# Patient Record
Sex: FEMALE | Race: BLACK OR AFRICAN AMERICAN | NOT HISPANIC OR LATINO | Employment: UNEMPLOYED | URBAN - METROPOLITAN AREA
[De-identification: names, ages, dates, MRNs, and addresses within clinical notes are randomized per-mention and may not be internally consistent; named-entity substitution may affect disease eponyms.]

---

## 2019-10-09 ENCOUNTER — HOSPITAL ENCOUNTER (EMERGENCY)
Facility: HOSPITAL | Age: 39
Discharge: HOME | End: 2019-10-09
Attending: EMERGENCY MEDICINE | Admitting: EMERGENCY MEDICINE
Payer: MEDICAID

## 2019-10-09 ENCOUNTER — APPOINTMENT (EMERGENCY)
Dept: RADIOLOGY | Facility: HOSPITAL | Age: 39
End: 2019-10-09
Payer: MEDICAID

## 2019-10-09 VITALS
RESPIRATION RATE: 16 BRPM | SYSTOLIC BLOOD PRESSURE: 136 MMHG | TEMPERATURE: 98.6 F | HEART RATE: 94 BPM | DIASTOLIC BLOOD PRESSURE: 76 MMHG | WEIGHT: 164.68 LBS | OXYGEN SATURATION: 96 %

## 2019-10-09 DIAGNOSIS — R07.89 CHEST WALL PAIN: Primary | ICD-10-CM

## 2019-10-09 LAB
ATRIAL RATE: 100 BPM
P AXIS: 77 DEGREES
PR INTERVAL: 126 MS
QRS AXIS: 58 DEGREES
QRSD INTERVAL: 72 MS
QT INTERVAL: 338 MS
QTC INTERVAL: 436 MS
T WAVE AXIS: 55 DEGREES
VENTRICULAR RATE: 100 BPM

## 2019-10-09 PROCEDURE — 93010 ELECTROCARDIOGRAM REPORT: CPT | Performed by: INTERNAL MEDICINE

## 2019-10-09 PROCEDURE — 71046 X-RAY EXAM CHEST 2 VIEWS: CPT

## 2019-10-09 PROCEDURE — 99284 EMERGENCY DEPT VISIT MOD MDM: CPT | Performed by: PHYSICIAN ASSISTANT

## 2019-10-09 PROCEDURE — 93005 ELECTROCARDIOGRAM TRACING: CPT

## 2019-10-09 PROCEDURE — 99285 EMERGENCY DEPT VISIT HI MDM: CPT

## 2019-10-09 RX ORDER — LIDOCAINE 50 MG/G
1 PATCH TOPICAL ONCE
Status: DISCONTINUED | OUTPATIENT
Start: 2019-10-09 | End: 2019-10-09 | Stop reason: HOSPADM

## 2019-10-09 RX ORDER — ACETAMINOPHEN 325 MG/1
650 TABLET ORAL ONCE
Status: COMPLETED | OUTPATIENT
Start: 2019-10-09 | End: 2019-10-09

## 2019-10-09 RX ORDER — ALBUTEROL SULFATE 90 UG/1
2 AEROSOL, METERED RESPIRATORY (INHALATION) EVERY 6 HOURS PRN
COMMUNITY

## 2019-10-09 RX ORDER — ACETAMINOPHEN 500 MG
500 TABLET ORAL EVERY 6 HOURS PRN
Qty: 30 TABLET | Refills: 0 | Status: SHIPPED | OUTPATIENT
Start: 2019-10-09 | End: 2020-03-16 | Stop reason: HOSPADM

## 2019-10-09 RX ORDER — MULTIVITAMIN
1 TABLET ORAL DAILY
COMMUNITY

## 2019-10-09 RX ADMIN — LIDOCAINE 1 PATCH: 50 PATCH TOPICAL at 10:05

## 2019-10-09 RX ADMIN — ACETAMINOPHEN 650 MG: 325 TABLET ORAL at 10:04

## 2019-10-09 NOTE — ED PROVIDER NOTES
History  Chief Complaint   Patient presents with    Chest Pain     Started 2 weeks ago  Reports pain along the left ribcage  Liang Adams is a 53-year-old female presenting with left-sided chest wall pain x2 weeks  Patient states pain worsens with movement and with palpation area  Denies substernal chest pain, cough, shortness of breath  Denies recent injury trauma to area  No nausea, vomiting, abdominal pain  No medications prior to arrival for these symptoms  No prior history of similar  No prior history of DVT or PE  Does not take birth control or estrogen supplementation  History provided by:  Patient   used: No    Chest Pain   Pain location:  L lateral chest  Pain quality: sharp    Pain radiates to:  Does not radiate  Pain radiates to the back: no    Onset quality:  Sudden  Duration:  2 weeks  Timing:  Intermittent  Progression:  Waxing and waning  Chronicity:  New  Context: movement and raising an arm    Relieved by:  None tried  Worsened by:  Certain positions and movement  Ineffective treatments:  None tried  Associated symptoms: no abdominal pain, no cough, no dizziness, no fever, no headache, no lower extremity edema, no nausea, no numbness, no palpitations, no shortness of breath, not vomiting and no weakness    Risk factors: no prior DVT/PE and no surgery        Prior to Admission Medications   Prescriptions Last Dose Informant Patient Reported? Taking? Multiple Vitamin (MULTIVITAMIN) tablet   Yes Yes   Sig: Take 1 tablet by mouth daily   albuterol (PROVENTIL HFA,VENTOLIN HFA) 90 mcg/act inhaler Past Week at Unknown time  Yes Yes   Sig: Inhale 2 puffs every 6 (six) hours as needed for wheezing      Facility-Administered Medications: None       Past Medical History:   Diagnosis Date    Asthma        History reviewed  No pertinent surgical history  History reviewed  No pertinent family history  I have reviewed and agree with the history as documented      Social History Tobacco Use    Smoking status: Never Smoker    Smokeless tobacco: Never Used   Substance Use Topics    Alcohol use: Not on file     Comment: Weekends    Drug use: Never        Review of Systems   Constitutional: Negative for chills and fever  HENT: Negative for congestion, ear discharge, ear pain, mouth sores, rhinorrhea, sinus pressure, sinus pain, sore throat and voice change  Eyes: Negative for pain, redness and visual disturbance  Respiratory: Negative for cough, chest tightness, shortness of breath and wheezing  Cardiovascular: Positive for chest pain  Negative for palpitations and leg swelling  Gastrointestinal: Negative for abdominal pain, constipation, diarrhea, nausea and vomiting  Genitourinary: Negative for dysuria, frequency and urgency  Musculoskeletal: Negative for myalgias, neck pain and neck stiffness  Skin: Negative for pallor, rash and wound  Neurological: Negative for dizziness, weakness, light-headedness, numbness and headaches  Physical Exam  Physical Exam   Constitutional: She is oriented to person, place, and time  She appears well-developed and well-nourished  Non-toxic appearance  No distress  HENT:   Head: Normocephalic and atraumatic  Right Ear: Tympanic membrane, external ear and ear canal normal    Left Ear: Tympanic membrane, external ear and ear canal normal    Nose: Nose normal    Mouth/Throat: Oropharynx is clear and moist  No oropharyngeal exudate  Eyes: Pupils are equal, round, and reactive to light  Conjunctivae and EOM are normal  Right eye exhibits no discharge  Left eye exhibits no discharge  Neck: Normal range of motion  Neck supple  Cardiovascular: Normal rate, regular rhythm and normal heart sounds  Exam reveals no gallop and no friction rub  No murmur heard  Pulmonary/Chest: Effort normal and breath sounds normal  No accessory muscle usage or stridor  No respiratory distress  She has no wheezes  She has no rhonchi   She has no rales  She exhibits tenderness  She exhibits no mass and no crepitus  Abdominal: Soft  Bowel sounds are normal  She exhibits no distension  There is no tenderness  There is no guarding  Lymphadenopathy:     She has no cervical adenopathy  Neurological: She is alert and oriented to person, place, and time  She exhibits normal muscle tone  Coordination normal    Skin: Skin is warm and dry  Capillary refill takes less than 2 seconds  No rash noted  She is not diaphoretic  No erythema  No pallor  Psychiatric: She has a normal mood and affect  Her behavior is normal  Judgment and thought content normal    Nursing note and vitals reviewed  Vital Signs  ED Triage Vitals [10/09/19 0912]   Temperature Pulse Respirations Blood Pressure SpO2   98 6 °F (37 °C) 94 16 136/76 96 %      Temp Source Heart Rate Source Patient Position - Orthostatic VS BP Location FiO2 (%)   Oral -- Lying Right arm --      Pain Score       9           Vitals:    10/09/19 0912   BP: 136/76   Pulse: 94   Patient Position - Orthostatic VS: Lying         Visual Acuity      ED Medications  Medications   acetaminophen (TYLENOL) tablet 650 mg (650 mg Oral Given 10/9/19 1004)       Diagnostic Studies  Results Reviewed     None                 XR chest 2 views   ED Interpretation by Ingris Jim PA-C (10/09 1005)   No acute cardipulmonary disease seen by me at this time  Final Result by Cher York MD (10/09 1151)      No acute cardiopulmonary disease  Possible 4 mm right upper lobe calcified granuloma        Workstation performed: [id number]                    Procedures  ECG 12 Lead Documentation Only  Date/Time: 10/9/2019 10:13 AM  Performed by: Ingris Jim PA-C  Authorized by: Ingris Jim PA-C     Indications / Diagnosis:  Chest wall pain  ECG reviewed by me, the ED Provider: yes    Patient location:  ED  Interpretation:     Interpretation: normal    Rate:     ECG rate:  100    ECG rate assessment comment:  Borderline normal/tachycardic  Rhythm:     Rhythm: sinus rhythm    Ectopy:     Ectopy: none    QRS:     QRS axis:  Normal  Conduction:     Conduction: normal    ST segments:     ST segments:  Normal  T waves:     T waves: normal             ED Course               PERC Rule for PE      Most Recent Value   PERC Rule for PE   Age >=50  0 Filed at: 10/09/2019 1005   HR >=100  0 Filed at: 10/09/2019 1005   O2 Sat on room air < 95%  0 Filed at: 10/09/2019 1005   History of PE or DVT  0 Filed at: 10/09/2019 1005   Recent trauma or surgery  0 Filed at: 10/09/2019 1005   Hemoptysis  0 Filed at: 10/09/2019 1005   Exogenous estrogen  0 Filed at: 10/09/2019 1005   Unilateral leg swelling  0 Filed at: 10/09/2019 1005   PERC Rule for PE Results  0 Filed at: 10/09/2019 1005                Wells' Criteria for PE      Most Recent Value   Wells' Criteria for PE   Clinical signs and symptoms of DVT  0 Filed at: 10/09/2019 1006   PE is primary diagnosis or equally likely  0 Filed at: 10/09/2019 1006   HR >100  0 Filed at: 10/09/2019 1006   Immobilization at least 3 days or Surgery in the previous 4 weeks  0 Filed at: 10/09/2019 1006   Previous, objectively diagnosed PE or DVT  0 Filed at: 10/09/2019 1006   Hemoptysis  0 Filed at: 10/09/2019 1006   Malignancy with treatment within 6 months or palliative  0 Filed at: 10/09/2019 1006   Wells' Criteria Total  0 Filed at: 10/09/2019 1006            MDM  Number of Diagnoses or Management Options  Chest wall pain:   Diagnosis management comments: Two weeks of left-sided chest wall pain which is reproducible with movement and with palpation  No coughing, shortness of breath, or radiation chest pain  No specific injury or trauma  Pain is reproducible on examination  Perc and Wells score of 0  EKG shows normal sinus rhythm  Likely msk chest wall pain  Will treat w/ supportive therapy  F/u with PCP is recommended   Pt verbalizes understanding of strict return indications including not limited to worsening pain, shortness breath, cough, or any new or worsening symptoms of concern  Amount and/or Complexity of Data Reviewed  Tests in the radiology section of CPT®: ordered and reviewed    Patient Progress  Patient progress: stable      Disposition  Final diagnoses:   Chest wall pain     Time reflects when diagnosis was documented in both MDM as applicable and the Disposition within this note     Time User Action Codes Description Comment    10/9/2019 11:02 AM Michelle Gerard Add [R07 [id number]] Chest wall pain       ED Disposition     ED Disposition Condition Date/Time Comment    Discharge Stable Wed Oct 9, 2019 11:01 AM Wanda Osorio discharge to home/self care  Follow-up Information     Follow up With Specialties Details Why Contact Info Additional [address] [facility] Schedule an appointment as soon as possible for a visit   [address] [place] [address] [address] [facility] [id number]  [address] [place], [place] [address] [address], [address]          Discharge Medication List as of 10/9/2019 11:03 AM      START taking these medications    Details   acetaminophen (TYLENOL) 500 mg tablet Take 1 tablet (500 mg total) by mouth every 6 (six) hours as needed for mild pain or moderate pain, Starting Wed 10/9/2019, Print      diclofenac sodium (VOLTAREN) 1 % Apply 2 g topically 4 (four) times a day, Starting Wed 10/9/2019, Print         CONTINUE these medications which have NOT CHANGED    Details   albuterol (PROVENTIL HFA,VENTOLIN HFA) 90 mcg/act inhaler Inhale 2 puffs every 6 (six) hours as needed for wheezing, Historical Med      Multiple Vitamin (MULTIVITAMIN) tablet Take 1 tablet by mouth daily, Historical Med           No discharge procedures on file      ED Provider  Electronically Signed by           Connie Flores PA-C  10/09/19 2331

## 2019-12-06 ENCOUNTER — APPOINTMENT (OUTPATIENT)
Dept: LAB | Facility: CLINIC | Age: 39
End: 2019-12-06

## 2019-12-06 ENCOUNTER — OFFICE VISIT (OUTPATIENT)
Dept: FAMILY MEDICINE CLINIC | Facility: CLINIC | Age: 39
End: 2019-12-06

## 2019-12-06 VITALS
OXYGEN SATURATION: 99 % | BODY MASS INDEX: 25.23 KG/M2 | SYSTOLIC BLOOD PRESSURE: 108 MMHG | RESPIRATION RATE: 16 BRPM | HEIGHT: 66 IN | HEART RATE: 79 BPM | WEIGHT: 157 LBS | DIASTOLIC BLOOD PRESSURE: 70 MMHG | TEMPERATURE: 98.7 F

## 2019-12-06 DIAGNOSIS — Z76.89 ENCOUNTER TO ESTABLISH CARE: ICD-10-CM

## 2019-12-06 DIAGNOSIS — R05.3 CHRONIC COUGH: ICD-10-CM

## 2019-12-06 DIAGNOSIS — Z11.4 ENCOUNTER FOR SCREENING FOR HIV: ICD-10-CM

## 2019-12-06 DIAGNOSIS — R05.9 COUGH: ICD-10-CM

## 2019-12-06 DIAGNOSIS — Z23 NEED FOR VACCINATION: ICD-10-CM

## 2019-12-06 DIAGNOSIS — J45.20 MILD INTERMITTENT ASTHMA WITHOUT COMPLICATION: Primary | ICD-10-CM

## 2019-12-06 LAB
ALBUMIN SERPL BCP-MCNC: 4.7 G/DL (ref 3.5–5)
ALP SERPL-CCNC: 90 U/L (ref 46–116)
ALT SERPL W P-5'-P-CCNC: 15 U/L (ref 12–78)
ANION GAP SERPL CALCULATED.3IONS-SCNC: 3 MMOL/L (ref 4–13)
AST SERPL W P-5'-P-CCNC: 8 U/L (ref 5–45)
BASOPHILS # BLD AUTO: 0.04 THOUSANDS/ΜL (ref 0–0.1)
BASOPHILS NFR BLD AUTO: 1 % (ref 0–1)
BILIRUB SERPL-MCNC: 0.4 MG/DL (ref 0.2–1)
BUN SERPL-MCNC: 12 MG/DL (ref 5–25)
CALCIUM SERPL-MCNC: 9.3 MG/DL (ref 8.3–10.1)
CHLORIDE SERPL-SCNC: 107 MMOL/L (ref 100–108)
CO2 SERPL-SCNC: 31 MMOL/L (ref 21–32)
CREAT SERPL-MCNC: 0.74 MG/DL (ref 0.6–1.3)
EOSINOPHIL # BLD AUTO: 0.2 THOUSAND/ΜL (ref 0–0.61)
EOSINOPHIL NFR BLD AUTO: 3 % (ref 0–6)
ERYTHROCYTE [DISTWIDTH] IN BLOOD BY AUTOMATED COUNT: 13.6 % (ref 11.6–15.1)
GFR SERPL CREATININE-BSD FRML MDRD: 121 ML/MIN/1.73SQ M
GLUCOSE P FAST SERPL-MCNC: 91 MG/DL (ref 65–99)
HCT VFR BLD AUTO: 42.4 % (ref 34.8–46.1)
HGB BLD-MCNC: 13.6 G/DL (ref 11.5–15.4)
IMM GRANULOCYTES # BLD AUTO: 0.01 THOUSAND/UL (ref 0–0.2)
IMM GRANULOCYTES NFR BLD AUTO: 0 % (ref 0–2)
LYMPHOCYTES # BLD AUTO: 2.84 THOUSANDS/ΜL (ref 0.6–4.47)
LYMPHOCYTES NFR BLD AUTO: 44 % (ref 14–44)
MCH RBC QN AUTO: 30.8 PG (ref 26.8–34.3)
MCHC RBC AUTO-ENTMCNC: 32.1 G/DL (ref 31.4–37.4)
MCV RBC AUTO: 96 FL (ref 82–98)
MONOCYTES # BLD AUTO: 0.27 THOUSAND/ΜL (ref 0.17–1.22)
MONOCYTES NFR BLD AUTO: 4 % (ref 4–12)
NEUTROPHILS # BLD AUTO: 3.13 THOUSANDS/ΜL (ref 1.85–7.62)
NEUTS SEG NFR BLD AUTO: 48 % (ref 43–75)
NRBC BLD AUTO-RTO: 0 /100 WBCS
PLATELET # BLD AUTO: 325 THOUSANDS/UL (ref 149–390)
PMV BLD AUTO: 11.3 FL (ref 8.9–12.7)
POTASSIUM SERPL-SCNC: 3.8 MMOL/L (ref 3.5–5.3)
PROT SERPL-MCNC: 8.3 G/DL (ref 6.4–8.2)
RBC # BLD AUTO: 4.41 MILLION/UL (ref 3.81–5.12)
SODIUM SERPL-SCNC: 141 MMOL/L (ref 136–145)
WBC # BLD AUTO: 6.49 THOUSAND/UL (ref 4.31–10.16)

## 2019-12-06 PROCEDURE — 80053 COMPREHEN METABOLIC PANEL: CPT

## 2019-12-06 PROCEDURE — 36415 COLL VENOUS BLD VENIPUNCTURE: CPT

## 2019-12-06 PROCEDURE — 99203 OFFICE O/P NEW LOW 30 MIN: CPT | Performed by: FAMILY MEDICINE

## 2019-12-06 PROCEDURE — 87389 HIV-1 AG W/HIV-1&-2 AB AG IA: CPT

## 2019-12-06 PROCEDURE — 85025 COMPLETE CBC W/AUTO DIFF WBC: CPT

## 2019-12-06 PROCEDURE — 86480 TB TEST CELL IMMUN MEASURE: CPT

## 2019-12-06 PROCEDURE — 82164 ANGIOTENSIN I ENZYME TEST: CPT

## 2019-12-06 RX ORDER — BENZONATATE 100 MG/1
100 CAPSULE ORAL 3 TIMES DAILY PRN
Qty: 30 CAPSULE | Refills: 1 | Status: SHIPPED | OUTPATIENT
Start: 2019-12-06 | End: 2021-01-26 | Stop reason: ALTCHOICE

## 2019-12-06 NOTE — PROGRESS NOTES
Assessment/Plan:    Encounter to establish care  - HIV screening  - Patient declined Flu and Tdap at this visit  - PHQ score of 0  - PAP smear x 2017 per patient normal  Unclear if with HPV co testing, no records  Would require annual Gyn exam and encouraged patient to schedule for annual Gyn     Mild intermittent asthma without complication  - Albuterol use < = 2 times/ week  - Does have nocturnal cough   - Clarified indication for albuterol use  Advised to monitor symptoms and albuterol usage and let me know on f/u to assess need for step up/ continuing the same  - Check PFT    Chronic cough  - PMH of asthma  At current self reported ventolin use appears to be mild intermittent   - Check PFT   - No post nasal drip/ reflux/ known allergies   - H/O ?pneumothorax with chest tube placement in 1/2019 at [facility]  No records available  To try and obtain records  - 4 mm R UL calcified granuloma noted on CXR 11/2019   -  R/o TB, sarcoidosis  Check Quantiferon gold, CBC, ACE  F/u with lab work in 2 weeks   - Tessalon perles TID prn        Diagnoses and all orders for this visit:    Mild intermittent asthma without complication  -     Complete PFT with post bronchodilator; Future    Chronic cough  -     Quantiferon TB Gold Plus; Future  -     Angiotensin converting enzyme; Future  -     CBC and differential; Future  -     Comprehensive metabolic panel; Future  -     benzonatate (TESSALON PERLES) 100 mg capsule; Take 1 capsule (100 mg total) by mouth 3 (three) times a day as needed for cough    Encounter for screening for HIV  -     HIV 1/2 AG-AB combo; Future    Encounter to establish care    Need for vaccination  -     TDAP VACCINE GREATER THAN OR EQUAL TO 8YO IM  -     influenza vaccine, 1185-1222, quadrivalent, recombinant, PF, 0 5 mL, for patients 18 yr+ (FLUBLOK)    Other orders  -     Cancel: Complete PFT with post bronchodilator; Future  -     Cancel: Quantiferon TB Gold Plus;  Future          Subjective: Patient ID: Anna Mims is a 41 y o  female  41 y o female moved from [address] [place] [address] to [facility] 3 months back and presents to establish care  Moved from [place] to [facility] in   Does not remember name of PCP in [place], [place]  PMH notable for asthma  Possibly mild intermittent, states she has only remained on ventolin prn  Current usage twice /week  Was in [facility] in 2019 for ? Pneumothorax possibly  States she had an asthma attack and went to the ER where she was given breathing treatments and discharged  However she had recurrent SOB and Sx in about 2 hours and was taken back and told she would have to be sedated and have chest tubes placed  Hospitalized for 2 weeks  No records available with patient today  Unsure of final diagnosis  States she never followed up with pulmonology  Post discharge she remained in f/u with her PCP but no further w/u was done per patient  She has had cough and chest pain L sided since then  Tried robitussin/ mucinex but cough persisted  Nocturnal  Cough + Productive of whitish sputum  No hemoptysis  No H/O TB in the past  Night sweats, chills on and off  Has voluntarily lost some weight as well  Denies any smoking  Some passive smoke exposure as her  smokes  But he smokes outside  Also C/O dyspnea on exertion  No substernal pain/ shoulder pain/ arm pain/ jaw pain/ neck pain/ palpitation/ leg swelling  No PND/ orthopnea  PMH -  Asthma     PSH - None    Ob Gyn -     Menarche - 17, regular, monthly cycles   [id number], LCB   Not on any birth control at this point  PAP smear x  Normal     Family Hx - Father -DM  Mom - HTN/ asthtma    NKDA    Social Hx -   No smoking/ alcohol  MJ use x 2 - 3 weeks ago  NO IVDU      The following portions of the patient's history were reviewed and updated as appropriate: She  has a past medical history of Asthma    Patient Active Problem List    Diagnosis Date Noted    Encounter to establish care 2019  Mild intermittent asthma without complication 07/19/7703    Chronic cough 12/06/2019     She  has no past surgical history on file  Her family history is not on file  She  reports that she has never smoked  She has never used smokeless tobacco  She reports that she does not use drugs  Her alcohol history is not on file  Current Outpatient Medications   Medication Sig Dispense Refill    acetaminophen (TYLENOL) 500 mg tablet Take 1 tablet (500 mg total) by mouth every 6 (six) hours as needed for mild pain or moderate pain 30 tablet 0    albuterol (PROVENTIL HFA,VENTOLIN HFA) 90 mcg/act inhaler Inhale 2 puffs every 6 (six) hours as needed for wheezing      diclofenac sodium (VOLTAREN) 1 % Apply 2 g topically 4 (four) times a day 100 g 0    Multiple Vitamin (MULTIVITAMIN) tablet Take 1 tablet by mouth daily      benzonatate (TESSALON PERLES) 100 mg capsule Take 1 capsule (100 mg total) by mouth 3 (three) times a day as needed for cough 30 capsule 1     No current facility-administered medications for this visit  Current Outpatient Medications on File Prior to Visit   Medication Sig    acetaminophen (TYLENOL) 500 mg tablet Take 1 tablet (500 mg total) by mouth every 6 (six) hours as needed for mild pain or moderate pain    albuterol (PROVENTIL HFA,VENTOLIN HFA) 90 mcg/act inhaler Inhale 2 puffs every 6 (six) hours as needed for wheezing    diclofenac sodium (VOLTAREN) 1 % Apply 2 g topically 4 (four) times a day    Multiple Vitamin (MULTIVITAMIN) tablet Take 1 tablet by mouth daily     No current facility-administered medications on file prior to visit  She has No Known Allergies       Review of Systems   Constitutional: Negative for chills and fever  HENT: Negative for congestion, postnasal drip, rhinorrhea, sinus pain, sore throat and trouble swallowing  Respiratory: Positive for cough and shortness of breath  Negative for wheezing      Cardiovascular: Negative for chest pain, palpitations and leg swelling  Gastrointestinal: Negative for abdominal pain, blood in stool, constipation, diarrhea, nausea and vomiting  Genitourinary: Negative for dysuria and hematuria  Musculoskeletal: Negative for back pain and gait problem  Skin: Negative for rash  Neurological: Negative for seizures, weakness and headaches  Hematological: Negative for adenopathy  Psychiatric/Behavioral: The patient is not nervous/anxious  Objective:      /70 (BP Location: Left arm, Patient Position: Sitting, Cuff Size: Standard)   Pulse 79   Temp 98 7 °F (37 1 °C)   Resp 16   Ht 5' 5 5" (1 664 m)   Wt 71 2 kg (157 lb)   LMP 11/06/2019 (Exact Date)   SpO2 99%   BMI 25 73 kg/m²          Physical Exam   Constitutional: She is oriented to person, place, and time  She appears well-developed and well-nourished  No distress  HENT:   Head: Normocephalic and atraumatic  Right Ear: External ear normal    Left Ear: External ear normal    Nose: Nose normal    Mouth/Throat: Oropharynx is clear and moist  No oropharyngeal exudate  Eyes: Pupils are equal, round, and reactive to light  Conjunctivae and EOM are normal  Right eye exhibits no discharge  Left eye exhibits no discharge  Neck: Normal range of motion  Neck supple  No tracheal deviation present  Cardiovascular: Normal rate, regular rhythm and normal heart sounds  Exam reveals no gallop and no friction rub  No murmur heard  Pulmonary/Chest: Effort normal  No stridor  No respiratory distress  She has wheezes (B/L expiratory, occasional )  She has no rales  Abdominal: Soft  She exhibits no distension  There is no tenderness  There is no rebound and no guarding  Musculoskeletal: Normal range of motion  She exhibits no edema or tenderness  Lymphadenopathy:     She has no cervical adenopathy  Neurological: She is alert and oriented to person, place, and time  She has normal strength  She exhibits normal muscle tone   Gait normal    Skin: Skin is warm  No rash noted  She is not diaphoretic  Psychiatric: She has a normal mood and affect  Vitals reviewed

## 2019-12-06 NOTE — ASSESSMENT & PLAN NOTE
- HIV screening  - Patient declined Flu and Tdap at this visit  - PHQ score of 0  - PAP smear x 2017 per patient normal  Unclear if with HPV co testing, no records   Would require annual Gyn exam and encouraged patient to schedule for annual Gyn

## 2019-12-06 NOTE — ASSESSMENT & PLAN NOTE
- Albuterol use < = 2 times/ week  - Does have nocturnal cough   - Clarified indication for albuterol use   Advised to monitor symptoms and albuterol usage and let me know on f/u to assess need for step up/ continuing the same  - Check PFT

## 2019-12-06 NOTE — ASSESSMENT & PLAN NOTE
- PMH of asthma  At current self reported ventolin use appears to be mild intermittent   - Check PFT   - No post nasal drip/ reflux/ known allergies   - H/O ?pneumothorax with chest tube placement in 1/2019 at [facility]  No records available  To try and obtain records  - 4 mm R UL calcified granuloma noted on CXR 11/2019   -  R/o TB, sarcoidosis  Check Quantiferon gold, CBC, ACE   F/u with lab work in 2 weeks   - Tessalon perles TID prn

## 2019-12-09 LAB
ACE SERPL-CCNC: 25 U/L (ref 14–82)
GAMMA INTERFERON BACKGROUND BLD IA-ACNC: 0.1 IU/ML
HIV 1+2 AB+HIV1 P24 AG SERPL QL IA: NORMAL
M TB IFN-G BLD-IMP: NEGATIVE
M TB IFN-G CD4+ BCKGRND COR BLD-ACNC: -0.01 IU/ML
M TB IFN-G CD4+ BCKGRND COR BLD-ACNC: 0.01 IU/ML
MITOGEN IGNF BCKGRD COR BLD-ACNC: >10 IU/ML

## 2020-02-27 ENCOUNTER — HOSPITAL ENCOUNTER (INPATIENT)
Facility: HOSPITAL | Age: 40
LOS: 18 days | Discharge: HOME | DRG: 885 | End: 2020-03-16
Attending: PSYCHIATRY & NEUROLOGY | Admitting: PSYCHIATRY & NEUROLOGY
Payer: COMMERCIAL

## 2020-02-27 ENCOUNTER — HOSPITAL ENCOUNTER (EMERGENCY)
Facility: HOSPITAL | Age: 40
End: 2020-02-27
Attending: EMERGENCY MEDICINE | Admitting: EMERGENCY MEDICINE
Payer: MEDICAID

## 2020-02-27 VITALS
DIASTOLIC BLOOD PRESSURE: 100 MMHG | OXYGEN SATURATION: 99 % | HEART RATE: 68 BPM | RESPIRATION RATE: 16 BRPM | SYSTOLIC BLOOD PRESSURE: 157 MMHG | TEMPERATURE: 98.1 F | WEIGHT: 139.33 LBS | BODY MASS INDEX: 22.83 KG/M2

## 2020-02-27 DIAGNOSIS — F31.2 BIPOLAR I DISORDER, MOST RECENT EPISODE MANIC, SEVERE WITH PSYCHOTIC FEATURES (HCC): Primary | Chronic | ICD-10-CM

## 2020-02-27 DIAGNOSIS — Z00.8 ENCOUNTER FOR PSYCHOLOGICAL EVALUATION: Primary | ICD-10-CM

## 2020-02-27 DIAGNOSIS — Z00.8 ENCOUNTER FOR PSYCHOLOGICAL EVALUATION: ICD-10-CM

## 2020-02-27 PROBLEM — Z76.89 ENCOUNTER TO ESTABLISH CARE: Status: RESOLVED | Noted: 2019-12-06 | Resolved: 2020-02-27

## 2020-02-27 LAB — ETHANOL EXG-MCNC: 0 MG/DL

## 2020-02-27 PROCEDURE — 99282 EMERGENCY DEPT VISIT SF MDM: CPT | Performed by: EMERGENCY MEDICINE

## 2020-02-27 PROCEDURE — 99285 EMERGENCY DEPT VISIT HI MDM: CPT

## 2020-02-27 PROCEDURE — 82075 ASSAY OF BREATH ETHANOL: CPT | Performed by: EMERGENCY MEDICINE

## 2020-02-27 RX ORDER — OLANZAPINE 5 MG/1
5 TABLET ORAL
Status: DISCONTINUED | OUTPATIENT
Start: 2020-02-27 | End: 2020-03-16 | Stop reason: HOSPADM

## 2020-02-27 RX ORDER — HALOPERIDOL 5 MG/ML
5 INJECTION INTRAMUSCULAR EVERY 6 HOURS PRN
Status: CANCELLED | OUTPATIENT
Start: 2020-02-27

## 2020-02-27 RX ORDER — TRAZODONE HYDROCHLORIDE 50 MG/1
50 TABLET ORAL
Status: DISCONTINUED | OUTPATIENT
Start: 2020-02-27 | End: 2020-03-16 | Stop reason: HOSPADM

## 2020-02-27 RX ORDER — RISPERIDONE 0.5 MG/1
0.5 TABLET, ORALLY DISINTEGRATING ORAL EVERY 4 HOURS PRN
Status: DISCONTINUED | OUTPATIENT
Start: 2020-02-27 | End: 2020-02-28

## 2020-02-27 RX ORDER — HYDROXYZINE HYDROCHLORIDE 25 MG/1
25 TABLET, FILM COATED ORAL EVERY 4 HOURS PRN
Status: DISCONTINUED | OUTPATIENT
Start: 2020-02-27 | End: 2020-03-16 | Stop reason: HOSPADM

## 2020-02-27 RX ORDER — HALOPERIDOL 5 MG
5 TABLET ORAL EVERY 6 HOURS PRN
Status: DISCONTINUED | OUTPATIENT
Start: 2020-02-27 | End: 2020-02-28

## 2020-02-27 RX ORDER — TRAZODONE HYDROCHLORIDE 50 MG/1
50 TABLET ORAL
Status: CANCELLED | OUTPATIENT
Start: 2020-02-27

## 2020-02-27 RX ORDER — HALOPERIDOL 5 MG
5 TABLET ORAL EVERY 6 HOURS PRN
Status: CANCELLED | OUTPATIENT
Start: 2020-02-27

## 2020-02-27 RX ORDER — OLANZAPINE 5 MG/1
5 TABLET ORAL
Status: CANCELLED | OUTPATIENT
Start: 2020-02-27

## 2020-02-27 RX ORDER — HYDROXYZINE HYDROCHLORIDE 25 MG/1
25 TABLET, FILM COATED ORAL EVERY 4 HOURS PRN
Status: CANCELLED | OUTPATIENT
Start: 2020-02-27

## 2020-02-27 RX ORDER — RISPERIDONE 0.5 MG/1
0.5 TABLET, ORALLY DISINTEGRATING ORAL EVERY 4 HOURS PRN
Status: CANCELLED | OUTPATIENT
Start: 2020-02-27

## 2020-02-27 RX ORDER — OLANZAPINE 10 MG/1
5 INJECTION, POWDER, LYOPHILIZED, FOR SOLUTION INTRAMUSCULAR
Status: CANCELLED | OUTPATIENT
Start: 2020-02-27

## 2020-02-27 RX ORDER — HALOPERIDOL 5 MG/ML
5 INJECTION INTRAMUSCULAR EVERY 6 HOURS PRN
Status: DISCONTINUED | OUTPATIENT
Start: 2020-02-27 | End: 2020-02-28

## 2020-02-27 RX ORDER — OLANZAPINE 10 MG/1
5 INJECTION, POWDER, LYOPHILIZED, FOR SOLUTION INTRAMUSCULAR
Status: DISCONTINUED | OUTPATIENT
Start: 2020-02-27 | End: 2020-02-28

## 2020-02-27 NOTE — ED NOTES
Meal tray ordered for patient despite patient refusing to look at menu       Alverto Botello, DOMINGO  02/27/20 6328

## 2020-02-27 NOTE — ED NOTES
Offered patient menu to order lunch, patient refused   Patient states "I make more money than you, I don't need your food, I plead the fifth "     Jonathan Caro, DOMINGO  02/27/20 2957

## 2020-02-27 NOTE — LETTER
Section I - General Information    Name of Patient: Michela Michael                 : 1980    Medicare #:____________________  Transport Date: 20 (PCS is valid for round trips on this date and for all repetitive trips in the 60-day range as noted below )  Origin: [address]                                                         [facility]ination:[facility] ________________________________________________  Is the pt's stay covered under Medicare Part A (PPS/DRG)     (_) YES  (X) NO  Closest appropriate facility? (X) YES  (_) NO  If no, why is transport to more distant facility required?________________________  If hosp-hosp transfer, describe services needed at 2nd facility not available at 1st facility? _________________________________  If hospice pt, is this transport related to pt's terminal illness? (_) YES (X) NO Describe____________________________________    Section II - Medical Necessity Questionnaire  Ambulance transportation is medically necessary only if other means of transport are contraindicated or would be potentially harmful to the patient  To meet this requirement, the patient must either be "bed confined" or suffer from a condition such that transport by means other than ambulance is contraindicated by the patient's condition   The following questions must be answered by the medical professional signing below for this form to be valid:    1)  Describe the MEDICAL CONDITION (physical and/or mental) of this patient AT [address] [place] [address] that requires the patient to be transported in an ambulance and why transport by other means is contraindicated by the patient's condition:__________________________________________________________________________________________________    2) Is the patient "bed confined" as defined below?     (_) YES  (X) NO  To be "be confined" the patient must satisfy all three of the following conditions: (1) unable to get up from bed without Assistance; AND (2) unable to ambulate; AND (3) unable to sit in a chair or wheelchair  3) Can this patient safely be transported by car or wheelchair Vernadine Shells (i e , seated during transport without a medical attendant or monitoring)?   (_) YES  (X) NO    4) In addition to completing questions 1-3 above, please check any of the following conditions that apply*:  *Note: supporting documentation for any boxes checked must be maintained in the patient's medical records  (_)Contractures   (_)Non-Healed Fractures  (_)Patient is confused (_)Patient is comatose (_)Moderate/severe pain on movement (X)Danger to self/others  (_)IV meds/fluids required (_)Patient is combative(_)Need or possible need for restraints (_)DVT requires elevation of lower extremity  (_)Medical attendant required (_)Requires oxygen-unable to self administer (_)Special handling/isolation/infection control precautions required (_)Unable to tolerate seated position for time needed to transport (_)Hemodynamic monitoring required en route (_)Unable to sit in a chair or wheelchair due to decubitus ulcers or other wounds (_)Cardiac monitoring required en route (_)Morbid obesity requires additional personnel/equipment to safely handle patient (_)Orthopedic device (backboard, halo, pins, traction, brace, wedge, etc,) requiring special handling during transport (_)Other(specify)_______________________________________________    Section III - Signature of Physician or Healthcare Professional  I certify that the above information is true and correct based on my evaluation of this patient, and represent that the patient requires transport by ambulance and that other forms of transport are contraindicated   I understand that this information will be used by the Centers for Medicare and Medicaid Services (CMS) to support the determination of medical necessity for ambulance services, and I represent that I have personal knowledge of the patient's condition at time of transport  (_) If this box is checked, I also certify that the patient is physically or mentally incapable of signing the ambulance service's claim and that the institution with which I am affiliated has furnished care, services, or assistance to the patient  My signature below is made on behalf of the patient pursuant to 42 CFR §424 36(b)(4)  In accordance with 42 CFR §424 37, the specific reason(s) that the patient is physically or mentally incapable of signing the claim form is as follows: _________________________________________________________________________________________________________      Signature of Physician* or Healthcare Professional______________________________________________________________  Signature Date 02/27/20 (For scheduled repetitive transports, this form is not valid for transports performed more than 60 days after this date)    Printed Name & Credentials of Physician or Healthcare Professional (MD, DO, RN, etc )________________________________  *Form must be signed by patient's attending physician for scheduled, repetitive transports   For non-repetitive, unscheduled ambulance transports, if unable to obtain the signature of the attending physician, any of the following may sign (choose appropriate option below)  (_) Physician Assistant (_)  Clinical Nurse Specialist (_)  Registered Nurse  (_)  Nurse Practitioner  (X) Discharge Planner

## 2020-02-27 NOTE — ED NOTES
Patient reports she has no idea why she is here and she was preparing for a party  She said she just became a millionaire and is obsessed that she did not do her nails today  She apologizes for coming to the department without her nails done and make up done  Patient denies suicidal and homicidal ideations however has a flight of ideas,  Per childrens father, patient has been cursing at home and cursing at her kids and calling her 9year old a lesbian  He reports she has lost a lot of weight and has been very confused telling her family she is at the v but shes home and also told her sister people are coming after them with guns and she is not her sister  She told the sancho father to take her head off because she dont know where she is   He has agreed to sign her in since patient is not aware of herself and is unable to make the decision for voluntary treatment

## 2020-02-27 NOTE — ED PROVIDER NOTES
Pt Name: Koffi Guatemalan  MRN: [id number]  [place] 1980  Age/Sex: 42 y o  female  Date of evaluation: 2/27/2020  PCP: Vinny Snowden MD    [address] [place] [address]    Chief Complaint   Patient presents with   [facility]     pt brought in by [employer] after recieving call from pt family; per [employer] pt has stopped taking her medications for weeks and pt has been rambiling with her speech and not making any sense; pt denies SI/HI/AH/VH  pt is calm and cooperative upon arrival          HPI    Enrico Silverman presents to the Emergency Department after her family called the police  She has known bipolar disorder  She had not been taking her meds (monthly injection) since December  She has had weight loss, insomnia, paranoia, and is not eating  Her family is very concerned  Her  called her sister and mother for help because she is not able to manage their children and he is fearful for her safety  She is unwilling to sign herself in and currently lacks insight into the situation but her  wishes to 36 her so that she can get the care that she needs  HPI      Past Medical and Surgical History    Past Medical History:   Diagnosis Date    Asthma     Bipolar disorder ([facility] [address] )        Past Surgical History:   Procedure Laterality Date    NO PAST SURGERIES         Family History   Family history unknown: Yes       Social History     Tobacco Use    Smoking status: Former Smoker     Packs/day: 1 50     Types: Cigarettes    Smokeless tobacco: Never Used   Substance Use Topics    Alcohol use: Yes     Frequency: 2-3 times a week     Drinks per session: 1 or 2     Binge frequency: Never     Comment: Weekends    Drug use: Never           Allergies    No Known Allergies    Home Medications    Prior to Admission medications    Medication Sig Start Date End Date Taking?  Authorizing Provider   acetaminophen (TYLENOL) 500 mg tablet Take 1 tablet (500 mg total) by mouth every 6 (six) hours as needed for mild pain or moderate pain 10/9/19   Maria M Phelps PA-C   albuterol (PROVENTIL HFA,VENTOLIN HFA) 90 mcg/act inhaler Inhale 2 puffs every 6 (six) hours as needed for wheezing    Historical Provider, MD   benzonatate (TESSALON PERLES) 100 mg capsule Take 1 capsule (100 mg total) by mouth 3 (three) times a day as needed for cough 12/6/19   Cheryl Collins MD   diclofenac sodium (VOLTAREN) 1 % Apply 2 g topically 4 (four) times a day 10/9/19   Maria M Phelps PA-C   Multiple Vitamin (MULTIVITAMIN) tablet Take 1 tablet by mouth daily    Historical Provider, MD           Review of Systems    Review of Systems   Unable to perform ROS: Psychiatric disorder   Endocrine: Negative for polydipsia, polyphagia and polyuria  Allergic/Immunologic: Negative for immunocompromised state  Psychiatric/Behavioral: Positive for agitation, behavioral problems, confusion, decreased concentration, dysphoric mood and sleep disturbance  Negative for suicidal ideas  The patient is nervous/anxious and is hyperactive  All other systems reviewed and negative  Physical Exam      ED Triage Vitals [02/27/20 0904]   Temperature Pulse Respirations Blood Pressure SpO2   98 1 °F (36 7 °C) 98 18 158/91 98 %      Temp Source Heart Rate Source Patient Position - Orthostatic VS BP Location FiO2 (%)   Oral Monitor Sitting Left arm --      Pain Score       No Pain               Physical Exam   Constitutional: She is oriented to person, place, and time  She appears well-developed and well-nourished  No distress  HENT:   Head: Normocephalic and atraumatic  Nose: Nose normal    Mouth/Throat: Oropharynx is clear and moist    Eyes: Pupils are equal, round, and reactive to light  Conjunctivae, EOM and lids are normal    Neck: Normal range of motion  Neck supple  Cardiovascular: Normal rate, regular rhythm and normal heart sounds  Exam reveals no gallop and no friction rub  No murmur heard    Pulmonary/Chest: Effort normal and breath sounds normal  No accessory muscle usage  No respiratory distress  She has no wheezes  She has no rales  Abdominal: Soft  She exhibits no distension  There is no tenderness  There is no rebound and no guarding  Neurological: She is alert and oriented to person, place, and time  No cranial nerve deficit or sensory deficit  Skin: Skin is warm and dry  No rash noted  She is not diaphoretic  No erythema  Psychiatric: Her behavior is normal  Her mood appears anxious  Her affect is labile and inappropriate  Her speech is rapid and/or pressured and tangential  Thought content is delusional  Cognition and memory are impaired  She expresses impulsivity  Nursing note and vitals reviewed  Assessment and Plan    Bill Reddy is a 42 y o  female who presents with acute mental illness related issues  Plan will be to perform diagnostic testing for medical clearance as needed and treat symptomatically  MDM    Diagnostic Results      Labs:    Results for orders placed or performed during the hospital encounter of 02/27/20   POCT alcohol breath test   Result Value Ref Range    EXTBreath Alcohol 0 00        All labs reviewed and utilized in the medical decision making process    Radiology:    No orders to display       All radiology studies independently viewed by me and interpreted by the radiologist     Procedure    Procedures      ED Course of Care and Re-Assessments    Crisis is aware    I spoke with  via phone while with patient's sister, mother and friend  Medications - No data to display    302 petitioned/ signed/ upheld/ enacted  Await placement at this point      FINAL IMPRESSION    Final diagnoses:   Encounter for psychological evaluation         DISPOSITION/PLAN    Time reflects when diagnosis was documented in both MDM as applicable and the Disposition within this note     Time User Action Codes Description Comment    2/27/2020  1:20 PM Michael ROWLAND Add [Z00 8] Encounter for psychological evaluation       ED Disposition     ED Disposition Condition Date/Time Comment    Transfer to [facility] Feb 27, 2020  4:08 PM Thomas Bruce should be transferred out to [place] and has been medically cleared  MD Documentation      Most Recent Value   Patient Condition  The patient has been stabilized such that within reasonable medical probability, no material deterioration of the patient condition or the condition of the unborn child(heather) is likely to result from the transfer   Reason for Transfer  Level of Care needed not available at this facility [inpatient psych]   Benefits of Transfer  Specialized equipment and/or services available at the receiving facility (Include comment)________________________   Risks of Transfer  Potential for delay in receiving treatment, Potential deterioration of medical condition, Possible worsening of condition or death during transfer   Accepting Physician    [facility] [address]   [address] [facility]  [place]     (Name & Tel number)  Shirley Awad (Ori Sy) [phone]   Transported by (Company and Unit #)  [facility]    Sending MD DR Darwin nAthony    Provider Certification  General risk, such as traffic hazards, adverse weather conditions, rough terrain or turbulence, possible failure of equipment (including vehicle or aircraft), or consequences of actions of persons outside the control of the transport personnel, The patient is stable for psychiatric transfer because they are medically stable, and is protected from harming him/herself or others during transport, Unanticipated needs of medical equipment and personnel during transport, Risk of worsening condition, The possibility of a transport vehicle being unavailable      RN Documentation      [address] Name, [address]   [address] [facility]  [place]     (Name & Tel number) BRENNON (CRISIS WORKER) [phone]   Medications Reviewed with Next Provider of Service  Yes   Transport Mode  Ambulance [[facility] ]   Transported by (Company and Unit #)  [facility]    Level of Care  Basic life support [[facility] ]   Copies of Medical Records Sent  Transfer form   Patient Belongings Disposition  Sent with patient      Follow-up Information    None           PATIENT REFERRED TO:    No follow-up provider specified  DISCHARGE MEDICATIONS:    Discharge Medication List as of 2/27/2020 10:04 PM      CONTINUE these medications which have NOT CHANGED    Details   albuterol (PROVENTIL HFA,VENTOLIN HFA) 90 mcg/act inhaler Inhale 2 puffs every 6 (six) hours as needed for wheezing, Historical Med      Multiple Vitamin (MULTIVITAMIN) tablet Take 1 tablet by mouth daily, Historical Med      acetaminophen (TYLENOL) 500 mg tablet Take 1 tablet (500 mg total) by mouth every 6 (six) hours as needed for mild pain or moderate pain, Starting Wed 10/9/2019, Print      benzonatate (TESSALON PERLES) 100 mg capsule Take 1 capsule (100 mg total) by mouth 3 (three) times a day as needed for cough, Starting Fri 12/6/2019, Normal      diclofenac sodium (VOLTAREN) 1 % Apply 2 g topically 4 (four) times a day, Starting Wed 10/9/2019, Print             No discharge procedures on file           Denise Germain, DO Denise Germain,   02/28/20 3246

## 2020-02-27 NOTE — EMTALA/ACUTE CARE TRANSFER
[facility]  [address] [facility] [address]  Dept: [phone]      EMTALA TRANSFER CONSENT    NAME Koffi Jerry                                         1980                              MRN [id number]    I have been informed of my rights regarding examination, treatment, and transfer   by Dr Monique Mayer: Specialized equipment and/or services available at the receiving facility (Include comment)________________________    Risks: Potential for delay in receiving treatment, Potential deterioration of medical condition, Possible worsening of condition or death during transfer      Consent for Transfer:  I acknowledge that my medical condition has been evaluated and explained to me by the emergency department physician or other qualified medical person and/or my attending physician, who has recommended that I be transferred to the service of  Accepting Physician: Gabriella Frankel  at [facility] Name, [address] : [address] [place], [place] [address]JENNIFER MEZA   The above potential benefits of such transfer, the potential risks associated with such transfer, and the probable risks of not being transferred have been explained to me, and I fully understand them  The doctor has explained that, in my case, the benefits of transfer outweigh the risks  I agree to be transferred  I authorize the performance of emergency medical procedures and treatments upon me in both transit and upon arrival at the receiving facility  Additionally, I authorize the release of any and all medical records to the receiving facility and request they be transported with me, if possible  I understand that the safest mode of transportation during a medical emergency is an ambulance and that the Hospital advocates the use of this mode of transport   Risks of traveling to the receiving facility by car, including absence of medical control, life sustaining equipment, such as oxygen, and medical personnel has been explained to me and I fully understand them  (EVAN CORRECT BOX BELOW)  [  ]  I consent to the stated transfer and to be transported by ambulance/helicopter  [  ]  I consent to the stated transfer, but refuse transportation by ambulance and accept full responsibility for my transportation by car  I understand the risks of non-ambulance transfers and I exonerate the Hospital and its staff from any deterioration in my condition that results from this refusal     X___________________________________________    DATE  20  TIME________  Signature of patient or legally responsible individual signing on patient behalf           RELATIONSHIP TO PATIENT_________________________          Provider Certification    NAME Anna Mims                                         1980                              MRN [id number]    A medical screening exam was performed on the above named patient  Based on the examination:    Condition Necessitating Transfer The encounter diagnosis was Encounter for psychological evaluation  Patient Condition: The patient has been stabilized such that within reasonable medical probability, no material deterioration of the patient condition or the condition of the unborn child(heather) is likely to result from the transfer    Reason for Transfer: Level of Care needed not available at this facility(inpatient psych)    Transfer Requirements: Facility [address] [place] [address]    · Space available and qualified personnel available for treatment as acknowledged by Pema He ([address] [place] [address]) [phone]  · Agreed to accept transfer and to provide appropriate medical treatment as acknowledged by       DR Papa Mccarty   · Appropriate medical records of the examination and treatment of the patient are provided at the time of transfer   [facility], [address] _______  · Transfer will be performed by qualified personnel from [facility]   and appropriate transfer equipment as required, including the use of necessary and appropriate life support measures  Provider Certification: I have examined the patient and explained the following risks and benefits of being transferred/refusing transfer to the patient/family:  General risk, such as traffic hazards, adverse weather conditions, rough terrain or turbulence, possible failure of equipment (including vehicle or aircraft), or consequences of actions of persons outside the control of the transport personnel, The patient is stable for psychiatric transfer because they are medically stable, and is protected from harming him/herself or others during transport, Unanticipated needs of medical equipment and personnel during transport, Risk of worsening condition, The possibility of a transport vehicle being unavailable      Based on these reasonable risks and benefits to the patient and/or the unborn child(heather), and based upon the information available at the time of the patients examination, I certify that the medical benefits reasonably to be expected from the provision of appropriate medical treatments at another medical facility outweigh the increasing risks, if any, to the individuals medical condition, and in the case of labor to the unborn child, from effecting the transfer      X____________________________________________ DATE 02/27/20        TIME_______      ORIGINAL - SEND TO MEDICAL RECORDS   COPY - SEND WITH PATIENT DURING TRANSFER

## 2020-02-27 NOTE — ED NOTES
RN informed pt  About the importance of urine sample and that we need it as soon as possible        Deo , RN  02/27/20 6614

## 2020-02-27 NOTE — ED NOTES
Patient is accepted at [facility]  Patient is accepted by Dr Katharine Levin per Lauri Maradiaga (intake)     Transportation is arranged with [facility]   Transportation is scheduled for 02/27/2020 @2200  Patient may go to the floor at 2200        Nurse report is to be called to [phone] prior to patient transfer          [address] [place] [address] Worker

## 2020-02-27 NOTE — ED NOTES
Assumed care of pt  At this time  Pt  Just finished her lunch tray  Pt  respirations are relaxed  See paper charting for behavorial health observation       Beckie Benitez RN  02/27/20 1157

## 2020-02-27 NOTE — LETTER
[facility]  [address] [facility] [address]  Dept: [phone]      EMTALA TRANSFER CONSENT    NAME Katt JEROME 1980                              MRN [id number]    I have been informed of my rights regarding examination, treatment, and transfer   by Dr Chakraborty Sers: Specialized equipment and/or services available at the receiving facility (Include comment)________________________    Risks: Potential for delay in receiving treatment, Potential deterioration of medical condition, Possible worsening of condition or death during transfer      Consent for Transfer:  I acknowledge that my medical condition has been evaluated and explained to me by the emergency department physician or other qualified medical person and/or my attending physician, who has recommended that I be transferred to the service of  Accepting Physician: Bradley Mallory  at [address] [place] [address] Rd Name, [address] : [address] [place], [place] [address]  [place]   The above potential benefits of such transfer, the potential risks associated with such transfer, and the probable risks of not being transferred have been explained to me, and I fully understand them  The doctor has explained that, in my case, the benefits of transfer outweigh the risks  I agree to be transferred  I authorize the performance of emergency medical procedures and treatments upon me in both transit and upon arrival at the receiving facility  Additionally, I authorize the release of any and all medical records to the receiving facility and request they be transported with me, if possible  I understand that the safest mode of transportation during a medical emergency is an ambulance and that the Hospital advocates the use of this mode of transport   Risks of traveling to the receiving facility by car, including absence of medical control, life sustaining equipment, such as oxygen, and medical personnel has been explained to me and I fully understand them  (EVAN CORRECT BOX BELOW)  [ X ]  I consent to the stated transfer and to be transported by ambulance/helicopter  [  ]  I consent to the stated transfer, but refuse transportation by ambulance and accept full responsibility for my transportation by car  I understand the risks of non-ambulance transfers and I exonerate the Hospital and its staff from any deterioration in my condition that results from this refusal     X___________________________________________    DATE  20  TIME________  Signature of patient or legally responsible individual signing on patient behalf           RELATIONSHIP TO PATIENT_________________________          Provider Certification    NAME Kizzy Orozco                                         1980                              MRN [id number]    A medical screening exam was performed on the above named patient  Based on the examination:    Condition Necessitating Transfer The encounter diagnosis was Encounter for psychological evaluation  Patient Condition: The patient has been stabilized such that within reasonable medical probability, no material deterioration of the patient condition or the condition of the unborn child(heather) is likely to result from the transfer    Reason for Transfer: Level of Care needed not available at this facility(inpatient psych)    Transfer Requirements: Facility [address]    · Space available and qualified personnel available for treatment as acknowledged by Camille Solis ([address] [place] [address]) [phone]  · Agreed to accept transfer and to provide appropriate medical treatment as acknowledged by       DR Otoniel Sims   · Appropriate medical records of the examination and treatment of the patient are provided at the time of transfer   [facility],[address] _______  · Transfer will be performed by qualified personnel from [facility]   and [facility] transfer equipment as required, including the use of necessary and appropriate life support measures  Provider Certification: I have examined the patient and explained the following risks and benefits of being transferred/refusing transfer to the patient/family:  General risk, such as traffic hazards, adverse weather conditions, rough terrain or turbulence, possible failure of equipment (including vehicle or aircraft), or consequences of actions of persons outside the control of the transport personnel, The patient is stable for psychiatric transfer because they are medically stable, and is protected from harming him/herself or others during transport, Unanticipated needs of medical equipment and personnel during transport, Risk of worsening condition, The possibility of a transport vehicle being unavailable      Based on these reasonable risks and benefits to the patient and/or the unborn child(heather), and based upon the information available at the time of the patients examination, I certify that the medical benefits reasonably to be expected from the provision of appropriate medical treatments at another medical facility outweigh the increasing risks, if any, to the individuals medical condition, and in the case of labor to the unborn child, from effecting the transfer      X____________________________________________ DATE 02/27/20        TIME_______      ORIGINAL - SEND TO MEDICAL RECORDS   COPY - SEND WITH PATIENT DURING TRANSFER

## 2020-02-28 PROBLEM — F12.11 CANNABIS ABUSE, IN REMISSION: Chronic | Status: ACTIVE | Noted: 2020-02-28

## 2020-02-28 PROBLEM — Z00.8 MEDICAL CLEARANCE FOR PSYCHIATRIC ADMISSION: Status: ACTIVE | Noted: 2020-02-28

## 2020-02-28 PROBLEM — F29 PSYCHOTIC DISORDER (HCC): Chronic | Status: ACTIVE | Noted: 2020-02-28

## 2020-02-28 PROBLEM — F31.2 BIPOLAR I DISORDER, MOST RECENT EPISODE MANIC, SEVERE WITH PSYCHOTIC FEATURES (HCC): Chronic | Status: ACTIVE | Noted: 2020-02-28

## 2020-02-28 PROBLEM — E87.6 HYPOKALEMIA: Status: ACTIVE | Noted: 2020-02-28

## 2020-02-28 LAB
ALBUMIN SERPL BCP-MCNC: 4.8 G/DL (ref 3–5.2)
ALP SERPL-CCNC: 79 U/L (ref 43–122)
ALT SERPL W P-5'-P-CCNC: 21 U/L (ref 9–52)
AMPHETAMINES SERPL QL SCN: NEGATIVE
ANION GAP SERPL CALCULATED.3IONS-SCNC: 9 MMOL/L (ref 5–14)
AST SERPL W P-5'-P-CCNC: 22 U/L (ref 14–36)
ATRIAL RATE: 70 BPM
BACTERIA UR QL AUTO: ABNORMAL /HPF
BARBITURATES UR QL: NEGATIVE
BASOPHILS # BLD AUTO: 0.1 THOUSANDS/ΜL (ref 0–0.1)
BASOPHILS NFR BLD AUTO: 1 % (ref 0–1)
BENZODIAZ UR QL: NEGATIVE
BILIRUB SERPL-MCNC: 0.7 MG/DL
BILIRUB UR QL STRIP: NEGATIVE
BUN SERPL-MCNC: 9 MG/DL (ref 5–25)
CALCIUM SERPL-MCNC: 9.5 MG/DL (ref 8.4–10.2)
CHLORIDE SERPL-SCNC: 103 MMOL/L (ref 97–108)
CHOLEST SERPL-MCNC: 169 MG/DL
CLARITY UR: CLEAR
CO2 SERPL-SCNC: 30 MMOL/L (ref 22–30)
COCAINE UR QL: NEGATIVE
COLOR UR: ABNORMAL
CREAT SERPL-MCNC: 0.61 MG/DL (ref 0.6–1.2)
EOSINOPHIL # BLD AUTO: 0.1 THOUSAND/ΜL (ref 0–0.4)
EOSINOPHIL NFR BLD AUTO: 2 % (ref 0–6)
ERYTHROCYTE [DISTWIDTH] IN BLOOD BY AUTOMATED COUNT: 14.4 %
EST. AVERAGE GLUCOSE BLD GHB EST-MCNC: 126 MG/DL
GFR SERPL CREATININE-BSD FRML MDRD: 134 ML/MIN/1.73SQ M
GLUCOSE P FAST SERPL-MCNC: 82 MG/DL (ref 70–99)
GLUCOSE SERPL-MCNC: 82 MG/DL (ref 70–99)
GLUCOSE UR STRIP-MCNC: NEGATIVE MG/DL
HBA1C MFR BLD: 6 %
HCG SERPL QL: NEGATIVE
HCT VFR BLD AUTO: 46.1 % (ref 36–46)
HDLC SERPL-MCNC: 53 MG/DL
HGB BLD-MCNC: 15.3 G/DL (ref 12–16)
HGB UR QL STRIP.AUTO: 25
KETONES UR STRIP-MCNC: ABNORMAL MG/DL
LDLC SERPL CALC-MCNC: 102 MG/DL
LEUKOCYTE ESTERASE UR QL STRIP: 25
LYMPHOCYTES # BLD AUTO: 3.2 THOUSANDS/ΜL (ref 0.5–4)
LYMPHOCYTES NFR BLD AUTO: 35 % (ref 25–45)
MCH RBC QN AUTO: 31.9 PG (ref 26–34)
MCHC RBC AUTO-ENTMCNC: 33.3 G/DL (ref 31–36)
MCV RBC AUTO: 96 FL (ref 80–100)
METHADONE UR QL: NEGATIVE
MONOCYTES # BLD AUTO: 0.5 THOUSAND/ΜL (ref 0.2–0.9)
MONOCYTES NFR BLD AUTO: 5 % (ref 1–10)
MUCOUS THREADS UR QL AUTO: ABNORMAL
NEUTROPHILS # BLD AUTO: 5.2 THOUSANDS/ΜL (ref 1.8–7.8)
NEUTS SEG NFR BLD AUTO: 57 % (ref 45–65)
NITRITE UR QL STRIP: NEGATIVE
NON-SQ EPI CELLS URNS QL MICRO: ABNORMAL /HPF
NONHDLC SERPL-MCNC: 116 MG/DL
OPIATES UR QL SCN: NEGATIVE
P AXIS: 74 DEGREES
PCP UR QL: NEGATIVE
PH UR STRIP.AUTO: 6 [PH]
PLATELET # BLD AUTO: 333 THOUSANDS/UL (ref 150–450)
PMV BLD AUTO: 9.4 FL (ref 8.9–12.7)
POTASSIUM SERPL-SCNC: 3.2 MMOL/L (ref 3.6–5)
PR INTERVAL: 104 MS
PROT SERPL-MCNC: 8.8 G/DL (ref 5.9–8.4)
PROT UR STRIP-MCNC: ABNORMAL MG/DL
QRS AXIS: 81 DEGREES
QRSD INTERVAL: 80 MS
QT INTERVAL: 418 MS
QTC INTERVAL: 451 MS
RBC # BLD AUTO: 4.8 MILLION/UL (ref 4–5.2)
RBC #/AREA URNS AUTO: ABNORMAL /HPF
RPR SER QL: NORMAL
SODIUM SERPL-SCNC: 142 MMOL/L (ref 137–147)
SP GR UR STRIP.AUTO: 1.02 (ref 1–1.04)
T WAVE AXIS: 74 DEGREES
THC UR QL: POSITIVE
TRIGL SERPL-MCNC: 72 MG/DL
TSH SERPL DL<=0.05 MIU/L-ACNC: 1.26 UIU/ML (ref 0.47–4.68)
UROBILINOGEN UA: 4 MG/DL
VENTRICULAR RATE: 70 BPM
WBC # BLD AUTO: 9.1 THOUSAND/UL (ref 4.5–11)
WBC #/AREA URNS AUTO: ABNORMAL /HPF

## 2020-02-28 PROCEDURE — 93005 ELECTROCARDIOGRAM TRACING: CPT

## 2020-02-28 PROCEDURE — 80061 LIPID PANEL: CPT | Performed by: PSYCHIATRY & NEUROLOGY

## 2020-02-28 PROCEDURE — 84703 CHORIONIC GONADOTROPIN ASSAY: CPT | Performed by: PSYCHIATRY & NEUROLOGY

## 2020-02-28 PROCEDURE — 84443 ASSAY THYROID STIM HORMONE: CPT | Performed by: PSYCHIATRY & NEUROLOGY

## 2020-02-28 PROCEDURE — 86592 SYPHILIS TEST NON-TREP QUAL: CPT | Performed by: PSYCHIATRY & NEUROLOGY

## 2020-02-28 PROCEDURE — 81001 URINALYSIS AUTO W/SCOPE: CPT | Performed by: PSYCHIATRY & NEUROLOGY

## 2020-02-28 PROCEDURE — 99222 1ST HOSP IP/OBS MODERATE 55: CPT | Performed by: PSYCHIATRY & NEUROLOGY

## 2020-02-28 PROCEDURE — 93010 ELECTROCARDIOGRAM REPORT: CPT | Performed by: INTERNAL MEDICINE

## 2020-02-28 PROCEDURE — 80307 DRUG TEST PRSMV CHEM ANLYZR: CPT | Performed by: PSYCHIATRY & NEUROLOGY

## 2020-02-28 PROCEDURE — 80053 COMPREHEN METABOLIC PANEL: CPT | Performed by: PSYCHIATRY & NEUROLOGY

## 2020-02-28 PROCEDURE — 85025 COMPLETE CBC W/AUTO DIFF WBC: CPT | Performed by: PSYCHIATRY & NEUROLOGY

## 2020-02-28 PROCEDURE — 83036 HEMOGLOBIN GLYCOSYLATED A1C: CPT | Performed by: PSYCHIATRY & NEUROLOGY

## 2020-02-28 RX ORDER — HALOPERIDOL 5 MG
5 TABLET ORAL EVERY 6 HOURS PRN
Status: DISCONTINUED | OUTPATIENT
Start: 2020-02-28 | End: 2020-03-16 | Stop reason: HOSPADM

## 2020-02-28 RX ORDER — HYDROXYZINE HYDROCHLORIDE 25 MG/1
25 TABLET, FILM COATED ORAL EVERY 6 HOURS PRN
Status: DISCONTINUED | OUTPATIENT
Start: 2020-02-28 | End: 2020-03-16 | Stop reason: HOSPADM

## 2020-02-28 RX ORDER — BENZTROPINE MESYLATE 1 MG/1
1 TABLET ORAL EVERY 6 HOURS PRN
Status: DISCONTINUED | OUTPATIENT
Start: 2020-02-28 | End: 2020-03-16 | Stop reason: HOSPADM

## 2020-02-28 RX ORDER — POTASSIUM CHLORIDE 750 MG/1
40 TABLET, EXTENDED RELEASE ORAL ONCE
Status: COMPLETED | OUTPATIENT
Start: 2020-02-28 | End: 2020-02-28

## 2020-02-28 RX ORDER — RISPERIDONE 1 MG/1
1 TABLET, ORALLY DISINTEGRATING ORAL EVERY 4 HOURS PRN
Status: DISCONTINUED | OUTPATIENT
Start: 2020-02-28 | End: 2020-03-16 | Stop reason: HOSPADM

## 2020-02-28 RX ORDER — HYDROXYZINE 50 MG/1
50 TABLET, FILM COATED ORAL EVERY 6 HOURS PRN
Status: DISCONTINUED | OUTPATIENT
Start: 2020-02-28 | End: 2020-03-16 | Stop reason: HOSPADM

## 2020-02-28 RX ORDER — MAGNESIUM HYDROXIDE/ALUMINUM HYDROXICE/SIMETHICONE 120; 1200; 1200 MG/30ML; MG/30ML; MG/30ML
15 SUSPENSION ORAL EVERY 4 HOURS PRN
Status: DISCONTINUED | OUTPATIENT
Start: 2020-02-28 | End: 2020-03-16 | Stop reason: HOSPADM

## 2020-02-28 RX ORDER — ALBUTEROL SULFATE 90 UG/1
2 AEROSOL, METERED RESPIRATORY (INHALATION) EVERY 6 HOURS PRN
Status: DISCONTINUED | OUTPATIENT
Start: 2020-02-28 | End: 2020-03-16 | Stop reason: HOSPADM

## 2020-02-28 RX ORDER — ACETAMINOPHEN 325 MG/1
650 TABLET ORAL EVERY 4 HOURS PRN
Status: DISCONTINUED | OUTPATIENT
Start: 2020-02-28 | End: 2020-03-08

## 2020-02-28 RX ORDER — LORAZEPAM 2 MG/ML
1 INJECTION INTRAMUSCULAR EVERY 6 HOURS PRN
Status: DISCONTINUED | OUTPATIENT
Start: 2020-02-28 | End: 2020-03-16 | Stop reason: HOSPADM

## 2020-02-28 RX ORDER — NICOTINE 21 MG/24HR
21 PATCH, TRANSDERMAL 24 HOURS TRANSDERMAL DAILY
Status: DISCONTINUED | OUTPATIENT
Start: 2020-02-28 | End: 2020-02-28

## 2020-02-28 RX ORDER — OLANZAPINE 10 MG/1
10 INJECTION, POWDER, LYOPHILIZED, FOR SOLUTION INTRAMUSCULAR
Status: DISCONTINUED | OUTPATIENT
Start: 2020-02-28 | End: 2020-03-16 | Stop reason: HOSPADM

## 2020-02-28 RX ORDER — BENZTROPINE MESYLATE 1 MG/ML
1 INJECTION INTRAMUSCULAR; INTRAVENOUS EVERY 6 HOURS PRN
Status: DISCONTINUED | OUTPATIENT
Start: 2020-02-28 | End: 2020-03-16 | Stop reason: HOSPADM

## 2020-02-28 RX ORDER — DIVALPROEX SODIUM 500 MG/1
500 TABLET, DELAYED RELEASE ORAL 2 TIMES DAILY
Status: DISCONTINUED | OUTPATIENT
Start: 2020-02-28 | End: 2020-03-04

## 2020-02-28 RX ORDER — ACETAMINOPHEN 325 MG/1
975 TABLET ORAL EVERY 6 HOURS PRN
Status: DISCONTINUED | OUTPATIENT
Start: 2020-02-28 | End: 2020-03-16 | Stop reason: HOSPADM

## 2020-02-28 RX ORDER — ACETAMINOPHEN 325 MG/1
650 TABLET ORAL EVERY 6 HOURS PRN
Status: DISCONTINUED | OUTPATIENT
Start: 2020-02-28 | End: 2020-03-16 | Stop reason: HOSPADM

## 2020-02-28 RX ORDER — HALOPERIDOL 5 MG/ML
5 INJECTION INTRAMUSCULAR EVERY 6 HOURS PRN
Status: DISCONTINUED | OUTPATIENT
Start: 2020-02-28 | End: 2020-03-16 | Stop reason: HOSPADM

## 2020-02-28 RX ORDER — RISPERIDONE 1 MG/1
2 TABLET, ORALLY DISINTEGRATING ORAL 2 TIMES DAILY
Status: DISCONTINUED | OUTPATIENT
Start: 2020-02-28 | End: 2020-03-06

## 2020-02-28 RX ADMIN — POTASSIUM CHLORIDE 40 MEQ: 750 TABLET, EXTENDED RELEASE ORAL at 10:18

## 2020-02-28 RX ADMIN — ACETAMINOPHEN 650 MG: 325 TABLET ORAL at 12:18

## 2020-02-28 RX ADMIN — RISPERIDONE 2 MG: 1 TABLET, ORALLY DISINTEGRATING ORAL at 17:16

## 2020-02-28 RX ADMIN — ACETAMINOPHEN 650 MG: 325 TABLET ORAL at 16:48

## 2020-02-28 RX ADMIN — DIVALPROEX SODIUM 500 MG: 500 TABLET, DELAYED RELEASE ORAL at 11:02

## 2020-02-28 RX ADMIN — DIVALPROEX SODIUM 500 MG: 500 TABLET, DELAYED RELEASE ORAL at 17:16

## 2020-02-28 RX ADMIN — RISPERIDONE 2 MG: 1 TABLET, ORALLY DISINTEGRATING ORAL at 11:02

## 2020-02-28 NOTE — PROGRESS NOTES
Pt admitted as 89 for increasingly bizarre behavior  Significant other observed cursing and unusual statements towards children such as calling youngest "lesbian " Delusional statements in ED such as "I just became a millionaire, I'm sorry I didn't come here with my nails done " Pt is disorganized and disoriented, cannot accurately describe situation or place; thinks reason for this admission is "because she can't see from doing house work " Pt can identify visuals accurately, but also states eyes "flutter and see shades of yellow and grey" Last admission was last year for unknown reason  On admission, pt observed laughing inappropriately, quickly growing irritable and then calm depending on questions, euphoric, inappropriate, statements to RN such as "It's okay, one loser to another " Denies pain or medical complaints although reports asthma  Has not been compliant with medications and cannot recall when they were taken  Smokes cigarettes and drinks casually  Pt denies SI, HI and A/V hallucinations but appears internally stimulated with frequent delusions  Pt is poor historian

## 2020-02-28 NOTE — PLAN OF CARE
Problem: INVOLUNTARY ADMIT  Goal: Will cooperate with staff recommendations and doctor's orders and will demonstrate appropriate behavior  Description  INTERVENTIONS:  - Treat underlying conditions and offer medication as ordered  - Educate regarding involuntary admission procedures and rules  - Utilize positive consistent limit setting strategies to support patient and staff safety  Outcome: Not Progressing     Problem: DISCHARGE PLANNING  Goal: Discharge to home or other facility with appropriate resources  Description  INTERVENTIONS:  - Identify barriers to discharge w/patient and caregiver  - Arrange for needed discharge resources and transportation as appropriate  - Identify discharge learning needs (meds, wound care, etc )  - Arrange for interpretive services to assist at discharge as needed  - Refer to Case Management Department for coordinating discharge planning if the patient needs post-hospital services based on physician/advanced practitioner order or complex needs related to functional status, cognitive ability, or social support system  Outcome: Not Progressing     Problem: Ineffective Coping  Goal: Participates in unit activities  Description  Interventions:  - Provide therapeutic environment   - Provide required programming   - Redirect inappropriate behaviors   Outcome: Not Progressing     Problem: PSYCHOSIS  Goal: Will report no hallucinations or delusions  Description  Interventions:  - Administer medication as  ordered  - Every waking shifts and PRN assess for the presence of hallucinations and or delusions  - Assist with reality testing to support increasing orientation  - Assess if patient's hallucinations or delusions are encouraging self-harm or harm to others and intervene as appropriate  Outcome: Not Progressing     Problem: Alteration in Thoughts and Perception  Goal: Agree to be compliant with medication regime, as prescribed and report medication side effects  Description  Interventions:  - Offer appropriate PRN medication and supervise ingestion; conduct AIMS, as needed   Outcome: Not Progressing  Goal: Recognize dysfunctional thoughts, communicate reality-based thoughts at the time of discharge  Description  Interventions:  - Provide medication and psycho-education to assist patient in compliance and developing insight into his/her illness   Outcome: Not Progressing

## 2020-02-28 NOTE — ED NOTES
Patient resting quietly on stretcher, respirations even and unlabored, no obvious distress       Pranav Mast RN  02/27/20 3881

## 2020-02-28 NOTE — TREATMENT TEAM
02/28/20 0700   Team Meeting   Meeting Type Daily Rounds   Team Members Present   Team Members Present Physician;Nurse;; Other (Discipline and Name)   Physician Team Member Dr Sandor Seals Team Member [id number]   Other (Discipline and Name) Chelsey Renteria NP, medical student,    Patient/Family Present   Patient Present No   Patient's Family Present No   Dr Montour will be taking as a patient

## 2020-02-28 NOTE — QUICK NOTE
Unable to complete medical clearance consult on patient as she was sleeping  Patient had been up most of the night due to psychosis

## 2020-02-28 NOTE — CASE MANAGEMENT
Treatment team met with Pt to discuss treatment plan  Pt signed but did not appear to understand document  Pt stated she wanted to leave and go talk with her peers  Pt stated she has no mental health issues and is only inpatient to get the weed out of her system  Pt stated she was having problems with her eyes and can't participate until they are fixed but then wanted to sign treatment plan  Pt was confused with flight of ideas, delusional, and gestures were exaggerated

## 2020-02-28 NOTE — CASE MANAGEMENT
Patient was admitted to [facility] on a 302 admission from [facility]"" ER  Patient was calm and cooperative with this CM for intake assessment  Patient reported to the ER due to increasing psychosis and bizarre behaviors  Patient is unable to state why she is currently inpatient, but states she has been to inpatient treatment 7 times in the last 24 years  Patient was alert and orientated, with delusional thought content during this assessment  Patient was polite and pleasant  Patient was able to answer some questions with appropriate responses, but not to others  Patient currently rates her depression as 0/10  Patient denies any suicidal or homicidal ideation  Patient currently rates her anxiety as 2/10  Patient denies any auditory or visual hallucinations  Patient denies any referral for D&A services  UDS not completed on admission  Patient states she did use marijuana, but not since August 2019  Patient denies any other abuse  Patient has outpatient services provided by Sona Alicia at [facility]   Patient does not have outpatient mental health services  Patient insurance is [address]  Patient currently unable/unwilling to identify her income  Patient states she has a checking account  Patient does not have a 's license  Patient relies on family for transportation  Patient unable to identify any interest/coping skills  When asked, patient started talking about having hien rings and it was "time to take them off"  Patient denies access to any firearms  Patient denies any legal concerns  Patient strengths are:  Currently insured, stable living arrangement  Patient stressors are:  Active psychosis

## 2020-02-28 NOTE — TREATMENT TEAM
PINA GROUP NOTE  Able to maintain focus on topical discussion/activity, but otherwise loud and intrusive  Social with select peers        02/28/20 1000   Activity/Group Checklist   Group Life Skills  (Coping Skills/Distress Management)   Attendance Attended   Attendance Duration (min) 46-60   Interactions Other (Comment)  (Loud, Hyper Verbal  )   Affect/Mood Wide   Goals Achieved Discussed coping strategies

## 2020-02-28 NOTE — PROGRESS NOTES
Patient disorganized  Labile  Irritable  Became agitated after RN asked patient to  her bathroom because she had sanitary pads laying on the bathroom floor  Talking and shouting to self periodically while walking in the halls  Was compliant with scheduled dose of Depakote and Risperdal  Currently resting in bed

## 2020-02-28 NOTE — PROGRESS NOTES
Pt visible on unit  Remains disorganized and labile  Denies SI/HI and hallucinations  Came to NS to request tylenol for menstrual cramps and took scheduled meds without difficultly  Will monitor

## 2020-02-28 NOTE — ED NOTES
Spoke with Merna Sherman at [facility] [phone] who reported an authorization is not needed but he did provide and PETE # [id number]

## 2020-02-28 NOTE — CONSULTS
Consult- Walt Sánchez 1980, 42 y o  female MRN: [id number]    Unit/Bed#: [facility]-02 Encounter: [id number]    Primary Care Provider: Naomi Duran MD   Date and time admitted to hospital: 2/27/2020 10:28 PM      Inpatient consult for Medical Clearance for [address] patient  Consult performed by: FRANCISCA Escobedo  Consult ordered by: Fantasma Richard DO          No new Assessment & Plan notes have been filed under this hospital service since the last note was generated  Service: Internal Medicine    Recommendations for Discharge:  Per psych     History of Present Illness:    Walt Sánchez is a 42 y o  female who is originally admitted to the psychiatry service on 2/27/2020 due to encounter for psychiatric evaluation  Patient was brought to [place] [address]  on 2/27/2020 by [facility] after receiving call from patient's family stating patient had been off her medications for weeks and she had incoherent, rambling speech  Per records, she had been on a monthly injectable medication (unknown name) with last dose in December  Patient denied SI/HI/AH/VH  Per records, patient has known bipolar disorder  She has had weight loss, insomnia, paranoia and had not been eating  We are consulted for medical clearance  Her  signed [phone]  Review of records reveal medical history is significant for bipolar disorder and asthma  Her home medications consisted of albuterol 90 mcg/act 2 puffs q6h as needed, multi vitamin  She is a former smoker (1 5 pdd), 1-2 drinks 2-3 times per week, no substance abuse noted  Review of blood work since admission reveals normal TSH, normal CBC, CMP WNL except for potassium of 3 2  Fasting lipid profile WNL  Pending labs include RPR and hemoglobin A1C  She was treated with potassium chloride 40 mEq this morning with repeat potassium ordered for today       Review of Systems:    Review of Systems    Past Medical and Surgical History:     Past Medical History:   Diagnosis Date    Asthma     Bipolar disorder ([facility] [address] )        Past Surgical History:   Procedure Laterality Date    NO PAST SURGERIES         Meds/Allergies:    all medications and allergies reviewed    Allergies: No Known Allergies    Social History:     Marital Status: Single    Substance Use History:   Social History     Substance and Sexual Activity   Alcohol Use Yes    Frequency: 2-3 times a week    Drinks per session: 1 or 2    Binge frequency: Never    Comment: Weekends     Social History     Tobacco Use   Smoking Status Former Smoker    Packs/day: 1 50    Types: Cigarettes   Smokeless Tobacco Never Used     Social History     Substance and Sexual Activity   Drug Use Never       Family History:    non-contributory    Physical Exam:     Vitals:   Blood Pressure: (!) 166/108 (02/28/20 1117)  Pulse: 98 (02/28/20 1117)  Temperature: 97 8 °F (36 6 °C) (02/28/20 0715)  Temp Source: Temporal (02/28/20 0715)  Respirations: 16 (02/28/20 1117)  Height: 5' 4" (162 6 cm) (02/27/20 2238)  Weight - Scale: 62 7 kg (138 lb 3 7 oz) (02/27/20 2238)  SpO2: 99 % (02/27/20 2238)    Physical Exam    Additional Data:     Lab Results: I have personally reviewed pertinent reports  Results from last 7 days   Lab Units 02/28/20  0539   WBC Thousand/uL 9 10   HEMOGLOBIN g/dL 15 3   HEMATOCRIT % 46 1*   PLATELETS Thousands/uL 333   NEUTROS PCT % 57   LYMPHS PCT % 35   MONOS PCT % 5   EOS PCT % 2     Results from last 7 days   Lab Units 02/28/20  0539   POTASSIUM mmol/L 3 2*   CHLORIDE mmol/L 103   CO2 mmol/L 30   BUN mg/dL 9   CREATININE mg/dL 0 61   CALCIUM mg/dL 9 5   ALK PHOS U/L 79   ALT U/L 21   AST U/L 22           Imaging: I have personally reviewed pertinent reports  No results found  EKG, Pathology, and Other Studies Reviewed on Admission:   EKG: No EKG to review this admission  M*ESBATech software was used to dictate this note    It may contain errors with dictating incorrect words or incorrect spelling  Please contact the provider directly with any questions

## 2020-02-28 NOTE — H&P
Psychiatric Evaluation - Behavioral Health     Identification Data:Kendra Bustillos 42 y o  female MRN: [id number]  Unit/Bed#: [facility] 345-02 Encounter: [id number]    Chief Complaint: psychotic symptoms and bizarre behavior    History of Present Illness     Wanda Osorio is a 42 y o  female with a history of Bipolar Disorder who was admitted to the inpatient psychiatric unit on a involuntary 302 commitment basis due to psychotic symptoms and bizarre behavior  Symptoms prior to admission included poor concentration, poor appetite, difficulty sleeping, mood swings, increased irritability, bizarre behavior, delusional thinking with persecutory delusions, noncompliance with treatment and noncompliance with medications  Onset of symptoms was gradual starting several weeks ago with progressively worsening course since that time  Stressors preceding admission included chronic mental illness and noncompliance with treatment  Quentin Cano was brought in to ED by police due to bizarre behavior and psychotic symptoms  She was non-compliant with her psychiatric medications that included long acting injectable medications  She was not sleeping at home, not eating and was unable to manage her children  Family was concerned about her safety and safety of her children  On admission she was noted to have persecutory and grandiose delusions talking about "just becoming a millionaire"  She was disorganized, disoriented and difficulty providing psychiatric history  On initial evaluation after admission to the inpatient psychiatric unit Quentin Cano was cooperative with assessment, but disorganized, labile and grandiose  She claimed that she was in the hospital "to get detoxed from marijuana" (but at the same time denied any marijuana use for the last 7 months)   She was unable to provide reliable psychiatric history or describe events prior to admission " took me to the hospital  I am waiting for them to examine my eyes " She was resistive to start psychiatric medications "I don't need any medications  I had bipolar disorder in the past, but not now", but eventually agreed to start treatment with a mood stabilizer and an antipsychotic agent      Psychiatric Review Of Systems:    Sleep changes: yes, decreased  Appetite changes: yes, decreased  Weight changes: yes, weight loss 20 lb  Energy/anergy: yes, increased  Interest/pleasure/anhedonia: no  Somatic symptoms: no  Anxiety/panic: yes  Luna: yes, currently manic symptoms  Guilty/hopeless: no  Self injurious behavior/risky behavior: no  Suicidal ideation: no  Homicidal ideation: no  Auditory hallucinations: denies when asked, but appears responding to internal stimuli  Visual hallucinations: no  Other hallucinations: no  Delusional thinking: yes, persecutory delusions, grandiose delusions  Eating disorder history: no  Obsessive/compulsive symptoms: no    Historical Information     Past Psychiatric History:     Past Inpatient Psychiatric Treatment:   Multiple past inpatient [facility] admissions at [facility]  Past Outpatient Psychiatric Treatment:    Was in outpatient psychiatric treatment in the past with a psychiatrist at facility [place] [id number] with outpatient psychiatric treatment prior to admission  Past Suicide Attempts: no  Past Violent Behavior: no  Past Psychiatric Medication Trials: Depakote, Tegretol, Lithium, Risperdal, Abilify, Haldol, Thorazine, Abilify Maintena and Haldol Decanoate     Substance Abuse History:    Social History     Tobacco History     Smoking Status  Current Every Day Smoker Smoking Frequency  1 5 packs/day Smoking Tobacco Type  Cigarettes    Smokeless Tobacco Use  Never Used          Alcohol History     Alcohol Use Status  Yes Comment  Weekends          Drug Use     Drug Use Status  Never          Sexual Activity     Sexually Active  Not Currently          Activities of Daily Living    Not Asked               Additional Substance Use Detail     Questions Responses    Substance Use Assessment Substance use within the past 12 months    Alcohol Use Frequency 3 or more times/week    Cannabis frequency 3 or more times/week    Comment: 3 or more times/week on 2/27/2020     Heroin Frequency Denies use in past 12 months    Alcohol Drink of Choice wine    Cannabis method Smoke    Comment: Smoke on 2/27/2020     1st Use of Alcohol ANGELIKA    Cannabis 1st Use 21 yrs    Last Use of Alcohol & Amount ANGELIKA    Longest Abstinence from Alcohol ANGELIKA    Cannabis Longest Abstinence ANGELIKA    Cocaine frequency Never used    Comment: Never used on 2/27/2020     Crack Cocaine Frequency Denies use in past 12 months    Methamphetamine Frequency Denies use in past 12 months    Narcotic Frequency Denies use in past 12 months    Benzodiazepine Frequency Denies use in past 12 months    Amphetamine frequency Denies use in past 12 months    Barbiturate Frequency Denies use in past 12 months    Inhalant frequency Never used    Comment: Never used on 2/27/2020     Hallucinogen frequency Never used    Comment: Never used on 2/27/2020     Ecstasy frequency Never used    Comment: Never used on 2/27/2020     Other drug frequency Never used    Comment: Never used on 2/27/2020     Opiate frequency Denies use in past 12 months    Last reviewed by Tasha Liu RN on 2/27/2020        I have assessed this patient for substance use within the past 12 months    Alcohol use: drinks on weekends, 3 glasses of wine at a time, for 21 years, last use was few weeks ago  Recreational drug use:   Cocaine:  denies use  Heroin:  denies use  Marijuana:  denies current use, history of past use, used daily, for 24 years, last use was 7 months ago  Other drugs: denies use   Longest clean time: 7 years  History of Inpatient/Outpatient rehabilitation program: Yes, 5 times, at facilities in [place]  Smoking history: denies use  Use of caffeine: 2 cups of coffee per day and 1 cup of tea per day    Family Psychiatric History:     Psychiatric Illness:  no family history of psychiatric illness  Substance Abuse:  no family history of substance abuse  Suicide Attempts:  no family history of suicide attempts    Social History:    Education: some college  Learning Disabilities: none  Marital History: common law marriage  Children: 3 daughter 9years old, 2 sons 5and 6years old  Living Arrangement: lives in home with common-law  and 3 children  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, currently unemployed  Functioning Relationships: states that her  is supportive, poor relationship with common-law   Legal History: no current legal problems, past incarceration due to drug possession   History: None    Traumatic History:     Abuse: none  Other Traumatic Events: none     Past Medical History:    History of Seizures: no  History of Head injury with loss of consciousness: no    Past Medical History:   Diagnosis Date    Asthma     Bipolar disorder ([facility] [address] )      Past Surgical History:   Procedure Laterality Date    NO PAST SURGERIES         Medical Review Of Systems:    A comprehensive review of systems was negative except for: Behavioral/Psych: positive for appetite disturbance, delusional thoughts, manic symptoms, mood swings, poor self care and sleep disturbance    Allergies:    No Known Allergies    Medications: All current active medications have been reviewed  Medications prior to admission:    Prior to Admission Medications   Prescriptions Last Dose Informant Patient Reported? Taking?    Multiple Vitamin (MULTIVITAMIN) tablet 2/27/2020 at Unknown time Self Yes Yes   Sig: Take 1 tablet by mouth daily   acetaminophen (TYLENOL) 500 mg tablet Not Taking at Unknown time Self No No   Sig: Take 1 tablet (500 mg total) by mouth every 6 (six) hours as needed for mild pain or moderate pain   Patient not taking: Reported on 2/27/2020   albuterol (PROVENTIL HFA,VENTOLIN HFA) 90 mcg/act inhaler Past Month at Unknown time Self Yes Yes   Sig: Inhale 2 puffs every 6 (six) hours as needed for wheezing   benzonatate (TESSALON PERLES) 100 mg capsule Not Taking at Unknown time  No No   Sig: Take 1 capsule (100 mg total) by mouth 3 (three) times a day as needed for cough   Patient not taking: Reported on 2/27/2020   diclofenac sodium (VOLTAREN) 1 % Not Taking at Unknown time Self No No   Sig: Apply 2 g topically 4 (four) times a day   Patient not taking: Reported on 2/27/2020      Facility-Administered Medications: None       OBJECTIVE:    Vital signs in last 24 hours:    Temp:  [97 5 °F (36 4 °C)-97 8 °F (36 6 °C)] 97 8 °F (36 6 °C)  HR:  [63-83] 65  Resp:  [16-18] 16  BP: (128-160)/() 142/84    No intake or output data in the 24 hours ending 02/28/20 1018     Mental Status Evaluation:    Appearance:  disheveled, dressed in hospital attire   Behavior:  cooperative, restless and fidgety   Speech:  pressured, tangential   Mood:  labile, manic   Affect:  labile   Language: naming objects and repeating phrases   Thought Process:  disorganized, illogical, tangential   Associations: tangential associations   Thought Content:  grandiose and persecutory delusions   Perceptual Disturbances: denies auditory or visual hallucinations when asked, but appears preoccupied   Risk Potential: Suicidal ideation - None  Homicidal ideation - None  Potential for aggression - No   Sensorium:  oriented to person, place and time/date   Memory:  recent and remote memory grossly intact   Consciousness:  alert and awake   Attention: poor concentration and poor attention span   Intellect: average   Fund of Knowledge: awareness of current events: yes  past history: yes  vocabulary: normal   Insight:  poor   Judgment: poor   Muscle Strength Muscle Tone: normal  normal   Gait/Station: normal gait/station, normal balance   Motor Activity: no abnormal movements       Laboratory Results: I have personally reviewed all pertinent laboratory/tests results    Admission Labs:   Admission on 02/27/2020   Component Date Value    Amph/Meth UR 02/28/2020 Negative     Barbiturate Ur 02/28/2020 Negative     Benzodiazepine Urine 02/28/2020 Negative     Cocaine Urine 02/28/2020 Negative     Methadone Urine 02/28/2020 Negative     Opiate Urine 02/28/2020 Negative     PCP Ur 02/28/2020 Negative     THC Urine 02/28/2020 Positive*    Color, UA 02/28/2020 Lisa*    Clarity, UA 02/28/2020 Clear     Specific Gravity, UA 02/28/2020 1 025     pH, UA 02/28/2020 6 0     Leukocytes, UA 02/28/2020 25 0*    Nitrite, UA 02/28/2020 Negative     Protein, UA 02/28/2020 30 (1+)*    Glucose, UA 02/28/2020 Negative     Ketones, UA 02/28/2020 50 (2+)*    Bilirubin, UA 02/28/2020 Negative     Blood, UA 02/28/2020 25 0*    UROBILINOGEN UA 02/28/2020 4 0*    WBC 02/28/2020 9 10     RBC 02/28/2020 4 80     Hemoglobin 02/28/2020 15 3     Hematocrit 02/28/2020 46 1*    MCV 02/28/2020 96     MCH 02/28/2020 31 9     MCHC 02/28/2020 33 3     RDW 02/28/2020 14 4     MPV 02/28/2020 9 4     Platelets 18/76/5076 333     Neutrophils Relative 02/28/2020 57     Lymphocytes Relative 02/28/2020 35     Monocytes Relative 02/28/2020 5     Eosinophils Relative 02/28/2020 2     Basophils Relative 02/28/2020 1     Neutrophils Absolute 02/28/2020 5 20     Lymphocytes Absolute 02/28/2020 3 20     Monocytes Absolute 02/28/2020 0 50     Eosinophils Absolute 02/28/2020 0 10     Basophils Absolute 02/28/2020 0 10     Sodium 02/28/2020 142     Potassium 02/28/2020 3 2*    Chloride 02/28/2020 103     CO2 02/28/2020 30     ANION GAP 02/28/2020 9     BUN 02/28/2020 9     Creatinine 02/28/2020 0 61     Glucose 02/28/2020 82     Glucose, Fasting 02/28/2020 82     Calcium 02/28/2020 9 5     AST 02/28/2020 22     ALT 02/28/2020 21     Alkaline Phosphatase 02/28/2020 79     Total Protein 02/28/2020 8 8*    Albumin 02/28/2020 4 8     Total Bilirubin 02/28/2020 0 70     eGFR 02/28/2020 134     Preg, Serum 02/28/2020 Negative     Cholesterol 02/28/2020 169     Triglycerides 02/28/2020 72     HDL, Direct 02/28/2020 53     LDL Calculated 02/28/2020 102     Non-HDL-Chol (CHOL-HDL) 02/28/2020 116     RPR 02/28/2020 Non-Reactive     TSH 3RD GENERATON 02/28/2020 1 260     Hemoglobin A1C 02/28/2020 6 0*    EAG 02/28/2020 126     RBC, UA 02/28/2020 1-2*    WBC, UA 02/28/2020 2-4*    Epithelial Cells 02/28/2020 Occasional     Bacteria, UA 02/28/2020 None Seen     MUCUS THREADS 02/28/2020 Moderate*   Drug Screen:   Lab Results   Component Value Date    AMPMETHUR Negative 02/28/2020    BARBTUR Negative 02/28/2020    BDZUR Negative 02/28/2020    THCUR Positive (A) 02/28/2020    COCAINEUR Negative 02/28/2020    METHADONEUR Negative 02/28/2020    OPIATEUR Negative 02/28/2020    PCPUR Negative 02/28/2020       Imaging Studies: No results found  Code Status: Level 1 - Full Code  Advance Directive and Living Will: <no information>    Suicide/Homicide Risk Assessment:    Risk of Harm to Self:   Nursing Suicide Risk Assessment Last 24 hours: Low Risk to Self: No evidence of suicidal thoughts or history; Moderate Risk to Self: Presence of delusions  , Increasing use of alcohol or other substances  ;    Demographic risk factors include: never   Historical Risk Factors include: history of mood disorder  Current Specific Risk Factors include: diagnosis of mood disorder, current unstable mood, current psychotic symptoms, poor reasoning, poor impulse control  Protective Factors: no current suicidal ideation, ability to communicate with staff on the unit, taking medications as ordered on the unit  Weapons/Firearms: none   The following steps have been taken to ensure weapons are properly secured: not applicable  Based on today's assessment, Guera Horton presents the following risk of harm to self: moderate    Risk of Harm to Others:  Nursing Homicide Risk Stuart Perez MD   aluminum-magnesium hydroxide-simethicone 15 mL Oral Q4H PRN Susana Spangler MD   benztropine 1 mg Intramuscular Q6H PRN Susana Spangler, MD   benztropine 1 mg Oral Q6H PRN Susana Spangler, MD   divalproex sodium 500 mg Oral BID Susana Spangler MD   haloperidol 5 mg Oral Q6H PRN Susana Spangler, MD   haloperidol lactate 5 mg Intramuscular Q6H PRN Susana Spangler, MD   hydrOXYzine HCL 25 mg Oral Q4H PRN Jenni Bland,    hydrOXYzine HCL 25 mg Oral Q6H PRN Susana Spangler, MD   hydrOXYzine HCL 50 mg Oral Q6H PRN Susana Spangler, MD   hydrOXYzine HCL 75 mg Oral Q6H PRN Susana Spangler, MD   influenza vaccine 0 5 mL Intramuscular Once Verba Juanita, MD   LORazepam 1 mg Intramuscular Q6H PRN Susana Spangler MD   magnesium hydroxide 30 mL Oral Daily PRN Susana Spangler, MD   OLANZapine 10 mg Intramuscular Q3H PRN Susana Spangler, MD   OLANZapine 5 mg Oral Q3H PRN Jenni Bland,    risperiDONE 1 mg Oral Q4H PRN Susana Spangler, MD   risperiDONE 2 mg Oral BID Susana Spangler, MD   traZODone 50 mg Oral HS PRN Jenni Bland,        Risks / Benefits of Treatment:    Risks, benefits, and possible side effects of medications explained to patient including risk of liver impairment related to treatment with Depakote and risk of parkinsonian symptoms, Tardive Dyskinesia and metabolic syndrome related to treatment with antipsychotic medications  The patient verbalizes understanding and agreement for treatment  Risks of medications in pregnancy explained if female patient  Patient verbalizes understanding and agrees to notify her doctor if she becomes pregnant  Counseling / Coordination of Care:    Patient's presentation on admission and proposed treatment plan discussed with treatment team   Diagnosis, medication changes and treatment plan reviewed with patient    Stressors preceding admission discussed with patient including chronic mental illness and difficulty with anger management  Events leading to admission reviewed with patient  Inpatient Psychiatric Certification:    Estimated length of stay: 20 midnights    Based upon physical, mental and social evaluations, I certify that inpatient psychiatric services are medically necessary for this patient for a duration of 20 midnights for the treatment of Bipolar I disorder, most recent episode manic, severe with psychotic features [facility])  Available alternative community resources do not meet the patient's mental health care needs  I further attest that an established written individualized plan of care has been implemented and is outlined in the patient's medical records    The patient has been released from the Emergency Department and medically cleared as per Emergency Department documentation for psychiatric admission for Bipolar I disorder, most recent episode manic[facility]      Hiwot Zamarripa MD 02/28/20

## 2020-02-28 NOTE — PLAN OF CARE
Problem: Alteration in Thoughts and Perception  Goal: Refrain from acting on delusional thinking/internal stimuli  Description  Interventions:  - Monitor patient closely, per order   - Utilize least restrictive measures   - Set reasonable limits, give positive feedback for acceptable   - Administer medications as ordered and monitor of potential side effects  Outcome: Not Progressing  Goal: Attend and participate in unit activities, including therapeutic, recreational, and educational groups  Description  Interventions:  -Encourage Visitation and family involvement in care  Outcome: Not Progressing  Goal: Recognize dysfunctional thoughts, communicate reality-based thoughts at the time of discharge  Description  Interventions:  - Provide medication and psycho-education to assist patient in compliance and developing insight into his/her illness   Outcome: Not Progressing     Problem: INVOLUNTARY ADMIT  Goal: Will cooperate with staff recommendations and doctor's orders and will demonstrate appropriate behavior  Description  INTERVENTIONS:  - Treat underlying conditions and offer medication as ordered  - Educate regarding involuntary admission procedures and rules  - Utilize positive consistent limit setting strategies to support patient and staff safety  Outcome: Not Progressing

## 2020-02-28 NOTE — ASSESSMENT & PLAN NOTE
Potassium level this a  m was 3 2  She was tx with potassium chloride 40 mEq x 1 dose  Repeat K+ level pending

## 2020-02-29 PROBLEM — R73.03 PREDIABETES: Status: ACTIVE | Noted: 2020-02-29

## 2020-02-29 PROBLEM — M79.605 PAIN IN BOTH LOWER EXTREMITIES: Status: ACTIVE | Noted: 2020-02-29

## 2020-02-29 PROBLEM — M79.604 PAIN IN BOTH LOWER EXTREMITIES: Status: ACTIVE | Noted: 2020-02-29

## 2020-02-29 LAB — POTASSIUM SERPL-SCNC: 4 MMOL/L (ref 3.6–5)

## 2020-02-29 PROCEDURE — 99232 SBSQ HOSP IP/OBS MODERATE 35: CPT | Performed by: FAMILY MEDICINE

## 2020-02-29 PROCEDURE — 99232 SBSQ HOSP IP/OBS MODERATE 35: CPT | Performed by: PSYCHIATRY & NEUROLOGY

## 2020-02-29 PROCEDURE — 84132 ASSAY OF SERUM POTASSIUM: CPT | Performed by: PSYCHIATRY & NEUROLOGY

## 2020-02-29 RX ADMIN — RISPERIDONE 2 MG: 1 TABLET, ORALLY DISINTEGRATING ORAL at 17:19

## 2020-02-29 RX ADMIN — DIVALPROEX SODIUM 500 MG: 500 TABLET, DELAYED RELEASE ORAL at 17:19

## 2020-02-29 RX ADMIN — ACETAMINOPHEN 650 MG: 325 TABLET ORAL at 09:36

## 2020-02-29 RX ADMIN — ACETAMINOPHEN 650 MG: 325 TABLET ORAL at 14:23

## 2020-02-29 RX ADMIN — RISPERIDONE 2 MG: 1 TABLET, ORALLY DISINTEGRATING ORAL at 08:28

## 2020-02-29 RX ADMIN — DIVALPROEX SODIUM 500 MG: 500 TABLET, DELAYED RELEASE ORAL at 08:27

## 2020-02-29 NOTE — ASSESSMENT & PLAN NOTE
Stable  Potassium noted to be 3 2 on admission  Replaced with Potassium Chloride 40 mEq with appropriate response, today Potassium of 3 7

## 2020-02-29 NOTE — TREATMENT PLAN
psychiatric evaluation/assessment process     Treatment Frequency: daily medication monitoring, group and milieu therapy daily, monitoring through interdisciplinary rounds, monitoring through weekly patient care conferences    Expected Discharge Date: 20 days - 3/19/2020    Discharge Plan: referral for outpatient medication management with a psychiatrist, referral for outpatient psychotherapy, return to previous living arrangement    Treatment Plan Created/Updated By: Venecia Hancock MD

## 2020-02-29 NOTE — PROGRESS NOTES
Pt c/o severe pain in her legs b/l where she has varicose veins  She declined tylenol as it did not help earlier when taken for abdominal pain  Pt encouraged to lay in bed with legs elevated, cooperative   Placed on medical list

## 2020-02-29 NOTE — PROGRESS NOTES
Patient pleasant and cooperative on approach  Patient asked for another heating for her cramps and was provided one  Patient denies all psych symptoms  Patient visible at times  Will continue to monitor

## 2020-02-29 NOTE — PROGRESS NOTES
Patient requested additional medication for menstrual cramping--was given Tylenol po and a heating pad

## 2020-02-29 NOTE — PLAN OF CARE
Problem: Alteration in Thoughts and Perception  Goal: Agree to be compliant with medication regime, as prescribed and report medication side effects  Description  Interventions:  - Offer appropriate PRN medication and supervise ingestion; conduct AIMS, as needed   Outcome: Progressing  Goal: Recognize dysfunctional thoughts, communicate reality-based thoughts at the time of discharge  Description  Interventions:  - Provide medication and psycho-education to assist patient in compliance and developing insight into his/her illness   Outcome: Progressing     Problem: INVOLUNTARY ADMIT  Goal: Will cooperate with staff recommendations and doctor's orders and will demonstrate appropriate behavior  Description  INTERVENTIONS:  - Treat underlying conditions and offer medication as ordered  - Educate regarding involuntary admission procedures and rules  - Utilize positive consistent limit setting strategies to support patient and staff safety  Outcome: Progressing

## 2020-02-29 NOTE — PROGRESS NOTES
Patient was cooperative with medications and elated, pressured, and tangential in conversation this morning  Reported improved sleep and appetite since she's been here  Said her varicose veins have resolved  Said she feels "good" and denied other symptoms and needs, including anxiety

## 2020-02-29 NOTE — ASSESSMENT & PLAN NOTE
Symptoms of left calf tenderness on initial evaluation with negative Winsome' sign  No new symptoms reported  PERC score is 0 and patient is low risk for DVT  Lower extremity ultrasound done on 3/2/2020 negative for acute or chronic DVT  - Tylenol for pain control - patient agreeable with plan

## 2020-02-29 NOTE — CONSULTS
Inpatient Consultation - Sergei Jj    Patient Information: Daisha Pulliam 42 y o  female MRN: [id number]  Unit/Bed#: [facility] 345-02 Encounter: [id number]  PCP: Luana Bassett MD  Date of Admission:  2/27/2020  Date of Consultation: 02/29/20  Requesting Physician: Adama Parks MD    Reason For Consultation:   Medical management  Assessment/Plan:    Pain in both lower extremities  Assessment & Plan  Mild tenderness on palpation of left calf and right anterior leg, no edema appreciated  Homans sign is negative bilaterally  PERC score is 0 and patient is low risk for DVT  - No requirement for lower extremity ultrasound  - Tylenol for pain control - patient agreeable with plan  Prediabetes  Assessment & Plan  HgbA1c on admission noted to be 6 0   - Diet and exercise counseling  Hypokalemia  Assessment & Plan  Potassium noted to be 3 2 on admission  Replaced with Potassium Chloride 40 mEq with appropriate response today with Potassium of 4 0  Mild intermittent asthma without complication  Assessment & Plan  Albuterol rescue inhaler, 2 puff q6h PRN  - Continue home medication      VTE Prophylaxis: Reason for no pharmacologic prophylaxis Patient ambulating   / reason for no mechanical VTE prophylaxis Patient ambulating     Recommendations for Discharge:  · PCP follow-up after discharge  Counseling / Coordination of Care Time: 30 minutes  Greater than 50% of total time spent on patient counseling and coordination of care  History of Present Illness:    Daisha Pulliam is a 42 y o  female who is originally admitted to the [address] [place] [address] service on 2/27/2020 due to Bipolar I Disorder, most recent episode manic, severe  We are consulted for Medical Management  Patient complains of bilateral lower extremity pain that does not radiate anywhere  She denies any past medical history of DVT or PE  She denies swelling in the lower extremities      Review of Systems:    Review of Systems   Constitutional: Negative for activity change, appetite change and fever  Respiratory: Negative for shortness of breath  Cardiovascular: Negative for chest pain  Gastrointestinal: Negative for abdominal distention  Musculoskeletal:        Bilateral lower extremity pain  Skin: Negative for rash  Neurological: Negative for headaches  Past Medical and Surgical History:   Past Medical History:   Diagnosis Date    Asthma     Bipolar disorder ([facility] [address] )      Past Surgical History:   Procedure Laterality Date    NO PAST SURGERIES       Meds/Allergies: Allergies: No Known Allergies  Prior to Admission Medications   Prescriptions Last Dose Informant Patient Reported? Taking?    Multiple Vitamin (MULTIVITAMIN) tablet 2/27/2020 at Unknown time Self Yes Yes   Sig: Take 1 tablet by mouth daily   acetaminophen (TYLENOL) 500 mg tablet Not Taking at Unknown time Self No No   Sig: Take 1 tablet (500 mg total) by mouth every 6 (six) hours as needed for mild pain or moderate pain   Patient not taking: Reported on 2/27/2020   albuterol (PROVENTIL HFA,VENTOLIN HFA) 90 mcg/act inhaler Past Month at Unknown time Self Yes Yes   Sig: Inhale 2 puffs every 6 (six) hours as needed for wheezing   benzonatate (TESSALON PERLES) 100 mg capsule Not Taking at Unknown time  No No   Sig: Take 1 capsule (100 mg total) by mouth 3 (three) times a day as needed for cough   Patient not taking: Reported on 2/27/2020   diclofenac sodium (VOLTAREN) 1 % Not Taking at Unknown time Self No No   Sig: Apply 2 g topically 4 (four) times a day   Patient not taking: Reported on 2/27/2020      Facility-Administered Medications: None     Social History:     Social History     Socioeconomic History    Marital status: Single     Spouse name: Not on file    Number of children: Not on file    Years of education: Not on file    Highest education level: Not on file   Occupational History    Not on file   Social Needs    Financial resource strain: Not on file    Food insecurity:     Worry: Not on file     Inability: Not on file    Transportation needs:     Medical: Not on file     Non-medical: Not on file   Tobacco Use    Smoking status: Former Smoker     Packs/day: 1 50     Types: Cigarettes    Smokeless tobacco: Never Used   Substance and Sexual Activity    Alcohol use: Yes     Frequency: 2-3 times a week     Drinks per session: 1 or 2     Binge frequency: Never     Comment: Weekends    Drug use: Never    Sexual activity: Not Currently   Lifestyle    Physical activity:     Days per week: Not on file     Minutes per session: Not on file    Stress: Not on file   Relationships    Social connections:     Talks on phone: Not on file     Gets together: Not on file     Attends Buddhism service: Not on file     Active member of club or organization: Not on file     Attends meetings of clubs or organizations: Not on file     Relationship status: Not on file    Intimate partner violence:     Fear of current or ex partner: Not on file     Emotionally abused: Not on file     Physically abused: Not on file     Forced sexual activity: Not on file   Other Topics Concern    Not on file   Social History Narrative    Not on file       Family History:  Family History   Family history unknown: Yes       Physical Exam:     Vitals:   Blood Pressure: 123/73 (02/29/20 0712)  Pulse: 103 (02/29/20 0712)  Temperature: 97 9 °F (36 6 °C) (02/29/20 0712)  Temp Source: Temporal (02/29/20 0712)  Respirations: 16 (02/29/20 0712)  Height: 5' 4" (162 6 cm) (02/27/20 2238)  Weight - Scale: 62 7 kg (138 lb 3 7 oz) (02/27/20 2238)  SpO2: 99 % (02/27/20 2238)    Physical Exam   Constitutional: She appears well-developed and well-nourished  No distress  HENT:   Head: Normocephalic  Eyes: Conjunctivae and EOM are normal  Right eye exhibits no discharge  Left eye exhibits no discharge  No scleral icterus  Neck: Normal range of motion  Neck supple  Cardiovascular: Normal rate, regular rhythm and normal heart sounds  No murmur heard  Pulmonary/Chest: Effort normal and breath sounds normal  No respiratory distress  She has no wheezes  Abdominal: Soft  Bowel sounds are normal  She exhibits no distension  There is no tenderness  Musculoskeletal: Normal range of motion  She exhibits tenderness (Mild tenderness on palpation of the left calf and palpation of right shin anteriorly  )  She exhibits no edema  Neurological: She is alert  Skin: Skin is warm  No rash noted  She is not diaphoretic  No erythema  Additional Data:     Lab Results: I have personally reviewed pertinent reports  Results from last 7 days   Lab Units 02/28/20  0539   WBC Thousand/uL 9 10   HEMOGLOBIN g/dL 15 3   HEMATOCRIT % 46 1*   PLATELETS Thousands/uL 333   NEUTROS PCT % 57   LYMPHS PCT % 35   MONOS PCT % 5   EOS PCT % 2     Results from last 7 days   Lab Units 02/29/20  0559 02/28/20  0539   POTASSIUM mmol/L 4 0 3 2*   CHLORIDE mmol/L  --  103   CO2 mmol/L  --  30   BUN mg/dL  --  9   CREATININE mg/dL  --  0 61   CALCIUM mg/dL  --  9 5   ALK PHOS U/L  --  79   ALT U/L  --  21   AST U/L  --  22           Imaging: I have personally reviewed pertinent reports  No results found  EKG, Pathology, and Other Studies Reviewed on Admission:   EKG  Result Date: 2/28/2020  Impression:  Sinus rhythm with short OR at rate of 70 bpm, otherwise, normal ECG with QTc of 451 msec  Confirmed by Dr Dmitri Cannon  Thank you for the consult  We will continue to follow the patient  Please contact "[facility]- Admissions & Consults [facility]" via App [employer] with any further questions  We are available 24/7      Abundio Malone MD  02/29/20  2:31 PM

## 2020-02-29 NOTE — PROGRESS NOTES
Progress Note - Behavioral Health   Katt Ho 42 y o  female MRN: [id number]  Unit/Bed#: [facility] 345-02 Encounter: [id number]    Assessment/Plan   Principal Problem:    Bipolar I disorder, most recent episode manic, severe with psychotic features ([facility] [address] )  Active Problems:    Mild intermittent asthma without complication    Cannabis abuse, in remission    Medical clearance for psychiatric admission    Hypokalemia      "I've got varicose veins, do I talk to you about that? Wait are you the doctor or the psychiatrist?"  Pt was odd on exam, often apologizing for asking questions, at times disorganized, but generally coherent  She described her mood as "light as a feather"  She denies any depression, +anxiety "bc i'm missing my engagement ring you see??"  She says she slept 8 hours, appetite is good, and she says she is taking meds  She denies any SI/HI/AVH  She says she is getting along with her peers        Throughout the day she will comment things to me such as "I see you're still on your feet!"    Behavior over the last 24 hours:  unchanged  Sleep: normal  Appetite: normal  Medication side effects: No  ROS: no complaints    Mental Status Evaluation:  Appearance:  age appropriate and casually dressed   Behavior:  normal   Speech:  normal pitch, normal volume and fast at times   Mood:  anxious and anxious "bc i'm not wearing my engagement ring!"   Affect:  increased in range   Thought Process:  circumstantial   Thought Content:  normal   Perceptual Disturbances: None   Risk Potential: Suicidal Ideations none, Homicidal Ideations none and Potential for Aggression No   Sensorium:  person and place   Cognition:  grossly intact   Consciousness:  alert and awake    Attention: attention span appeared shorter than expected for age   Insight:  limited   Judgment: limited   Gait/Station: normal gait/station   Motor Activity: no abnormal movements     Progress Toward Goals: pt continues to be odd, hyperverbal at times with elevated affect    Recommended Treatment: Continue with group therapy, milieu therapy and occupational therapy  Risks, benefits and possible side effects of Medications:   Risks, benefits, and possible side effects of medications explained to patient and patient verbalizes understanding        Medications:   all current active meds have been reviewed, continue current psychiatric medications and current meds:   Current Facility-Administered Medications   Medication Dose Route Frequency    acetaminophen (TYLENOL) tablet 650 mg  650 mg Oral Q6H PRN    acetaminophen (TYLENOL) tablet 650 mg  650 mg Oral Q4H PRN    acetaminophen (TYLENOL) tablet 975 mg  975 mg Oral Q6H PRN    albuterol (PROVENTIL HFA,VENTOLIN HFA) inhaler 2 puff  2 puff Inhalation Q6H PRN    aluminum-magnesium hydroxide-simethicone (MYLANTA) 200-200-20 mg/5 mL oral suspension 15 mL  15 mL Oral Q4H PRN    benztropine (COGENTIN) injection 1 mg  1 mg Intramuscular Q6H PRN    benztropine (COGENTIN) tablet 1 mg  1 mg Oral Q6H PRN    divalproex sodium (DEPAKOTE) EC tablet 500 mg  500 mg Oral BID    haloperidol (HALDOL) tablet 5 mg  5 mg Oral Q6H PRN    haloperidol lactate (HALDOL) injection 5 mg  5 mg Intramuscular Q6H PRN    hydrOXYzine HCL (ATARAX) tablet 25 mg  25 mg Oral Q4H PRN    hydrOXYzine HCL (ATARAX) tablet 25 mg  25 mg Oral Q6H PRN    hydrOXYzine HCL (ATARAX) tablet 50 mg  50 mg Oral Q6H PRN    hydrOXYzine HCL (ATARAX) tablet 75 mg  75 mg Oral Q6H PRN    influenza vaccine, quadrivalent (FLUARIX) IM injection 0 5 mL  0 5 mL Intramuscular Once    LORazepam (ATIVAN) injection 1 mg  1 mg Intramuscular Q6H PRN    magnesium hydroxide (MILK OF MAGNESIA) 400 mg/5 mL oral suspension 30 mL  30 mL Oral Daily PRN    OLANZapine (ZyPREXA) IM injection 10 mg  10 mg Intramuscular Q3H PRN    OLANZapine (ZyPREXA) tablet 5 mg  5 mg Oral Q3H PRN    risperiDONE (RisperDAL M-TABS) dispersible tablet 1 mg  1 mg Oral Q4H PRN    risperiDONE (RisperDAL M-TABS) dispersible tablet 2 mg  2 mg Oral BID    traZODone (DESYREL) tablet 50 mg  50 mg Oral HS PRN     Labs: reviewed    Counseling / Coordination of Care  Total floor / unit time spent today 30 minutes  Greater than 50% of total time was spent with the patient and / or family counseling and / or coordination of care   A description of the counseling / coordination of care: Supportive therapy

## 2020-03-01 PROCEDURE — 99232 SBSQ HOSP IP/OBS MODERATE 35: CPT | Performed by: PSYCHIATRY & NEUROLOGY

## 2020-03-01 RX ADMIN — RISPERIDONE 2 MG: 1 TABLET, ORALLY DISINTEGRATING ORAL at 08:11

## 2020-03-01 RX ADMIN — DIVALPROEX SODIUM 500 MG: 500 TABLET, DELAYED RELEASE ORAL at 17:16

## 2020-03-01 RX ADMIN — RISPERIDONE 2 MG: 1 TABLET, ORALLY DISINTEGRATING ORAL at 17:16

## 2020-03-01 RX ADMIN — ACETAMINOPHEN 975 MG: 325 TABLET ORAL at 15:14

## 2020-03-01 RX ADMIN — ALBUTEROL SULFATE 2 PUFF: 90 AEROSOL, METERED RESPIRATORY (INHALATION) at 08:10

## 2020-03-01 RX ADMIN — DIVALPROEX SODIUM 500 MG: 500 TABLET, DELAYED RELEASE ORAL at 08:12

## 2020-03-01 NOTE — PROGRESS NOTES
Pt about unit and social with peers  Pleasant and cheerful on approach, pressured  C/o headache from blurry vision, "I see three of you " Denies all psychiatric symptoms  Denies leg pain  Will continue to monitor

## 2020-03-01 NOTE — PLAN OF CARE
Problem: Alteration in Thoughts and Perception  Goal: Agree to be compliant with medication regime, as prescribed and report medication side effects  Description  Interventions:  - Offer appropriate PRN medication and supervise ingestion; conduct AIMS, as needed   Outcome: Progressing  Goal: Recognize dysfunctional thoughts, communicate reality-based thoughts at the time of discharge  Description  Interventions:  - Provide medication and psycho-education to assist patient in compliance and developing insight into his/her illness   Outcome: Progressing     Problem: INVOLUNTARY ADMIT  Goal: Will cooperate with staff recommendations and doctor's orders and will demonstrate appropriate behavior  Description  INTERVENTIONS:  - Treat underlying conditions and offer medication as ordered  - Educate regarding involuntary admission procedures and rules  - Utilize positive consistent limit setting strategies to support patient and staff safety  Outcome: Progressing     Problem: PSYCHOSIS  Goal: Will report no hallucinations or delusions  Description  Interventions:  - Administer medication as  ordered  - Every waking shifts and PRN assess for the presence of hallucinations and or delusions  - Assist with reality testing to support increasing orientation  - Assess if patient's hallucinations or delusions are encouraging self-harm or harm to others and intervene as appropriate  Outcome: Progressing

## 2020-03-01 NOTE — PROGRESS NOTES
Patient was bright in conversation this morning  Reported no pain from her varicose veins and sleeping extremely well  Denied other symptoms and needs

## 2020-03-01 NOTE — PROGRESS NOTES
Patient reported to RN that her vision has been blurry and she has had an ocular discharge  No abnormalities are visible and patient reports no pain   RN will add to the medical list

## 2020-03-01 NOTE — PROGRESS NOTES
Progress Note - Behavioral Health   Mittie Stage 42 y o  female MRN: [id number]  Unit/Bed#: [facility] 345-02 Encounter: [id number]    Assessment/Plan   Principal Problem:    Bipolar I disorder, most recent episode manic, severe with psychotic features ([address] )  Active Problems:    Mild intermittent asthma without complication    Cannabis abuse, in remission    Medical clearance for psychiatric admission    Hypokalemia    Prediabetes    Pain in both lower extremities      "I woke up at 6 am   I went to bed at 9 pm, I freshened up, I cleaned up my room and here we are!"  Pt says she is eating and taking her meds  Mood is "my menstrual period has stopped, I have  No cramps, no pain, no more irritable feeling!  i'm wearing a light pad so I don't embarrass myself you know?"  She denies any depression/anxiety  No SI/Hi/AVH  "And doctor, my varicose veins have gone down, i'm much better today!"    Behavior over the last 24 hours:  unchanged  Sleep: normal  Appetite: normal  Medication side effects: No  ROS: no complaints    Mental Status Evaluation:  Appearance:  age appropriate, casually dressed and disheveled   Behavior:  normal   Speech:  pressured   Mood:  normal   Affect:  increased in intensity   Thought Process:  circumstantial   Thought Content:  normal   Perceptual Disturbances: None   Risk Potential: Suicidal Ideations none, Homicidal Ideations none and Potential for Aggression No   Sensorium:  person and place   Cognition:  grossly intact   Consciousness:  alert and awake    Attention: attention span and concentration were shorter than expected   Insight:  limited   Judgment: limited   Gait/Station: normal gait/station   Motor Activity: no abnormal movements     Progress Toward Goals: pt continues to appear elevated and odd    Recommended Treatment: Continue with group therapy, milieu therapy and occupational therapy        Risks, benefits and possible side effects of Medications:   Risks, benefits, and possible side effects of medications explained to patient and patient verbalizes understanding  Medications:   all current active meds have been reviewed, continue current psychiatric medications and current meds:   Current Facility-Administered Medications   Medication Dose Route Frequency    acetaminophen (TYLENOL) tablet 650 mg  650 mg Oral Q6H PRN    acetaminophen (TYLENOL) tablet 650 mg  650 mg Oral Q4H PRN    acetaminophen (TYLENOL) tablet 975 mg  975 mg Oral Q6H PRN    albuterol (PROVENTIL HFA,VENTOLIN HFA) inhaler 2 puff  2 puff Inhalation Q6H PRN    aluminum-magnesium hydroxide-simethicone (MYLANTA) 200-200-20 mg/5 mL oral suspension 15 mL  15 mL Oral Q4H PRN    benztropine (COGENTIN) injection 1 mg  1 mg Intramuscular Q6H PRN    benztropine (COGENTIN) tablet 1 mg  1 mg Oral Q6H PRN    divalproex sodium (DEPAKOTE) EC tablet 500 mg  500 mg Oral BID    haloperidol (HALDOL) tablet 5 mg  5 mg Oral Q6H PRN    haloperidol lactate (HALDOL) injection 5 mg  5 mg Intramuscular Q6H PRN    hydrOXYzine HCL (ATARAX) tablet 25 mg  25 mg Oral Q4H PRN    hydrOXYzine HCL (ATARAX) tablet 25 mg  25 mg Oral Q6H PRN    hydrOXYzine HCL (ATARAX) tablet 50 mg  50 mg Oral Q6H PRN    hydrOXYzine HCL (ATARAX) tablet 75 mg  75 mg Oral Q6H PRN    influenza vaccine, quadrivalent (FLUARIX) IM injection 0 5 mL  0 5 mL Intramuscular Once    LORazepam (ATIVAN) injection 1 mg  1 mg Intramuscular Q6H PRN    magnesium hydroxide (MILK OF MAGNESIA) 400 mg/5 mL oral suspension 30 mL  30 mL Oral Daily PRN    OLANZapine (ZyPREXA) IM injection 10 mg  10 mg Intramuscular Q3H PRN    OLANZapine (ZyPREXA) tablet 5 mg  5 mg Oral Q3H PRN    risperiDONE (RisperDAL M-TABS) dispersible tablet 1 mg  1 mg Oral Q4H PRN    risperiDONE (RisperDAL M-TABS) dispersible tablet 2 mg  2 mg Oral BID    traZODone (DESYREL) tablet 50 mg  50 mg Oral HS PRN         Labs: reviewed    Counseling / Coordination of Care  Total floor / unit time spent today 25 minutes  Greater than 50% of total time was spent with the patient and / or family counseling and / or coordination of care   A description of the counseling / coordination of care: supportive therapy

## 2020-03-02 ENCOUNTER — TELEPHONE (OUTPATIENT)
Dept: FAMILY MEDICINE CLINIC | Facility: CLINIC | Age: 40
End: 2020-03-02

## 2020-03-02 ENCOUNTER — APPOINTMENT (INPATIENT)
Dept: NON INVASIVE DIAGNOSTICS | Facility: HOSPITAL | Age: 40
DRG: 885 | End: 2020-03-02
Payer: COMMERCIAL

## 2020-03-02 LAB
ALBUMIN SERPL BCP-MCNC: 3.9 G/DL (ref 3–5.2)
ALP SERPL-CCNC: 56 U/L (ref 43–122)
ALT SERPL W P-5'-P-CCNC: 21 U/L (ref 9–52)
ANION GAP SERPL CALCULATED.3IONS-SCNC: 6 MMOL/L (ref 5–14)
AST SERPL W P-5'-P-CCNC: 17 U/L (ref 14–36)
BASOPHILS # BLD AUTO: 0 THOUSANDS/ΜL (ref 0–0.1)
BASOPHILS NFR BLD AUTO: 1 % (ref 0–1)
BILIRUB SERPL-MCNC: 0.4 MG/DL
BUN SERPL-MCNC: 6 MG/DL (ref 5–25)
CALCIUM SERPL-MCNC: 8.8 MG/DL (ref 8.4–10.2)
CHLORIDE SERPL-SCNC: 104 MMOL/L (ref 97–108)
CO2 SERPL-SCNC: 30 MMOL/L (ref 22–30)
CREAT SERPL-MCNC: 0.55 MG/DL (ref 0.6–1.2)
D DIMER PPP FEU-MCNC: 0.59 UG/ML FEU
EOSINOPHIL # BLD AUTO: 0.2 THOUSAND/ΜL (ref 0–0.4)
EOSINOPHIL NFR BLD AUTO: 4 % (ref 0–6)
ERYTHROCYTE [DISTWIDTH] IN BLOOD BY AUTOMATED COUNT: 14.3 %
GFR SERPL CREATININE-BSD FRML MDRD: 139 ML/MIN/1.73SQ M
GLUCOSE P FAST SERPL-MCNC: 104 MG/DL (ref 70–99)
GLUCOSE SERPL-MCNC: 104 MG/DL (ref 70–99)
HCT VFR BLD AUTO: 42 % (ref 36–46)
HGB BLD-MCNC: 13.7 G/DL (ref 12–16)
LYMPHOCYTES # BLD AUTO: 2.7 THOUSANDS/ΜL (ref 0.5–4)
LYMPHOCYTES NFR BLD AUTO: 48 % (ref 25–45)
MCH RBC QN AUTO: 31 PG (ref 26–34)
MCHC RBC AUTO-ENTMCNC: 32.7 G/DL (ref 31–36)
MCV RBC AUTO: 95 FL (ref 80–100)
MONOCYTES # BLD AUTO: 0.3 THOUSAND/ΜL (ref 0.2–0.9)
MONOCYTES NFR BLD AUTO: 5 % (ref 1–10)
NEUTROPHILS # BLD AUTO: 2.4 THOUSANDS/ΜL (ref 1.8–7.8)
NEUTS SEG NFR BLD AUTO: 43 % (ref 45–65)
PLATELET # BLD AUTO: 308 THOUSANDS/UL (ref 150–450)
PMV BLD AUTO: 9.5 FL (ref 8.9–12.7)
POTASSIUM SERPL-SCNC: 4 MMOL/L (ref 3.6–5)
PROT SERPL-MCNC: 7.3 G/DL (ref 5.9–8.4)
RBC # BLD AUTO: 4.42 MILLION/UL (ref 4–5.2)
SODIUM SERPL-SCNC: 140 MMOL/L (ref 137–147)
VALPROATE SERPL-MCNC: 71.9 UG/ML (ref 50–120)
WBC # BLD AUTO: 5.7 THOUSAND/UL (ref 4.5–11)

## 2020-03-02 PROCEDURE — 85025 COMPLETE CBC W/AUTO DIFF WBC: CPT | Performed by: PSYCHIATRY & NEUROLOGY

## 2020-03-02 PROCEDURE — 93971 EXTREMITY STUDY: CPT | Performed by: SURGERY

## 2020-03-02 PROCEDURE — 80164 ASSAY DIPROPYLACETIC ACD TOT: CPT | Performed by: PSYCHIATRY & NEUROLOGY

## 2020-03-02 PROCEDURE — 93971 EXTREMITY STUDY: CPT

## 2020-03-02 PROCEDURE — 99232 SBSQ HOSP IP/OBS MODERATE 35: CPT | Performed by: PSYCHIATRY & NEUROLOGY

## 2020-03-02 PROCEDURE — 80053 COMPREHEN METABOLIC PANEL: CPT | Performed by: PSYCHIATRY & NEUROLOGY

## 2020-03-02 PROCEDURE — 85379 FIBRIN DEGRADATION QUANT: CPT | Performed by: FAMILY MEDICINE

## 2020-03-02 RX ADMIN — DIVALPROEX SODIUM 500 MG: 500 TABLET, DELAYED RELEASE ORAL at 09:04

## 2020-03-02 RX ADMIN — DIVALPROEX SODIUM 500 MG: 500 TABLET, DELAYED RELEASE ORAL at 17:24

## 2020-03-02 RX ADMIN — ACETAMINOPHEN 975 MG: 325 TABLET ORAL at 21:16

## 2020-03-02 RX ADMIN — HYDROXYZINE HYDROCHLORIDE 50 MG: 50 TABLET, FILM COATED ORAL at 11:58

## 2020-03-02 RX ADMIN — NICOTINE POLACRILEX 2 MG: 2 GUM, CHEWING BUCCAL at 20:03

## 2020-03-02 RX ADMIN — ALBUTEROL SULFATE 2 PUFF: 90 AEROSOL, METERED RESPIRATORY (INHALATION) at 06:14

## 2020-03-02 RX ADMIN — NICOTINE POLACRILEX 2 MG: 2 GUM, CHEWING BUCCAL at 11:59

## 2020-03-02 RX ADMIN — RISPERIDONE 2 MG: 1 TABLET, ORALLY DISINTEGRATING ORAL at 09:04

## 2020-03-02 RX ADMIN — ACETAMINOPHEN 650 MG: 325 TABLET ORAL at 09:27

## 2020-03-02 RX ADMIN — RISPERIDONE 2 MG: 1 TABLET, ORALLY DISINTEGRATING ORAL at 17:24

## 2020-03-02 RX ADMIN — NICOTINE POLACRILEX 2 MG: 2 GUM, CHEWING BUCCAL at 17:45

## 2020-03-02 NOTE — PROGRESS NOTES
Progress Note - RUDDY/Mary 10 42 y o  female MRN: [id number]   Unit/Bed#: [facility] 345-02 Encounter: [id number]    Behavior over the last 24 hours: marylu Orta is doing better, seems less labile, has more organized thinking process  She is also less grandiose and her speech is less pressured  Has been taking medications  Attends some groups      Sleep: slept better  Appetite: normal  Medication side effects: No   ROS: no complaints, denies any shortness of breath, chest pain or abdominal pain, all other systems are negative    Mental Status Evaluation:    Appearance:  casually dressed   Behavior:  cooperative   Speech:  less tangential, less pressured   Mood:  less labile, not as manic   Affect:  less labile   Thought Process:  less disorganized   Associations: tangential associations   Thought Content:  less grandiose, less paranoid   Perceptual Disturbances: denies auditory or visual hallucinations when asked, but appears preoccupied   Risk Potential: Suicidal ideation - None  Homicidal ideation - None  Potential for aggression - No   Sensorium:  oriented to person, place and time/date   Memory:  recent and remote memory grossly intact   Consciousness:  alert and awake   Attention: decreased concentration and decreased attention span   Insight:  impaired   Judgment: impaired   Gait/Station: normal gait/station, normal balance   Motor Activity: no abnormal movements     Vital signs in last 24 hours:    Temp:  [97 4 °F (36 3 °C)-98 3 °F (36 8 °C)] 98 3 °F (36 8 °C)  HR:  [69-87] 87  Resp:  [16] 16  BP: (134-135)/(74-77) 135/77    Laboratory results: I have personally reviewed all pertinent laboratory/tests results    Last Laboratory Results with Depakote and/or Tegretol levels:   Lab Results   Component Value Date    WBC 5 70 03/02/2020    RBC 4 42 03/02/2020    HGB 13 7 03/02/2020    HCT 42 0 03/02/2020     03/02/2020    RDW 14 3 03/02/2020    NEUTROABS 2 40 03/02/2020    SODIUM 140 03/02/2020    K 4 0 03/02/2020     03/02/2020    CO2 30 03/02/2020    BUN 6 03/02/2020    CREATININE 0 55 (L) 03/02/2020    GLUC 104 (H) 03/02/2020    GLUF 104 (H) 03/02/2020    CALCIUM 8 8 03/02/2020    AST 17 03/02/2020    ALT 21 03/02/2020    ALKPHOS 56 03/02/2020    TP 7 3 03/02/2020    ALB 3 9 03/02/2020    TBILI 0 40 03/02/2020    VALPROICTOT 71 9 03/02/2020     Suicide/Homicide Risk Assessment:    Risk of Harm to Self:   Nursing Suicide Risk Assessment Last 24 hours: Low Risk to Self: N/A; Moderate Risk to Self: Increasing use of alcohol or other substances  , Presence of delusions  ;    Current Specific Risk Factors include: diagnosis of mood disorder, current unstable mood, current psychotic symptoms  Protective Factors: no current suicidal ideation, ability to communicate with staff on the unit, taking medications as ordered on the unit  Based on today's assessment, Iron Portillo presents the following risk of harm to self: moderate    Risk of Harm to Others:  Nursing Homicide Risk Assessment: Violence Risk to Others: Denies within past 6 months  Based on today's assessment, Iron Portillo presents the following risk of harm to others: none    The following interventions are recommended: behavioral checks every 7 minutes, continued hospitalization on locked unit    Progress Toward Goals: progressing, not as disorganized, less labile, less paranoid, less grandiose, working on coping skills, insight remains poor    Assessment/Plan   Principal Problem:    Bipolar I disorder, most recent episode manic, severe with psychotic features ([facility] [address] )  Active Problems:    Cannabis abuse, in remission    Mild intermittent asthma without complication    Medical clearance for psychiatric admission    Hypokalemia    Prediabetes    Pain in both lower extremities    Recommended Treatment:     Planned medication and treatment changes:     All current active medications have been reviewed  Encourage group therapy, milieu therapy and occupational therapy  Behavioral Health checks every 7 minutes  303 hearing scheduled for tomorrow  Recheck Depakote level, CBC/diff and CMP in 2 days     Continue current medications:    Current Facility-Administered Medications:  acetaminophen 650 mg Oral Q6H PRN Airam Chang MD   acetaminophen 650 mg Oral Q4H PRN Airam Chang MD   acetaminophen 975 mg Oral Q6H PRN Airam Chang MD   albuterol 2 puff Inhalation Q6H PRN Airam Chang MD   aluminum-magnesium hydroxide-simethicone 15 mL Oral Q4H PRN Airam Chang MD   benztropine 1 mg Intramuscular Q6H PRN Airam Chang MD   benztropine 1 mg Oral Q6H PRN Airam Chang MD   divalproex sodium 500 mg Oral BID Airam Chang MD   haloperidol 5 mg Oral Q6H PRN Airam Chang MD   haloperidol lactate 5 mg Intramuscular Q6H PRN Airam Chang MD   hydrOXYzine HCL 25 mg Oral Q4H PRN Jim Subramanian,    hydrOXYzine HCL 25 mg Oral Q6H PRN Airam Chang MD   hydrOXYzine HCL 50 mg Oral Q6H PRN Airam Chang MD   hydrOXYzine HCL 75 mg Oral Q6H PRN Airam Chang MD   influenza vaccine 0 5 mL Intramuscular Once Shaila Dawn MD   LORazepam 1 mg Intramuscular Q6H PRN Airam Chang MD   magnesium hydroxide 30 mL Oral Daily PRN Airam Chang MD   OLANZapine 10 mg Intramuscular Q3H PRN Airam Chang MD   OLANZapine 5 mg Oral Q3H PRN Derral Moris, DO   risperiDONE 1 mg Oral Q4H PRN Airam Chang MD   risperiDONE 2 mg Oral BID Airam Chang MD   traZODone 50 mg Oral HS PRN Derral Moris, DO       Risks / Benefits of Treatment:    Risks, benefits, and possible side effects of medications explained to patient  Patient has limited understanding of risks and benefits of treatment at this time, but agrees to take medications as prescribed  Risks of medications in pregnancy explained if female patient  Patient verbalizes understanding and agrees to notify her doctor if she becomes pregnant      Counseling / Coordination of Care:    Patient's progress discussed with staff in treatment team meeting  Medications, treatment progress and treatment plan reviewed with patient  Medication education provided to patient      Danis Sanders MD 03/02/20

## 2020-03-02 NOTE — NURSING NOTE
Patient is alert and orientated this shift  Able to make needs known  Patient presents as pressured and labile  Multiple somatic complaints made this shift  Complaints of cramps and tightness of chest   Patient states she is having difficulty breathing, but is presenting as manic, constantly ambulating in the garrett  Interacting well with peers  Patient denies any suicidal or homicidal ideation  Patient denies any auditory or visual hallucinations  Will continue to monitor

## 2020-03-02 NOTE — PROGRESS NOTES
Pt presents as visible in public areas as well as bedroom  Currently denies SI, HI and A/V hallucinations  Compliant with meds  Observed as bright, mildly manic  and cooperative  Content is euphoric, loosely associated but positive  Pt appears mildly confused which is unchanged since point of admission

## 2020-03-02 NOTE — CASE MANAGEMENT
SW spoke with Enriqueta Ram, pt  to let him know about hearing  Lolis Parekh has concerns with Pt taking medications  Pt was taking injectable in September of last year  As long as she starts feeling better she will stop medications  Lolis Parekh will like to come in and meet with team before Pt is discharged  Lolis Parekh would like in home services before Pt is discharged     ICM or Act referral would be best

## 2020-03-02 NOTE — TREATMENT TEAM
03/02/20 0700   Team Meeting   Meeting Type Daily Rounds   Team Members Present   Team Members Present Physician;Nurse;;; Other (Discipline and Name)   Physician Team Member [address] [facility] Team Member Chapin Neil   Social Work Team Member     Other (Discipline and Name) MARLYN Lozoya, students   Patient/Family Present   Patient Present No   Patient's Family Present No     Place on medicine list for elevated D  Dimer  Med Management

## 2020-03-02 NOTE — TREATMENT TEAM
PINA GROUP NOTE  Rd  Arrived at group a few minutes late and reported that this may have been the reason that she did not feel relaxed  Full, Active Participation        03/02/20 1100   Activity/Group Checklist   Group Personal control  (Sachin Chi)   Attendance Attended   Attendance Duration (min) 16-30   Interactions Interacted appropriately   Affect/Mood Appropriate

## 2020-03-03 ENCOUNTER — TELEPHONE (OUTPATIENT)
Dept: FAMILY MEDICINE CLINIC | Facility: CLINIC | Age: 40
End: 2020-03-03

## 2020-03-03 PROCEDURE — 99232 SBSQ HOSP IP/OBS MODERATE 35: CPT | Performed by: PSYCHIATRY & NEUROLOGY

## 2020-03-03 RX ADMIN — DIVALPROEX SODIUM 500 MG: 500 TABLET, DELAYED RELEASE ORAL at 17:02

## 2020-03-03 RX ADMIN — NICOTINE POLACRILEX 2 MG: 2 GUM, CHEWING BUCCAL at 08:15

## 2020-03-03 RX ADMIN — NICOTINE POLACRILEX 2 MG: 2 GUM, CHEWING BUCCAL at 11:00

## 2020-03-03 RX ADMIN — NICOTINE POLACRILEX 2 MG: 2 GUM, CHEWING BUCCAL at 22:01

## 2020-03-03 RX ADMIN — ACETAMINOPHEN 975 MG: 325 TABLET ORAL at 19:23

## 2020-03-03 RX ADMIN — ACETAMINOPHEN 975 MG: 325 TABLET ORAL at 11:19

## 2020-03-03 RX ADMIN — DIVALPROEX SODIUM 500 MG: 500 TABLET, DELAYED RELEASE ORAL at 08:13

## 2020-03-03 RX ADMIN — RISPERIDONE 2 MG: 1 TABLET, ORALLY DISINTEGRATING ORAL at 17:02

## 2020-03-03 RX ADMIN — NICOTINE POLACRILEX 2 MG: 2 GUM, CHEWING BUCCAL at 15:48

## 2020-03-03 RX ADMIN — RISPERIDONE 2 MG: 1 TABLET, ORALLY DISINTEGRATING ORAL at 08:13

## 2020-03-03 NOTE — TREATMENT TEAM
PINA GROUP NOTE  Less hyperverbal with appropriate interaction  Participated in goal setting  Supportive to peers  03/03/20 0930   Activity/Group Checklist   Group Community meeting   Attendance Attended   Attendance Duration (min) 16-30   Interactions Interacted appropriately   Affect/Mood Appropriate   Goals Achieved Able to listen to others; Able to engage in interactions; Displayed empathy   Objectives/Key Points:  Living intentionally  Goal Setting  Thought for the Day  Self Check In

## 2020-03-03 NOTE — PLAN OF CARE
Problem: INVOLUNTARY ADMIT  Goal: Will cooperate with staff recommendations and doctor's orders and will demonstrate appropriate behavior  Description  INTERVENTIONS:  - Treat underlying conditions and offer medication as ordered  - Educate regarding involuntary admission procedures and rules  - Utilize positive consistent limit setting strategies to support patient and staff safety  Outcome: Progressing     Problem: DISCHARGE PLANNING  Goal: Discharge to home or other facility with appropriate resources  Description  INTERVENTIONS:  - Identify barriers to discharge w/patient and caregiver  - Arrange for needed discharge resources and transportation as appropriate  - Identify discharge learning needs (meds, wound care, etc )  - Arrange for interpretive services to assist at discharge as needed  - Refer to Case Management Department for coordinating discharge planning if the patient needs post-hospital services based on physician/advanced practitioner order or complex needs related to functional status, cognitive ability, or social support system  Outcome: Progressing     Problem: Ineffective Coping  Goal: Participates in unit activities  Description  Interventions:  - Provide therapeutic environment   - Provide required programming   - Redirect inappropriate behaviors   Outcome: Progressing     Problem: PSYCHOSIS  Goal: Will report no hallucinations or delusions  Description  Interventions:  - Administer medication as  ordered  - Every waking shifts and PRN assess for the presence of hallucinations and or delusions  - Assist with reality testing to support increasing orientation  - Assess if patient's hallucinations or delusions are encouraging self-harm or harm to others and intervene as appropriate  Outcome: Progressing     Problem: Alteration in Thoughts and Perception  Goal: Agree to be compliant with medication regime, as prescribed and report medication side effects  Description  Interventions:  - Offer appropriate PRN medication and supervise ingestion; conduct AIMS, as needed   Outcome: Progressing  Goal: Recognize dysfunctional thoughts, communicate reality-based thoughts at the time of discharge  Description  Interventions:  - Provide medication and psycho-education to assist patient in compliance and developing insight into his/her illness   Outcome: Progressing
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
## 2020-03-03 NOTE — CASE MANAGEMENT
303 hearing held with [facility], [place]  Pt was in agreement with recommendations and staying for treatment

## 2020-03-03 NOTE — TELEPHONE ENCOUNTER
----- Message from Dilip Harris MD sent at 3/2/2020  1:31 PM EST -----  Dear team,   Please get an appointment soon for this patient seen in the ED  Needs hormonal work-up  Prefer's morning appointments       Thank you  Harley

## 2020-03-03 NOTE — PROGRESS NOTES
Progress Note - RUDDYSantosTerrellia [age] y o  female MRN: [id number]   Unit/Bed#: [facility] 345-02 Encounter: [id number]    Behavior over the last 24 hours: limited improvement  Simeon Michaud is still bizarre and grandiose at times, still has manic symptoms "I feel amazing!"  Has been taking medications and is cooperative otherwise with milieu and assessments  Stipulated today for up to 20 days at 303 hearing  Attends groups  Sleep: slept off and on  Appetite: normal  Medication side effects: No   ROS: no complaints, denies any headache, shortness of breath or abdominal pain, all other systems are negative    Mental Status Evaluation:    Appearance:  casually dressed   Behavior:  cooperative   Speech:  tangential, still somewhat pressured   Mood:  labile, manic   Affect:  labile   Thought Process:  tangential   Associations: tangential associations   Thought Content:  paranoid ideation, grandiose ideas   Perceptual Disturbances: denies auditory or visual hallucinations when asked, but appears preoccupied   Risk Potential: Suicidal ideation - None  Homicidal ideation - None  Potential for aggression - No   Sensorium:  oriented to person, place and time/date   Memory:  recent and remote memory grossly intact   Consciousness:  alert and awake   Attention: poor concentration and poor attention span   Insight:  impaired   Judgment: impaired   Gait/Station: normal gait/station, normal balance   Motor Activity: no abnormal movements     Vital signs in last 24 hours:    Temp:  [97 8 °F (36 6 °C)-98 2 °F (36 8 °C)] 98 2 °F (36 8 °C)  HR:  [86-99] 99  Resp:  [16] 16  BP: (125-126)/(75) 125/75    Laboratory results: I have personally reviewed all pertinent laboratory/tests results      Suicide/Homicide Risk Assessment:    Risk of Harm to Self:   Nursing Suicide Risk Assessment Last 24 hours: Low Risk to Self: N/A; Moderate Risk to Self: Increasing use of alcohol or other substances  , Presence of delusions  ; Current Specific Risk Factors include: diagnosis of mood disorder, current unstable mood, current psychotic symptoms  Protective Factors: no current suicidal ideation, ability to communicate with staff on the unit, taking medications as ordered on the unit  Based on today's assessment, Ramon Osullivan presents the following risk of harm to self: moderate    Risk of Harm to Others:  Nursing Homicide Risk Assessment: Violence Risk to Others: Denies within past 6 months  Based on today's assessment, Ramon Osullivan presents the following risk of harm to others: none    The following interventions are recommended: behavioral checks every 7 minutes, continued hospitalization on locked unit    Progress Toward Goals: limited progress, still somewhat labile, still grandiose, poor reality testing    Assessment/Plan   Principal Problem:    Bipolar I disorder, most recent episode manic, severe with psychotic features ([facility] [address] )  Active Problems:    Cannabis abuse, in remission    Mild intermittent asthma without complication    Medical clearance for psychiatric admission    Hypokalemia    Prediabetes    Pain in both lower extremities    Recommended Treatment:     Planned medication and treatment changes:     All current active medications have been reviewed  Encourage group therapy, milieu therapy and occupational therapy  Behavioral Health checks every 7 minutes  On 303 commitment up to 20 days - hearing was held today  Recheck Depakote level, CBC/diff and CMP in the morning     Continue current medications:    Current Facility-Administered Medications:  acetaminophen 650 mg Oral Q6H PRN Meng Chinchilla MD   acetaminophen 650 mg Oral Q4H PRN Meng Chinchilla MD   acetaminophen 975 mg Oral Q6H PRN Meng Chinchilla MD   albuterol 2 puff Inhalation Q6H PRN Meng Chinchilla MD   aluminum-magnesium hydroxide-simethicone 15 mL Oral Q4H PRN Meng Chinchilla MD   benztropine 1 mg Intramuscular Q6H PRN Meng Chinchilla MD   benztropine 1 mg Oral Q6H PRN Kayleigh Majano MD   divalproex sodium 500 mg Oral BID Kayleigh Majano MD   haloperidol 5 mg Oral Q6H PRN Kayleigh Majano MD   haloperidol lactate 5 mg Intramuscular Q6H PRN Kayleigh Majano MD   hydrOXYzine HCL 25 mg Oral Q4H PRN Alexander Honor, DO   hydrOXYzine HCL 25 mg Oral Q6H PRN Kayleigh Majano MD   hydrOXYzine HCL 50 mg Oral Q6H PRN Kayleigh Majano MD   hydrOXYzine HCL 75 mg Oral Q6H PRN Kayleigh Majano MD   influenza vaccine 0 5 mL Intramuscular Once Dia Lund MD   LORazepam 1 mg Intramuscular Q6H PRN Kayleigh Majano MD   magnesium hydroxide 30 mL Oral Daily PRN Kayleigh Majano MD   nicotine polacrilex 2 mg Oral Q4H PRN Domonique JANETT Lozoya, CRNP   OLANZapine 10 mg Intramuscular Q3H PRN Kayleigh Majano MD   OLANZapine 5 mg Oral Q3H PRN Alexander Honor, DO   risperiDONE 1 mg Oral Q4H PRN Kayleigh Majano MD   risperiDONE 2 mg Oral BID Kayleigh Majano MD   traZODone 50 mg Oral HS PRN Alexander Honor, DO       Risks / Benefits of Treatment:    Risks, benefits, and possible side effects of medications explained to patient  Patient has limited understanding of risks and benefits of treatment at this time, but agrees to take medications as prescribed  Risks of medications in pregnancy explained if female patient  Patient verbalizes understanding and agrees to notify her doctor if she becomes pregnant  Counseling / Coordination of Care: Total floor / unit time spent today 35 minutes  Greater than 50% of total time was spent with the patient and / or family counseling and / or coordination of care  A description of counseling / coordination of care:    Patient's progress discussed with staff in treatment team meeting  Medications, treatment progress and treatment plan reviewed with patient  Medication education provided to patient  Importance of medication and treatment compliance reviewed with patient  Supportive therapy provided to patient    Reoriented to reality and reassured  I attended and testified at the [address] [place] [address] today, with patient present at the hearing  The patient was committed for further inpatient treatment at the [facility]      Daphnie Gupta MD 03/03/20

## 2020-03-03 NOTE — TREATMENT TEAM
03/03/20 0750   Team Meeting   Meeting Type Daily Rounds   Team Members Present   Team Members Present Physician;Nurse;; Other (Discipline and Name)   Physician Team Member [address] Team Member [address] [place] 1936 Management Team Member [address] and harms   Other (Discipline and Name) [facility] NP, Lee Topete and medical studnet   Patient/Family Present   Patient Present No   Patient's Family Present No     Medication monitoring/management
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
## 2020-03-03 NOTE — PROGRESS NOTES
About the unit  Thoughts appear disorganized  Speech pressured  Has been compliant with all medications  Patient initially said that her eye was blurry when she read but she couldn't make out which eye it was  Patient reported to RN that she has never been to an eye dr   Patient then approached RN a couple minutes later saying that her eye felt better and was no longer blurry  Has not had any further complaints since  Will monitor

## 2020-03-03 NOTE — TREATMENT TEAM
PINA GROUP NOTE     03/03/20 1100   Activity/Group Checklist   Group Personal control  (Autobiography in 5 chapters)   Attendance Attended   Attendance Duration (min) 31-45   Interactions Interacted appropriately   Affect/Mood Appropriate   Goals Achieved Able to listen to others; Able to engage in interactions; Identified feelings; Identified triggers; Discussed self-esteem issues; Able to self-disclose; Able to recieve feedback; Able to manage/cope with feelings

## 2020-03-04 LAB
ALBUMIN SERPL BCP-MCNC: 3.7 G/DL (ref 3–5.2)
ALP SERPL-CCNC: 61 U/L (ref 43–122)
ALT SERPL W P-5'-P-CCNC: 23 U/L (ref 9–52)
ANION GAP SERPL CALCULATED.3IONS-SCNC: 6 MMOL/L (ref 5–14)
AST SERPL W P-5'-P-CCNC: 16 U/L (ref 14–36)
BASOPHILS # BLD AUTO: 0.1 THOUSANDS/ΜL (ref 0–0.1)
BASOPHILS NFR BLD AUTO: 1 % (ref 0–1)
BILIRUB SERPL-MCNC: 0.2 MG/DL
BUN SERPL-MCNC: 10 MG/DL (ref 5–25)
CALCIUM SERPL-MCNC: 8.7 MG/DL (ref 8.4–10.2)
CHLORIDE SERPL-SCNC: 104 MMOL/L (ref 97–108)
CO2 SERPL-SCNC: 29 MMOL/L (ref 22–30)
CREAT SERPL-MCNC: 0.49 MG/DL (ref 0.6–1.2)
EOSINOPHIL # BLD AUTO: 0.3 THOUSAND/ΜL (ref 0–0.4)
EOSINOPHIL NFR BLD AUTO: 5 % (ref 0–6)
ERYTHROCYTE [DISTWIDTH] IN BLOOD BY AUTOMATED COUNT: 13.9 %
GFR SERPL CREATININE-BSD FRML MDRD: 144 ML/MIN/1.73SQ M
GLUCOSE P FAST SERPL-MCNC: 93 MG/DL (ref 70–99)
GLUCOSE SERPL-MCNC: 93 MG/DL (ref 70–99)
HCT VFR BLD AUTO: 39.5 % (ref 36–46)
HGB BLD-MCNC: 13 G/DL (ref 12–16)
LYMPHOCYTES # BLD AUTO: 3.2 THOUSANDS/ΜL (ref 0.5–4)
LYMPHOCYTES NFR BLD AUTO: 46 % (ref 25–45)
MCH RBC QN AUTO: 31.7 PG (ref 26–34)
MCHC RBC AUTO-ENTMCNC: 33.1 G/DL (ref 31–36)
MCV RBC AUTO: 96 FL (ref 80–100)
MONOCYTES # BLD AUTO: 0.3 THOUSAND/ΜL (ref 0.2–0.9)
MONOCYTES NFR BLD AUTO: 5 % (ref 1–10)
NEUTROPHILS # BLD AUTO: 2.9 THOUSANDS/ΜL (ref 1.8–7.8)
NEUTS SEG NFR BLD AUTO: 43 % (ref 45–65)
PLATELET # BLD AUTO: 262 THOUSANDS/UL (ref 150–450)
PMV BLD AUTO: 9.6 FL (ref 8.9–12.7)
POTASSIUM SERPL-SCNC: 4 MMOL/L (ref 3.6–5)
PROT SERPL-MCNC: 6.8 G/DL (ref 5.9–8.4)
RBC # BLD AUTO: 4.12 MILLION/UL (ref 4–5.2)
SODIUM SERPL-SCNC: 139 MMOL/L (ref 137–147)
VALPROATE SERPL-MCNC: 77.6 UG/ML (ref 50–120)
WBC # BLD AUTO: 6.9 THOUSAND/UL (ref 4.5–11)

## 2020-03-04 PROCEDURE — 99233 SBSQ HOSP IP/OBS HIGH 50: CPT | Performed by: PSYCHIATRY & NEUROLOGY

## 2020-03-04 PROCEDURE — 80053 COMPREHEN METABOLIC PANEL: CPT | Performed by: PSYCHIATRY & NEUROLOGY

## 2020-03-04 PROCEDURE — 85025 COMPLETE CBC W/AUTO DIFF WBC: CPT | Performed by: PSYCHIATRY & NEUROLOGY

## 2020-03-04 PROCEDURE — 80164 ASSAY DIPROPYLACETIC ACD TOT: CPT | Performed by: PSYCHIATRY & NEUROLOGY

## 2020-03-04 RX ORDER — DIVALPROEX SODIUM 500 MG/1
500 TABLET, DELAYED RELEASE ORAL DAILY
Status: DISCONTINUED | OUTPATIENT
Start: 2020-03-05 | End: 2020-03-09

## 2020-03-04 RX ADMIN — RISPERIDONE 2 MG: 1 TABLET, ORALLY DISINTEGRATING ORAL at 17:12

## 2020-03-04 RX ADMIN — ACETAMINOPHEN 975 MG: 325 TABLET ORAL at 16:12

## 2020-03-04 RX ADMIN — DIVALPROEX SODIUM 500 MG: 500 TABLET, DELAYED RELEASE ORAL at 08:16

## 2020-03-04 RX ADMIN — NICOTINE POLACRILEX 2 MG: 2 GUM, CHEWING BUCCAL at 18:56

## 2020-03-04 RX ADMIN — DIVALPROEX SODIUM 750 MG: 250 TABLET, DELAYED RELEASE ORAL at 17:11

## 2020-03-04 RX ADMIN — RISPERIDONE 2 MG: 1 TABLET, ORALLY DISINTEGRATING ORAL at 08:16

## 2020-03-04 RX ADMIN — NICOTINE POLACRILEX 2 MG: 2 GUM, CHEWING BUCCAL at 06:20

## 2020-03-04 RX ADMIN — NICOTINE POLACRILEX 2 MG: 2 GUM, CHEWING BUCCAL at 11:10

## 2020-03-04 RX ADMIN — NICOTINE POLACRILEX 2 MG: 2 GUM, CHEWING BUCCAL at 15:39

## 2020-03-04 NOTE — TREATMENT TEAM
03/04/20 1415   Activity/Group Checklist   Group Other (Comment)  (Art Therapy Group/Various Topics, Process Discussion)   Attendance Attended   Attendance Duration (min) Greater than 60   Interactions Interacted appropriately   Affect/Mood Appropriate   Goals Achieved Able to listen to others; Able to engage in interactions; Able to recieve feedback; Able to give feedback to another  (Able to engage materials; full participation/avoidant)     Patient is directly avoidant of stressors, however, artwork indirectly reflects trauma, internal pressure and anxiety surrounding the core self, which is identified with trauma

## 2020-03-04 NOTE — PROGRESS NOTES
Pt visible on unit  Remains labile, tangential, pressured  Dancing and singing while conversing with RN  Denies all symptoms and states she hopes for discharge soon  C/o abdominal cramps and accepted PRN tylenol  Compliant with routines and care  Will monitor

## 2020-03-04 NOTE — PROGRESS NOTES
Patient has been about the unit  Socializing with peers  Denies all symptoms  Pressured in speech; tangential    Compliant with medications  No paranoid thinking noted during interaction

## 2020-03-04 NOTE — TREATMENT TEAM
03/04/20 0752   Team Meeting   Meeting Type Daily Rounds   Team Members Present   Team Members Present Physician;Nurse;; Other (Discipline and Name)   Physician Team Member Pablo   Nursing Team Member Carlos   Care Management Team Member Sanjeev   Other (Discipline and Name) Ronel Rogers NP, medical student   Patient/Family Present   Patient Present No   Patient's Family Present No   Will increase depakote dose

## 2020-03-04 NOTE — PROGRESS NOTES
Progress Note - RUDDY/Mary 10 42 y o  female MRN: [id number]   Unit/Bed#: [facility] Encounter: [id number]    Behavior over the last 24 hours: minimal improvement  Cb Chavez remains labile and manic "I feel amazing!"  She still has disorganized thoughts and pressured speech; focusing on "past dictators" during the session  Compliant with medications  Attends group therapy  Socializes with peers      Sleep: slept better  Appetite: normal  Medication side effects: No   ROS: no complaints, denies any headache, shortness of breath or chest pain, all other systems are negative    Mental Status Evaluation:    Appearance:  casually dressed   Behavior:  cooperative   Speech:  pressured, tangential   Mood:  labile, manic   Affect:  labile   Thought Process:  tangential   Associations: tangential associations   Thought Content:  paranoid ideation, grandiose ideas   Perceptual Disturbances: no auditory hallucinations, no visual hallucinations   Risk Potential: Suicidal ideation - None  Homicidal ideation - None  Potential for aggression - No   Sensorium:  oriented to person, place and time/date   Memory:  recent and remote memory grossly intact   Consciousness:  alert and awake   Attention: decreased concentration and decreased attention span   Insight:  impaired   Judgment: impaired   Gait/Station: normal gait/station, normal balance   Motor Activity: no abnormal movements     Vital signs in last 24 hours:    Temp:  [97 9 °F (36 6 °C)-99 1 °F (37 3 °C)] 99 1 °F (37 3 °C)  HR:  [83-91] 91  Resp:  [16] 16  BP: (131-138)/(80-97) 131/97    Laboratory results: I have personally reviewed all pertinent laboratory/tests results    Last Laboratory Results with Depakote and/or Tegretol levels:   Lab Results   Component Value Date    WBC 6 90 03/04/2020    RBC 4 12 03/04/2020    HGB 13 0 03/04/2020    HCT 39 5 03/04/2020     03/04/2020    RDW 13 9 03/04/2020    NEUTROABS 2 90 03/04/2020    SODIUM 139 03/04/2020    K 4 0 03/04/2020     03/04/2020    CO2 29 03/04/2020    BUN 10 03/04/2020    CREATININE 0 49 (L) 03/04/2020    GLUC 93 03/04/2020    GLUF 93 03/04/2020    CALCIUM 8 7 03/04/2020    AST 16 03/04/2020    ALT 23 03/04/2020    ALKPHOS 61 03/04/2020    TP 6 8 03/04/2020    ALB 3 7 03/04/2020    TBILI 0 20 03/04/2020    VALPROICTOT 77 6 03/04/2020     Suicide/Homicide Risk Assessment:    Risk of Harm to Self:   Nursing Suicide Risk Assessment Last 24 hours: Low Risk to Self: No evidence of suicidal thoughts or history; Moderate Risk to Self: Presence of delusions  , Increasing use of alcohol or other substances  ;    Current Specific Risk Factors include: diagnosis of mood disorder, current unstable mood, current psychotic symptoms  Protective Factors: no current suicidal ideation, ability to communicate with staff on the unit, taking medications as ordered on the unit  Based on today's assessment, Dia Cui presents the following risk of harm to self: moderate    Risk of Harm to Others:  Nursing Homicide Risk Assessment: Violence Risk to Others: Denies within past 6 months  Based on today's assessment, Dia Cui presents the following risk of harm to others: none    The following interventions are recommended: behavioral checks every 7 minutes, continued hospitalization on locked unit    Progress Toward Goals: limited improvement, still labile, insight remains poor, still has manic symptoms    Assessment/Plan   Principal Problem:    Bipolar I disorder, most recent episode manic, severe with psychotic features ([facility] [address] )  Active Problems:    Cannabis abuse, in remission    Mild intermittent asthma without complication    Medical clearance for psychiatric admission    Hypokalemia    Prediabetes    Pain in both lower extremities    Recommended Treatment:     Planned medication and treatment changes:     All current active medications have been reviewed  Encourage group therapy, milieu therapy and occupational therapy  Behavioral Health checks every 7 minutes  On 303 commitment up to 20 days  Increase Depakote to 500 mg daily and 750 mg in the evening to help with mood stabilization  Recheck Depakote level, CBC/diff and CMP in 2 days    Continue all other medications:    Current Facility-Administered Medications:  acetaminophen 650 mg Oral Q6H PRN Robina Fay MD   acetaminophen 650 mg Oral Q4H PRN Robina Fay MD   acetaminophen 975 mg Oral Q6H PRN Robina Fay MD   albuterol 2 puff Inhalation Q6H PRN Robina Fay MD   aluminum-magnesium hydroxide-simethicone 15 mL Oral Q4H PRN Robina Fay MD   benztropine 1 mg Intramuscular Q6H PRN Robina Fay MD   benztropine 1 mg Oral Q6H PRN Robina Fay MD   [START ON 3/5/2020] divalproex sodium 500 mg Oral Daily Robina Fay MD   divalproex sodium 750 mg Oral QPM Robina Fay MD   haloperidol 5 mg Oral Q6H PRN Robina Fay MD   haloperidol lactate 5 mg Intramuscular Q6H PRN Robina Fay MD   hydrOXYzine HCL 25 mg Oral Q4H PRN Dilip Shirley, DO   hydrOXYzine HCL 25 mg Oral Q6H PRN Robina Fay MD   hydrOXYzine HCL 50 mg Oral Q6H PRN Robina Fay MD   hydrOXYzine HCL 75 mg Oral Q6H PRN Robina Fay MD   influenza vaccine 0 5 mL Intramuscular Once Abril Kohler MD   LORazepam 1 mg Intramuscular Q6H PRN Robina Fay MD   magnesium hydroxide 30 mL Oral Daily PRN Robina Fay MD   nicotine polacrilex 2 mg Oral Q2H PRN Robina Fay MD   OLANZapine 10 mg Intramuscular Q3H PRN Robina Fay MD   OLANZapine 5 mg Oral Q3H PRN Dilip Watson, DO   risperiDONE 1 mg Oral Q4H PRN Robina Fay MD   risperiDONE 2 mg Oral BID Robina Fay MD   traZODone 50 mg Oral HS PRN Diliprichar Watson, DO       Risks / Benefits of Treatment:    Risks, benefits, and possible side effects of medications explained to patient   Patient has limited understanding of risks and benefits of treatment at this time, but agrees to take medications as prescribed  Risks of medications in pregnancy explained if female patient  Patient verbalizes understanding and agrees to notify her doctor if she becomes pregnant  Counseling / Coordination of Care:    Patient's progress discussed with staff in treatment team meeting  Medications, treatment progress and treatment plan reviewed with patient  Medication changes discussed with patient      Ann Marie Nix MD 03/04/20

## 2020-03-04 NOTE — CASE MANAGEMENT
SW met with Pt to see how she was feeling  Pt began conversation with stating she will do whatever needs to be done  SW asked if she had received any outpatient services in [place] and Pt stated "no"  She stated her [employer] is no longer active and she does not want hospital using it or she will be charged with insurance fraud due to living in [place] the last 7 months  Pt stated she is not sure what the LESLEE, her children's father, or anyone else has told DANYA but she is willing to cooperate with medications and follow-up, she just wants to be discharged soon  Pt then asked SW for a high five after speaking  Pt was overly cooperative but did not appear sincere  She was confused during conversation and spoke to SW as if she were legally here for criminal reasons, not mental health

## 2020-03-05 PROCEDURE — 99232 SBSQ HOSP IP/OBS MODERATE 35: CPT | Performed by: PSYCHIATRY & NEUROLOGY

## 2020-03-05 RX ORDER — DIVALPROEX SODIUM 500 MG/1
TABLET, DELAYED RELEASE ORAL
Status: COMPLETED
Start: 2020-03-05 | End: 2020-03-05

## 2020-03-05 RX ORDER — DIVALPROEX SODIUM 500 MG/1
1000 TABLET, DELAYED RELEASE ORAL EVERY EVENING
Status: DISCONTINUED | OUTPATIENT
Start: 2020-03-05 | End: 2020-03-16 | Stop reason: HOSPADM

## 2020-03-05 RX ADMIN — ACETAMINOPHEN 650 MG: 325 TABLET ORAL at 06:20

## 2020-03-05 RX ADMIN — RISPERIDONE 2 MG: 1 TABLET, ORALLY DISINTEGRATING ORAL at 08:24

## 2020-03-05 RX ADMIN — NICOTINE POLACRILEX 2 MG: 2 GUM, CHEWING BUCCAL at 15:17

## 2020-03-05 RX ADMIN — RISPERIDONE 2 MG: 1 TABLET, ORALLY DISINTEGRATING ORAL at 18:49

## 2020-03-05 RX ADMIN — DIVALPROEX SODIUM 500 MG: 500 TABLET, DELAYED RELEASE ORAL at 08:24

## 2020-03-05 RX ADMIN — NICOTINE POLACRILEX 2 MG: 2 GUM, CHEWING BUCCAL at 05:57

## 2020-03-05 RX ADMIN — NICOTINE POLACRILEX 2 MG: 2 GUM, CHEWING BUCCAL at 21:44

## 2020-03-05 RX ADMIN — NICOTINE POLACRILEX 2 MG: 2 GUM, CHEWING BUCCAL at 13:16

## 2020-03-05 RX ADMIN — DIVALPROEX SODIUM 1000 MG: 500 TABLET, DELAYED RELEASE ORAL at 18:49

## 2020-03-05 RX ADMIN — ACETAMINOPHEN 975 MG: 325 TABLET ORAL at 15:50

## 2020-03-05 NOTE — PROGRESS NOTES
Patient about the unit  Manic, pressured, tangential  Again making "jokes" that RN was giving her "mollys" at morning medications pass  " You do realize I'm just joking, right"? Patient began talking riding her bike at night, putting on her helmet and going to meet up with friends to drink a few beers and smoke a couple joints  Then began talking about her boyfriend of 20+ years, his family and how he no longer wants to be with her and how her children do not like her  Also asking if she could get another UDS to see the "level of pot" in her system  Compliant with morning medications  Will monitor

## 2020-03-05 NOTE — TREATMENT TEAM
03/05/20 0744   Team Meeting   Meeting Type Daily Rounds   Team Members Present   Team Members Present Physician;Nurse;; Other (Discipline and Name)   Physician Team Member [place]   Nursing Team Member [facility] Management Team Member Carmine Certain and Loris Angelucci   Other (Discipline and Name) Victor Manuel Sultana NP, medical student   Patient/Family Present   Patient Present No   Patient's Family Present No     Increasing medication  Reordering labs for Saturday  Mouth checks to be ordered

## 2020-03-05 NOTE — PROGRESS NOTES
Progress Note - Behavioral Health   Jessenia Rubio 42 y o  female MRN: [id number]  Unit/Bed#: [facility] 345-02 Encounter: [id number]    Assessment/Plan   Principal Problem:    Bipolar I disorder, most recent episode manic, severe with psychotic features ([address] )  Active Problems:    Mild intermittent asthma without complication    Cannabis abuse, in remission    Medical clearance for psychiatric admission    Hypokalemia    Prediabetes    Pain in both lower extremities      Subjective:303- Patient was seen today for continuation of care, records reviewed and  patient was discussed with the morning case review team Kendra presented cooperative and with elated mood  Reports being cranky in the morning due to menses discomfort  During our encounter her mood was elated and expansive  Speech was tangential and disorganized but easily redirectable  Reports her mood as "in love" with her children, and fiancee  As per nursing report when she was given medications last night shought the pills were "mollys"  Today she is asking for a drug test to clean all the marihuana so she could get a job  Reports sleeping "like a baby"  Reports having increased appetite  Has been attending groups  Denies endorsing any suicidal thoughts, denies auditory visual hallucinations  Patient denies endorsing any suicidal or homicidal ideation  Does seem to be experiencing manic symptoms during our encounter  Remains medication compliance  Patient was agreeable with increase of Depakote after initially stating that she did want to be too sedated to take care of her children home  Will reassess the need of increasing risperidone tomorrow Denies any side effects from medications  VPA 77 6 yesterday      Psychiatric Review of Systems:    Sleep: improved, slept better  Appetite: normal  Medication side effects: No   ROS: reports abdominal pain and abdominal cramps, denies any headache, shortness of breath or chest pain, all other systems are negative    Vitals:  Vitals:    03/05/20 0725   BP: 127/80   Pulse: 102   Resp: 16   Temp: 99 4 °F (37 4 °C)   SpO2:        Mental Status Evaluation:    Appearance:  dressed in hospital attire   Behavior:  cooperative, good eye contact   Speech:  pressured, hypertalkative, flight of ideas   Mood:  manic " In love"   Affect:  overbright, expansive   Thought Process:  circumstantial, flight of ideas   Associations: flight of ideas   Thought Content:  no overt delusions   Perceptual Disturbances: no auditory hallucinations, no visual hallucinations   Risk Potential: Suicidal ideation - None  Homicidal ideation - None  Potential for aggression - No   Sensorium:  oriented to person, place and time/date   Memory:  recent and remote memory grossly intact   Consciousness:  alert and awake   Attention: decreased concentration and decreased attention span   Insight:  limited   Judgment: limited   Gait/Station: normal gait/station, normal balance   Motor Activity: no abnormal movements     Laboratory results:    I have personally reviewed all pertinent laboratory/tests results    Most Recent Labs:   Lab Results   Component Value Date    WBC 6 90 03/04/2020    RBC 4 12 03/04/2020    HGB 13 0 03/04/2020    HCT 39 5 03/04/2020     03/04/2020    RDW 13 9 03/04/2020    NEUTROABS 2 90 03/04/2020    SODIUM 139 03/04/2020    K 4 0 03/04/2020     03/04/2020    CO2 29 03/04/2020    BUN 10 03/04/2020    CREATININE 0 49 (L) 03/04/2020    GLUC 93 03/04/2020    GLUF 93 03/04/2020    CALCIUM 8 7 03/04/2020    AST 16 03/04/2020    ALT 23 03/04/2020    ALKPHOS 61 03/04/2020    TP 6 8 03/04/2020    ALB 3 7 03/04/2020    TBILI 0 20 03/04/2020    CHOLESTEROL 169 02/28/2020    HDL 53 02/28/2020    TRIG 72 02/28/2020    LDLCALC 102 02/28/2020    NONHDLC 116 02/28/2020    VALPROICTOT 77 6 03/04/2020    [id number] 1 260 02/28/2020    PREGSERUM Negative 02/28/2020    RPR Non-Reactive 02/28/2020    HGBA1C 6 0 (H) 02/28/2020     02/28/2020       Progress Toward Goals:     Improving    Recommended Treatment:     All current active medications have been reviewed  Encourage group therapy, milieu therapy and occupational therapy  Behavioral Health checks every 7 minutes  Mouth checks to assure compliance with medications  On 303 commitment     Increase Depakote to 1000 mg francois at HS    Check Depakote level, CBC/diff and CMP in 2 days    Current Facility-Administered Medications:  acetaminophen 650 mg Oral Q6H PRN Laverda Hamper, MD   acetaminophen 650 mg Oral Q4H PRN Laverda Hamper, MD   acetaminophen 975 mg Oral Q6H PRN Laverda Hamper, MD   albuterol 2 puff Inhalation Q6H PRN Laverda Hamper, MD   aluminum-magnesium hydroxide-simethicone 15 mL Oral Q4H PRN Laverda Hamper, MD   benztropine 1 mg Intramuscular Q6H PRN Laverda Hamper, MD   benztropine 1 mg Oral Q6H PRN Laverda Hamper, MD   divalproex sodium 500 mg Oral Daily Laverda Hamper, MD   divalproex sodium 750 mg Oral QPM Laverda Hamper, MD   haloperidol 5 mg Oral Q6H PRN Laverda Hamper, MD   haloperidol lactate 5 mg Intramuscular Q6H PRN Laverda Hamper, MD   hydrOXYzine HCL 25 mg Oral Q4H PRN Yenny Hancock, DO   hydrOXYzine HCL 25 mg Oral Q6H PRN Laverda Hamper, MD   hydrOXYzine HCL 50 mg Oral Q6H PRN Laverda Hamper, MD   hydrOXYzine HCL 75 mg Oral Q6H PRN Laverda Hamper, MD   influenza vaccine 0 5 mL Intramuscular Once Dayana Gupta, MD   LORazepam 1 mg Intramuscular Q6H PRN Laverda Hamper, MD   magnesium hydroxide 30 mL Oral Daily PRN Laverda Hamper, MD   nicotine polacrilex 2 mg Oral Q2H PRN Laverda Hamper, MD   OLANZapine 10 mg Intramuscular Q3H PRN Laverda Hamper, MD   OLANZapine 5 mg Oral Q3H PRN Yenny Josefina, DO   risperiDONE 1 mg Oral Q4H PRN Laverda Hamper, MD   risperiDONE 2 mg Oral BID Laverda Hamper, MD   traZODone 50 mg Oral HS PRN Yenny Hancock, DO       Risks / Benefits of Treatment:     Risks of medications in pregnancy explained if female patient  Patient verbalizes understanding and agrees to notify her doctor if she becomes pregnant  Counseling / Coordination of Care:     Patient's progress reviewed with nursing staff  Medications, treatment progress and treatment plan reviewed with patient  Supportive counseling provided to the patient            Rodney Moran

## 2020-03-06 PROCEDURE — 99232 SBSQ HOSP IP/OBS MODERATE 35: CPT | Performed by: PSYCHIATRY & NEUROLOGY

## 2020-03-06 RX ORDER — RISPERIDONE 2 MG/1
2 TABLET, FILM COATED ORAL DAILY
Status: DISCONTINUED | OUTPATIENT
Start: 2020-03-06 | End: 2020-03-08

## 2020-03-06 RX ADMIN — RISPERIDONE 3 MG: 2 TABLET ORAL at 21:15

## 2020-03-06 RX ADMIN — DIVALPROEX SODIUM 500 MG: 500 TABLET, DELAYED RELEASE ORAL at 09:01

## 2020-03-06 RX ADMIN — NICOTINE POLACRILEX 2 MG: 2 GUM, CHEWING BUCCAL at 07:42

## 2020-03-06 RX ADMIN — RISPERIDONE 2 MG: 2 TABLET ORAL at 09:57

## 2020-03-06 RX ADMIN — DIVALPROEX SODIUM 1000 MG: 500 TABLET, DELAYED RELEASE ORAL at 17:41

## 2020-03-06 RX ADMIN — ACETAMINOPHEN 975 MG: 325 TABLET ORAL at 16:37

## 2020-03-06 RX ADMIN — NICOTINE POLACRILEX 2 MG: 2 GUM, CHEWING BUCCAL at 17:42

## 2020-03-06 RX ADMIN — NICOTINE POLACRILEX 2 MG: 2 GUM, CHEWING BUCCAL at 13:19

## 2020-03-06 RX ADMIN — NICOTINE POLACRILEX 2 MG: 2 GUM, CHEWING BUCCAL at 22:25

## 2020-03-06 NOTE — TREATMENT TEAM
03/06/20 1100   Activity/Group Checklist   Group Other (Comment)  (OPEN STUDIO/Art Therapy, Social Interaction-Free Expression)   Attendance Attended   Attendance Duration (min) 46-60   Interactions Interacted appropriately   Affect/Mood Appropriate   Goals Achieved Able to listen to others; Able to engage in interactions

## 2020-03-06 NOTE — PLAN OF CARE
Problem: INVOLUNTARY ADMIT  Goal: Will cooperate with staff recommendations and doctor's orders and will demonstrate appropriate behavior  Description  INTERVENTIONS:  - Treat underlying conditions and offer medication as ordered  - Educate regarding involuntary admission procedures and rules  - Utilize positive consistent limit setting strategies to support patient and staff safety  Outcome: Progressing     Problem: PSYCHOSIS  Goal: Will report no hallucinations or delusions  Description  Interventions:  - Administer medication as  ordered  - Every waking shifts and PRN assess for the presence of hallucinations and or delusions  - Assist with reality testing to support increasing orientation  - Assess if patient's hallucinations or delusions are encouraging self-harm or harm to others and intervene as appropriate  Outcome: Progressing     Problem: Alteration in Thoughts and Perception  Goal: Agree to be compliant with medication regime, as prescribed and report medication side effects  Description  Interventions:  - Offer appropriate PRN medication and supervise ingestion; conduct AIMS, as needed   Outcome: Progressing  Goal: Recognize dysfunctional thoughts, communicate reality-based thoughts at the time of discharge  Description  Interventions:  - Provide medication and psycho-education to assist patient in compliance and developing insight into his/her illness   Outcome: Progressing

## 2020-03-06 NOTE — PROGRESS NOTES
Patient has been cooperative with medications and euphoric, tangential, pressured, and intrusive in conversation today  Early this morning she reported to RN that she had 10/10 menstrual cramping, and then five minutes later came back and said her pain was now 2/10 and she no longer needed medication

## 2020-03-06 NOTE — PROGRESS NOTES
Progress Note - Behavioral Health   Mittie Stage 42 y o  female MRN: [id number]  Unit/Bed#: [facility] 345-02 Encounter: [id number]    Assessment/Plan   Principal Problem:    Bipolar I disorder, most recent episode manic, severe with psychotic features ([address] )  Active Problems:    Mild intermittent asthma without complication    Cannabis abuse, in remission    Medical clearance for psychiatric admission    Hypokalemia    Prediabetes    Pain in both lower extremities      Subjective:303- Patient was seen today for continuation of care, records reviewed and  patient was discussed with the morning case review team Kendra presents cooperative, with elated and expansive mood  Speech is tangential and patient is disorganized  Reports her mood as "I am wonderful"  She shared that she called her mother and talked to her very early  Reports sleeping well and has good appetite  Reports attending groups and everyone is "wonderful"  Reports her menses last 7-15 days sometimes, denies any current abdominal pain  Denies endorsing suicidal thoughts  As per nursing report she is preoccupied with male peers at times  Patient denies endorsing any suicidal or homicidal ideation  Denies endorsing auditory visual hallucinations but remains with manic symptoms as per report and during our encounter  Remains medication compliance  Denies any side effects from medications      Psychiatric Review of Systems:    Sleep: slept better  Appetite: normal  Medication side effects: No   ROS: no complaints, denies any headache, shortness of breath or chest pain, all other systems are negative    Vitals:  Vitals:    03/06/20 0733   BP: 123/85   Pulse: (!) 110   Resp: 16   Temp: 98 8 °F (37 1 °C)   SpO2:        Mental Status Evaluation:    Appearance:  casually dressed   Behavior:  cooperative, calm, bizarre, more verbal, good eye contact   Speech:  pressured, hypertalkative, disorganized, flight of ideas   Mood:  euphoric, manic "100 %"   Affect: expansive, increased in intensity   Thought Process:  tangential, racing of thoughts, flight of ideas   Associations: tangential associations, flight of ideas   Thought Content:  no overt delusions, ruminating thoughts   Perceptual Disturbances: no auditory hallucinations, no visual hallucinations   Risk Potential: Suicidal ideation - None  Homicidal ideation - None  Potential for aggression - No   Sensorium:  oriented to person, place and situation   Memory:  recent and remote memory grossly intact   Consciousness:  alert and awake   Attention: decreased concentration and decreased attention span   Insight:  limited   Judgment: limited   Gait/Station: normal gait/station, normal balance   Motor Activity: no abnormal movements     Laboratory results:    I have personally reviewed all pertinent laboratory/tests results    Most Recent Labs:   Lab Results   Component Value Date    WBC 6 90 03/04/2020    RBC 4 12 03/04/2020    HGB 13 0 03/04/2020    HCT 39 5 03/04/2020     03/04/2020    RDW 13 9 03/04/2020    NEUTROABS 2 90 03/04/2020    SODIUM 139 03/04/2020    K 4 0 03/04/2020     03/04/2020    CO2 29 03/04/2020    BUN 10 03/04/2020    CREATININE 0 49 (L) 03/04/2020    GLUC 93 03/04/2020    GLUF 93 03/04/2020    CALCIUM 8 7 03/04/2020    AST 16 03/04/2020    ALT 23 03/04/2020    ALKPHOS 61 03/04/2020    TP 6 8 03/04/2020    ALB 3 7 03/04/2020    TBILI 0 20 03/04/2020    CHOLESTEROL 169 02/28/2020    HDL 53 02/28/2020    TRIG 72 02/28/2020    LDLCALC 102 02/28/2020    NONHDLC 116 02/28/2020    VALPROICTOT 77 6 03/04/2020    [id number] 1 260 02/28/2020    PREGSERUM Negative 02/28/2020    RPR Non-Reactive 02/28/2020    HGBA1C 6 0 (H) 02/28/2020     02/28/2020       Progress Toward Goals:     Improving    Recommended Treatment:     All current active medications have been reviewed  Encourage group therapy, milieu therapy and occupational therapy  Behavioral Health checks every 7 minutes  On 303 commitment     Increase Risperidone to 3 mg at HS  Recheck Depakote level, CBC/diff and CMP in 1 day    Current Facility-Administered Medications:  acetaminophen 650 mg Oral Q6H PRN Berneda Ice, MD   acetaminophen 650 mg Oral Q4H PRN Berneda Ice, MD   acetaminophen 975 mg Oral Q6H PRN Berneda Ice, MD   albuterol 2 puff Inhalation Q6H PRN Berneda Ice, MD   aluminum-magnesium hydroxide-simethicone 15 mL Oral Q4H PRN Berneda Ice, MD   benztropine 1 mg Intramuscular Q6H PRN Berneda Ice, MD   benztropine 1 mg Oral Q6H PRN Berneda Ice, MD   divalproex sodium 1,000 mg Oral QPM Berneda Ice, MD   divalproex sodium 500 mg Oral Daily Berneda Ice, MD   haloperidol 5 mg Oral Q6H PRN Berneda Ice, MD   haloperidol lactate 5 mg Intramuscular Q6H PRN Berneda Ice, MD   hydrOXYzine HCL 25 mg Oral Q4H PRN Liberty Cake, DO   hydrOXYzine HCL 25 mg Oral Q6H PRN Berneda Ice, MD   hydrOXYzine HCL 50 mg Oral Q6H PRN Berneda Ice, MD   hydrOXYzine HCL 75 mg Oral Q6H PRN Berneda Ice, MD   influenza vaccine 0 5 mL Intramuscular Once Lore Stagers, MD   LORazepam 1 mg Intramuscular Q6H PRN Berneda Ice, MD   magnesium hydroxide 30 mL Oral Daily PRN Berneda Ice, MD   nicotine polacrilex 2 mg Oral Q2H PRN Berneda Ice, MD   OLANZapine 10 mg Intramuscular Q3H PRN Berneda Ice, MD   OLANZapine 5 mg Oral Q3H PRN Liberty Cake, DO   risperiDONE 1 mg Oral Q4H PRN Bernkarli Ice, MD   risperiDONE 2 mg Oral Daily Domonique L Devora, CRNP   risperiDONE 3 mg Oral HS Domonique L Devora, CRNP   traZODone 50 mg Oral HS PRN Liberty Cake, DO       Risks / Benefits of Treatment:     Risks, benefits, and possible side effects of medications explained to patient and patient verbalizes understanding and agreement for treatment  Counseling / Coordination of Care:     Patient's progress reviewed with nursing staff    Medications, treatment progress and treatment plan reviewed with patient  Supportive counseling provided to the patient            Malou Tobin

## 2020-03-06 NOTE — PROGRESS NOTES
Pt about unit, bright and social with peers  Less intrusive and labile  Continues bizarre but less  Denies SI/HI and hallucinations  Compliant with meds, mouth checks and care  Continues to take tylenol for menses cramps with good effect but seems to have had menses a long time  Will continue to monitor

## 2020-03-06 NOTE — NURSING NOTE
Patient is alert and orientated this shift  Able to make needs known  Patient remains elated and euphoric  Minimal insight into mental health  Intrusive with peers, but pleasant  Compliant with medications and treatment  Verbalizes no complaints  Laughs when asked if suicidal or homicidal   Will continue to monitor

## 2020-03-06 NOTE — TREATMENT TEAM
03/06/20 0700   Team Meeting   Meeting Type Daily Rounds   Team Members Present   Team Members Present Physician;Nurse;; Other (Discipline and Name)   Physician Team Member [address] Team Member [facility] [place] Team Member Alexandria Aldridge   Other (Discipline and Name) Shruti Orourke, students   Patient/Family Present   Patient Present No   Patient's Family Present No     Med Management  Continue to monitor patient

## 2020-03-07 LAB
ALBUMIN SERPL BCP-MCNC: 3.6 G/DL (ref 3–5.2)
ALP SERPL-CCNC: 48 U/L (ref 43–122)
ALT SERPL W P-5'-P-CCNC: 15 U/L (ref 9–52)
ANION GAP SERPL CALCULATED.3IONS-SCNC: 2 MMOL/L (ref 5–14)
AST SERPL W P-5'-P-CCNC: 15 U/L (ref 14–36)
BASOPHILS # BLD AUTO: 0 THOUSANDS/ΜL (ref 0–0.1)
BASOPHILS NFR BLD AUTO: 1 % (ref 0–1)
BILIRUB SERPL-MCNC: 0.2 MG/DL
BUN SERPL-MCNC: 11 MG/DL (ref 5–25)
CALCIUM SERPL-MCNC: 8.6 MG/DL (ref 8.4–10.2)
CHLORIDE SERPL-SCNC: 104 MMOL/L (ref 97–108)
CO2 SERPL-SCNC: 34 MMOL/L (ref 22–30)
CREAT SERPL-MCNC: 0.53 MG/DL (ref 0.6–1.2)
EOSINOPHIL # BLD AUTO: 0.2 THOUSAND/ΜL (ref 0–0.4)
EOSINOPHIL NFR BLD AUTO: 3 % (ref 0–6)
ERYTHROCYTE [DISTWIDTH] IN BLOOD BY AUTOMATED COUNT: 14 %
GFR SERPL CREATININE-BSD FRML MDRD: 140 ML/MIN/1.73SQ M
GLUCOSE P FAST SERPL-MCNC: 82 MG/DL (ref 70–99)
GLUCOSE SERPL-MCNC: 82 MG/DL (ref 70–99)
HCT VFR BLD AUTO: 39.3 % (ref 36–46)
HGB BLD-MCNC: 13.3 G/DL (ref 12–16)
LYMPHOCYTES # BLD AUTO: 3.1 THOUSANDS/ΜL (ref 0.5–4)
LYMPHOCYTES NFR BLD AUTO: 44 % (ref 25–45)
MCH RBC QN AUTO: 32.3 PG (ref 26–34)
MCHC RBC AUTO-ENTMCNC: 33.8 G/DL (ref 31–36)
MCV RBC AUTO: 96 FL (ref 80–100)
MONOCYTES # BLD AUTO: 0.4 THOUSAND/ΜL (ref 0.2–0.9)
MONOCYTES NFR BLD AUTO: 6 % (ref 1–10)
NEUTROPHILS # BLD AUTO: 3.3 THOUSANDS/ΜL (ref 1.8–7.8)
NEUTS SEG NFR BLD AUTO: 47 % (ref 45–65)
PLATELET # BLD AUTO: 254 THOUSANDS/UL (ref 150–450)
PMV BLD AUTO: 9.8 FL (ref 8.9–12.7)
POTASSIUM SERPL-SCNC: 3.7 MMOL/L (ref 3.6–5)
PROT SERPL-MCNC: 6.8 G/DL (ref 5.9–8.4)
RBC # BLD AUTO: 4.11 MILLION/UL (ref 4–5.2)
SODIUM SERPL-SCNC: 140 MMOL/L (ref 137–147)
VALPROATE SERPL-MCNC: 81.8 UG/ML (ref 50–120)
WBC # BLD AUTO: 7 THOUSAND/UL (ref 4.5–11)

## 2020-03-07 PROCEDURE — 80164 ASSAY DIPROPYLACETIC ACD TOT: CPT | Performed by: PSYCHIATRY & NEUROLOGY

## 2020-03-07 PROCEDURE — 85025 COMPLETE CBC W/AUTO DIFF WBC: CPT | Performed by: PSYCHIATRY & NEUROLOGY

## 2020-03-07 PROCEDURE — 99233 SBSQ HOSP IP/OBS HIGH 50: CPT | Performed by: PSYCHIATRY & NEUROLOGY

## 2020-03-07 PROCEDURE — 80053 COMPREHEN METABOLIC PANEL: CPT | Performed by: PSYCHIATRY & NEUROLOGY

## 2020-03-07 RX ADMIN — ACETAMINOPHEN 975 MG: 325 TABLET ORAL at 09:19

## 2020-03-07 RX ADMIN — NICOTINE POLACRILEX 2 MG: 2 GUM, CHEWING BUCCAL at 05:57

## 2020-03-07 RX ADMIN — RISPERIDONE 3 MG: 2 TABLET ORAL at 21:12

## 2020-03-07 RX ADMIN — DIVALPROEX SODIUM 500 MG: 500 TABLET, DELAYED RELEASE ORAL at 08:56

## 2020-03-07 RX ADMIN — RISPERIDONE 2 MG: 2 TABLET ORAL at 08:56

## 2020-03-07 RX ADMIN — NICOTINE POLACRILEX 2 MG: 2 GUM, CHEWING BUCCAL at 12:56

## 2020-03-07 RX ADMIN — DIVALPROEX SODIUM 1000 MG: 500 TABLET, DELAYED RELEASE ORAL at 17:10

## 2020-03-07 NOTE — PROGRESS NOTES
Patient has been calm and quiet and has been mostly in her room  Patient was dismissive towards this RN  Patient denied SI/HI and A/V hallucinations  Patient speech remains pressured

## 2020-03-07 NOTE — PROGRESS NOTES
Progress Note - RUDDY/Mary 10 42 y o  female MRN: [id number]   Unit/Bed#: [facility] 345-02 Encounter: [id number]    Behavior over the last 24 hours: terese Horton remains bizarre, disorganized, labile and manic  She is preoccupied with "going to the bank" and with paranoid thoughts "People listen to your conversation when you talk on the phone  I don't know who is listening"  Compliant with medications  Attends group therapy      Sleep: slept off and on  Appetite: normal  Medication side effects: No   ROS: reports menstrual cramps, denies any shortness of breath or chest pain, all other systems are negative    Mental Status Evaluation:    Appearance:  casually dressed   Behavior:  cooperative, bizarre   Speech:  pressured, hypertalkative, tangential   Mood:  labile, manic   Affect:  labile   Thought Process:  tangential, flight of ideas   Associations: tangential associations   Thought Content:  paranoid ideation, grandiose ideas   Perceptual Disturbances: denies auditory or visual hallucinations when asked, but appears distracted   Risk Potential: Suicidal ideation - None  Homicidal ideation - None  Potential for aggression - No   Sensorium:  oriented to person, place and time/date   Memory:  recent and remote memory grossly intact   Consciousness:  alert and awake   Attention: poor concentration and poor attention span   Insight:  poor   Judgment: poor   Gait/Station: normal gait/station, normal balance   Motor Activity: no abnormal movements     Vital signs in last 24 hours:    Temp:  [97 5 °F (36 4 °C)-98 7 °F (37 1 °C)] 97 5 °F (36 4 °C)  HR:  [] 99  Resp:  [16] 16  BP: (119-120)/(56-76) 120/56    Laboratory results: I have personally reviewed all pertinent laboratory/tests results    Last Laboratory Results with Depakote and/or Tegretol levels:   Lab Results   Component Value Date    WBC 7 00 03/07/2020    RBC 4 11 03/07/2020    HGB 13 3 03/07/2020    HCT 39 3 03/07/2020    PLT 254 03/07/2020    RDW 14 0 03/07/2020    NEUTROABS 3 30 03/07/2020    SODIUM 140 03/07/2020    K 3 7 03/07/2020     03/07/2020    CO2 34 (H) 03/07/2020    BUN 11 03/07/2020    CREATININE 0 53 (L) 03/07/2020    GLUC 82 03/07/2020    GLUF 82 03/07/2020    CALCIUM 8 6 03/07/2020    AST 15 03/07/2020    ALT 15 03/07/2020    ALKPHOS 48 03/07/2020    TP 6 8 03/07/2020    ALB 3 6 03/07/2020    TBILI 0 20 03/07/2020    VALPROICTOT 81 8 03/07/2020     Suicide/Homicide Risk Assessment:    Risk of Harm to Self:   Nursing Suicide Risk Assessment Last 24 hours: Low Risk to Self: No evidence of suicidal thoughts or history; Moderate Risk to Self: Increasing use of alcohol or other substances  ;    Current Specific Risk Factors include: diagnosis of mood disorder, current unstable mood, current psychotic symptoms, poor reasoning  Protective Factors: no current suicidal ideation, ability to communicate with staff on the unit, taking medications as ordered on the unit  Based on today's assessment, Yarelis Diggs presents the following risk of harm to self: moderate    Risk of Harm to Others:  Nursing Homicide Risk Assessment: Violence Risk to Others: Denies within past 6 months  Based on today's assessment, Yarelis Diggs presents the following risk of harm to others: none    The following interventions are recommended: behavioral checks every 7 minutes, continued hospitalization on locked unit    Progress Toward Goals: no significant improvement, still disorganized, remains labile, poor reality testing    Assessment/Plan   Principal Problem:    Bipolar I disorder, most recent episode manic, severe with psychotic features ([facility] [address] )  Active Problems:    Cannabis abuse, in remission    Mild intermittent asthma without complication    Medical clearance for psychiatric admission    Hypokalemia    Prediabetes    Pain in both lower extremities    Recommended Treatment:     Planned medication and treatment changes:     All current active medications have been reviewed  Encourage group therapy, milieu therapy and occupational therapy  Behavioral Health checks every 7 minutes  On 303 commitment up to 20 days     Continue current medications:    Current Facility-Administered Medications:  acetaminophen 650 mg Oral Q6H PRN Shaheen Escalera, MD   acetaminophen 650 mg Oral Q4H PRN Shaheen Escalera, MD   acetaminophen 975 mg Oral Q6H PRN Shaheen Shiviet, MD   albuterol 2 puff Inhalation Q6H PRN Shaheen Escalera, MD   aluminum-magnesium hydroxide-simethicone 15 mL Oral Q4H PRN Shaheen Shiviet, MD   benztropine 1 mg Intramuscular Q6H PRN Shaheen Shiver, MD   benztropine 1 mg Oral Q6H PRN Shaheen Shiver, MD   divalproex sodium 1,000 mg Oral QPM [place] Shiver, MD   divalproex sodium 500 mg Oral Daily [place] Shiver, MD   haloperidol 5 mg Oral Q6H PRN [place] Shiver, MD   haloperidol lactate 5 mg Intramuscular Q6H PRN Shaheen Shiver, MD   hydrOXYzine HCL 25 mg Oral Q4H PRN Hernán Driscoll,    hydrOXYzine HCL 25 mg Oral Q6H PRN Shaheen Shiviet, MD   hydrOXYzine HCL 50 mg Oral Q6H PRN Shaheen Shiviet, MD   hydrOXYzine HCL 75 mg Oral Q6H PRN Shaheen Shiviet, MD   influenza vaccine 0 5 mL Intramuscular Once Whit Mathew MD   LORazepam 1 mg Intramuscular Q6H PRN Shaheen Shiviet, MD   magnesium hydroxide 30 mL Oral Daily PRN Shaheen Escalera, MD   nicotine polacrilex 2 mg Oral Q2H PRN Shaheen Escalera, MD   OLANZapine 10 mg Intramuscular Q3H PRN Shaheen Escalera, MD   OLANZapine 5 mg Oral Q3H PRN Hernán Driscoll,    risperiDONE 1 mg Oral Q4H PRN Shaheen Escalera, MD   risperiDONE 2 mg Oral Daily Domonique L Devora, CRNP   risperiDONE 3 mg Oral HS Domonique L Devora, CRNP   traZODone 50 mg Oral HS PRN Danella Yamila, DO       Risks / Benefits of Treatment:    Risks, benefits, and possible side effects of medications explained to patient   Patient has limited understanding of risks and benefits of treatment at this time, but agrees to take medications as prescribed  Risks of medications in pregnancy explained if female patient  Patient verbalizes understanding and agrees to notify her doctor if she becomes pregnant  Counseling / Coordination of Care:    Patient's progress reviewed with nursing staff  Medications, treatment progress and treatment plan reviewed with patient      Iggy Ivan MD 03/07/20

## 2020-03-07 NOTE — PLAN OF CARE
Problem: INVOLUNTARY ADMIT  Goal: Will cooperate with staff recommendations and doctor's orders and will demonstrate appropriate behavior  Description  INTERVENTIONS:  - Treat underlying conditions and offer medication as ordered  - Educate regarding involuntary admission procedures and rules  - Utilize positive consistent limit setting strategies to support patient and staff safety  Outcome: Progressing     Problem: DISCHARGE PLANNING  Goal: Discharge to home or other facility with appropriate resources  Description  INTERVENTIONS:  - Identify barriers to discharge w/patient and caregiver  - Arrange for needed discharge resources and transportation as appropriate  - Identify discharge learning needs (meds, wound care, etc )  - Arrange for interpretive services to assist at discharge as needed  - Refer to Case Management Department for coordinating discharge planning if the patient needs post-hospital services based on physician/advanced practitioner order or complex needs related to functional status, cognitive ability, or social support system  Outcome: Progressing     Problem: PSYCHOSIS  Goal: Will report no hallucinations or delusions  Description  Interventions:  - Administer medication as  ordered  - Every waking shifts and PRN assess for the presence of hallucinations and or delusions  - Assist with reality testing to support increasing orientation  - Assess if patient's hallucinations or delusions are encouraging self-harm or harm to others and intervene as appropriate  Outcome: Progressing     Problem: Alteration in Thoughts and Perception  Goal: Agree to be compliant with medication regime, as prescribed and report medication side effects  Description  Interventions:  - Offer appropriate PRN medication and supervise ingestion; conduct AIMS, as needed   Outcome: Progressing     Problem: Ineffective Coping  Goal: Participates in unit activities  Description  Interventions:  - Provide therapeutic environment   - Provide required programming   - Redirect inappropriate behaviors   Outcome: Not Progressing     Problem: Alteration in Thoughts and Perception  Goal: Recognize dysfunctional thoughts, communicate reality-based thoughts at the time of discharge  Description  Interventions:  - Provide medication and psycho-education to assist patient in compliance and developing insight into his/her illness   Outcome: Not Progressing

## 2020-03-07 NOTE — PROGRESS NOTES
Assumed care of patient at 1900  Patient denies all symptoms, however is pressured and grandiose when speaking with RN  She discussed how she and her "fiance of 24 years" won "millions of dollars last year" and he bought her many diamonds and how he is going to the dentist to have all of his teeth pulled and replaced with gold and hien teeth  Patient also stated "I cannot wait to go home so I can moisturize and paint my baby girl's nails " Pt is bizarre in conversation but pleasant  Will continue to monitor

## 2020-03-08 PROCEDURE — 99233 SBSQ HOSP IP/OBS HIGH 50: CPT | Performed by: PSYCHIATRY & NEUROLOGY

## 2020-03-08 RX ORDER — IBUPROFEN 600 MG/1
600 TABLET ORAL EVERY 6 HOURS PRN
Status: DISCONTINUED | OUTPATIENT
Start: 2020-03-08 | End: 2020-03-16 | Stop reason: HOSPADM

## 2020-03-08 RX ADMIN — NICOTINE POLACRILEX 2 MG: 2 GUM, CHEWING BUCCAL at 17:01

## 2020-03-08 RX ADMIN — IBUPROFEN 600 MG: 600 TABLET ORAL at 10:45

## 2020-03-08 RX ADMIN — DIVALPROEX SODIUM 500 MG: 500 TABLET, DELAYED RELEASE ORAL at 08:07

## 2020-03-08 RX ADMIN — NICOTINE POLACRILEX 2 MG: 2 GUM, CHEWING BUCCAL at 10:26

## 2020-03-08 RX ADMIN — RISPERIDONE 3 MG: 2 TABLET ORAL at 21:34

## 2020-03-08 RX ADMIN — NICOTINE POLACRILEX 2 MG: 2 GUM, CHEWING BUCCAL at 20:13

## 2020-03-08 RX ADMIN — ACETAMINOPHEN 975 MG: 325 TABLET ORAL at 20:10

## 2020-03-08 RX ADMIN — RISPERIDONE 2 MG: 2 TABLET ORAL at 08:07

## 2020-03-08 RX ADMIN — NICOTINE POLACRILEX 2 MG: 2 GUM, CHEWING BUCCAL at 07:32

## 2020-03-08 RX ADMIN — DIVALPROEX SODIUM 1000 MG: 500 TABLET, DELAYED RELEASE ORAL at 17:43

## 2020-03-08 RX ADMIN — ALBUTEROL SULFATE 2 PUFF: 90 AEROSOL, METERED RESPIRATORY (INHALATION) at 08:28

## 2020-03-08 RX ADMIN — NICOTINE POLACRILEX 2 MG: 2 GUM, CHEWING BUCCAL at 13:38

## 2020-03-08 NOTE — PROGRESS NOTES
Assumed care of patient at 1900  Patient denies all symptoms to RN, but is tearful and states "I was just on the phone with my second son and he asked why I keep calling and says he doesn't want to talk to me  My  says the therapist said I am telling everyone we are millionaires and I never said that, that's not true  All of his money is his own, he won it in a settlement " Pt given emotional reassurance  Pt responsive to this  She continues to appear manic and disorganized  Will monitor

## 2020-03-08 NOTE — PLAN OF CARE
Problem: INVOLUNTARY ADMIT  Goal: Will cooperate with staff recommendations and doctor's orders and will demonstrate appropriate behavior  Description  INTERVENTIONS:  - Treat underlying conditions and offer medication as ordered  - Educate regarding involuntary admission procedures and rules  - Utilize positive consistent limit setting strategies to support patient and staff safety  Outcome: Progressing     Problem: DISCHARGE PLANNING  Goal: Discharge to home or other facility with appropriate resources  Description  INTERVENTIONS:  - Identify barriers to discharge w/patient and caregiver  - Arrange for needed discharge resources and transportation as appropriate  - Identify discharge learning needs (meds, wound care, etc )  - Arrange for interpretive services to assist at discharge as needed  - Refer to Case Management Department for coordinating discharge planning if the patient needs post-hospital services based on physician/advanced practitioner order or complex needs related to functional status, cognitive ability, or social support system  Outcome: Progressing     Problem: Ineffective Coping  Goal: Participates in unit activities  Description  Interventions:  - Provide therapeutic environment   - Provide required programming   - Redirect inappropriate behaviors   Outcome: Progressing     Problem: PSYCHOSIS  Goal: Will report no hallucinations or delusions  Description  Interventions:  - Administer medication as  ordered  - Every waking shifts and PRN assess for the presence of hallucinations and or delusions  - Assist with reality testing to support increasing orientation  - Assess if patient's hallucinations or delusions are encouraging self-harm or harm to others and intervene as appropriate  Outcome: Progressing     Problem: Alteration in Thoughts and Perception  Goal: Agree to be compliant with medication regime, as prescribed and report medication side effects  Description  Interventions:  - Offer appropriate PRN medication and supervise ingestion; conduct AIMS, as needed   Outcome: Progressing     Problem: Alteration in Thoughts and Perception  Goal: Recognize dysfunctional thoughts, communicate reality-based thoughts at the time of discharge  Description  Interventions:  - Provide medication and psycho-education to assist patient in compliance and developing insight into his/her illness   Outcome: Not Progressing

## 2020-03-08 NOTE — TREATMENT TEAM
03/07/20 1030   Activity/Group Checklist   Group Other (Comment)  (Surviving Trauma Group/Educational, Open Discussion)   Attendance Attended   Attendance Duration (min) 16-30  (Patient left group early)   Interactions Interacted appropriately   Affect/Mood Appropriate   Goals Achieved Able to listen to others; Able to engage in interactions

## 2020-03-08 NOTE — PROGRESS NOTES
Progress Note - Behavioral Health     Cierra Gonzalez 42 y o  female MRN: [id number]   Unit/Bed#: [facility] 344-02 Encounter: [id number]    Behavior over the last 24 hours: alejandro Olivas is still bizarre, disorganized and manic  She was placed on continual observation last night due to inappropriate sexual behavior with peers  She still has elated mood and overbright affect "I feel amazing" Has been taking medications  Attends some groups  Sleep: slept off and on  Appetite: normal  Medication side effects: No   ROS: reports menstrual cramps, denies any shortness of breath or chest pain, all other systems are negative    Mental Status Evaluation:    Appearance:  casually dressed   Behavior:  cooperative, bizarre   Speech:  pressured, hypertalkative   Mood:  labile, manic   Affect:  labile   Thought Process:  tangential, flight of ideas   Associations: tangential associations   Thought Content:  sexual preoccupation, paranoid ideation, grandiose ideas   Perceptual Disturbances: denies auditory or visual hallucinations when asked, but appears distracted   Risk Potential: Suicidal ideation - None  Homicidal ideation - None  Potential for aggression - No   Sensorium:  oriented to person, place and time/date   Memory:  recent and remote memory grossly intact   Consciousness:  alert and awake   Attention/Concentration: poor concentration and poor attention span   Insight:  poor   Judgment: poor   Gait/Station: normal gait/station, normal balance   Motor Activity: no abnormal movements     Vital signs in last 24 hours:    Temp:  [98 °F (36 7 °C)-98 4 °F (36 9 °C)] 98 4 °F (36 9 °C)  HR:  [] 103  Resp:  [16] 16  BP: (123-133)/(70-77) 123/70    Laboratory results: I have personally reviewed all pertinent laboratory/tests results      Suicide/Homicide Risk Assessment:    Risk of Harm to Self:   Nursing Suicide Risk Assessment Last 24 hours: Low Risk to Self: No evidence of suicidal thoughts or history;  Moderate Risk to Self: Increasing use of alcohol or other substances  ;    Current Specific Risk Factors include: diagnosis of mood disorder, current unstable mood, current psychotic symptoms, poor reasoning  Protective Factors: no current suicidal ideation, ability to communicate with staff on the unit, taking medications as ordered on the unit  Based on today's assessment, Bella Olivas presents the following risk of harm to self: moderate    Risk of Harm to Others:  Nursing Homicide Risk Assessment: Violence Risk to Others: Denies within past 6 months  Based on today's assessment, Bella Olivas presents the following risk of harm to others: minimal    The following interventions are recommended: continued hospitalization on locked unit, continual observation close proximity    Progress Toward Goals: regressed, still disorganized, remains labile, poor insight, poor reality testing    Assessment/Plan   Principal Problem:    Bipolar I disorder, most recent episode manic, severe with psychotic features ([facility] [address] )  Active Problems:    Cannabis abuse, in remission    Mild intermittent asthma without complication    Medical clearance for psychiatric admission    Hypokalemia    Prediabetes    Pain in both lower extremities    Recommended Treatment:     Planned medication and treatment changes:     All current active medications have been reviewed  Encourage group therapy, milieu therapy and occupational therapy  Behavioral Health checks every 7 minutes  On 303 commitment up to 20 days  Increase Risperdal to 3 mg bid to help with mood    Continue all other medications:    Current Facility-Administered Medications:  acetaminophen 650 mg Oral Q6H PRN Airam Chang MD   acetaminophen 975 mg Oral Q6H PRN Airam Chang MD   albuterol 2 puff Inhalation Q6H PRN Airam Chang MD   aluminum-magnesium hydroxide-simethicone 15 mL Oral Q4H PRN Airam Chang MD   benztropine 1 mg Intramuscular Q6H PRN Airam Chang MD   benztropine 1 mg Oral Q6H PRN Airam Chang MD   divalproex sodium 1,000 mg Oral QPM Airam Chang MD   divalproex sodium 500 mg Oral Daily Airam Chang MD   haloperidol 5 mg Oral Q6H PRN Airam Chang MD   haloperidol lactate 5 mg Intramuscular Q6H PRN Airam Chang MD   hydrOXYzine HCL 25 mg Oral Q4H PRN Derral Moris, DO   hydrOXYzine HCL 25 mg Oral Q6H PRN Airam Chang MD   hydrOXYzine HCL 50 mg Oral Q6H PRN Airam Chang MD   hydrOXYzine HCL 75 mg Oral Q6H PRN Airam Chang MD   ibuprofen 600 mg Oral Q6H PRN Airam Chang MD   influenza vaccine 0 5 mL Intramuscular Once Shaila Nereyda, MD   LORazepam 1 mg Intramuscular Q6H PRN Airam Chang MD   magnesium hydroxide 30 mL Oral Daily PRN Airam Chang MD   nicotine polacrilex 2 mg Oral Q2H PRN Airam Chang MD   OLANZapine 10 mg Intramuscular Q3H PRN Airam Chang MD   OLANZapine 5 mg Oral Q3H PRN Derral Moris, DO   risperiDONE 1 mg Oral Q4H PRN Airam Chang MD   risperiDONE 3 mg Oral HS FRANCISCA Mckenzie   [START ON 3/9/2020] risperiDONE 3 mg Oral Daily Airam Chang MD   traZODone 50 mg Oral HS PRN Derral Moris, DO       Risks / Benefits of Treatment:    Risks, benefits, and possible side effects of medications explained to patient  Patient has limited understanding of risks and benefits of treatment at this time, but agrees to take medications as prescribed  Risks of medications in pregnancy explained if female patient  Patient verbalizes understanding and agrees to notify her doctor if she becomes pregnant  Counseling / Coordination of Care:    Patient's progress reviewed with nursing staff  Medications, treatment progress and treatment plan reviewed with patient  Medication changes discussed with patient      Lalit Adrian MD 03/08/20

## 2020-03-08 NOTE — PROGRESS NOTES
This RN asked the patient why she was on constant observation  Patient stated "I was taking a shower at 9 o'clock and when I got out at 10 the nurse asked me to talk to her in a room  There were 3 nurses there and said I was dancing  Do you want me to show you how I was moving? Then I was on 1:1  I wont talk to anyone anymore "  Patient has no insight regarding her actions or words  Patient denies SI/HI and denies A/V hallucinations  Patient has been out on the unit, seclusive to self, coloring

## 2020-03-08 NOTE — PROGRESS NOTES
It was brought to the attention of nursing staff that this patient has been sexually inappropriate with several peers; she lifted her shirt, was talking about lewd and graphic sexual situations and asking peers if they would engage in these acts with her  Patient was placed on a visual constant observation at the discretion of nursing due to these reports and complaints

## 2020-03-09 PROCEDURE — 99232 SBSQ HOSP IP/OBS MODERATE 35: CPT | Performed by: NURSE PRACTITIONER

## 2020-03-09 RX ORDER — DIVALPROEX SODIUM 500 MG/1
1000 TABLET, DELAYED RELEASE ORAL DAILY
Status: DISCONTINUED | OUTPATIENT
Start: 2020-03-10 | End: 2020-03-16 | Stop reason: HOSPADM

## 2020-03-09 RX ORDER — DIVALPROEX SODIUM 500 MG/1
500 TABLET, DELAYED RELEASE ORAL ONCE
Status: COMPLETED | OUTPATIENT
Start: 2020-03-09 | End: 2020-03-09

## 2020-03-09 RX ORDER — BENZTROPINE MESYLATE 0.5 MG/1
0.5 TABLET ORAL 2 TIMES DAILY
Status: DISCONTINUED | OUTPATIENT
Start: 2020-03-09 | End: 2020-03-16 | Stop reason: HOSPADM

## 2020-03-09 RX ADMIN — RISPERIDONE 3 MG: 2 TABLET ORAL at 08:14

## 2020-03-09 RX ADMIN — BENZTROPINE MESYLATE 0.5 MG: 0.5 TABLET ORAL at 17:07

## 2020-03-09 RX ADMIN — RISPERIDONE 3 MG: 2 TABLET ORAL at 21:00

## 2020-03-09 RX ADMIN — ACETAMINOPHEN 975 MG: 325 TABLET ORAL at 08:57

## 2020-03-09 RX ADMIN — IBUPROFEN 600 MG: 600 TABLET ORAL at 17:07

## 2020-03-09 RX ADMIN — DIVALPROEX SODIUM 500 MG: 500 TABLET, DELAYED RELEASE ORAL at 08:14

## 2020-03-09 RX ADMIN — DIVALPROEX SODIUM 500 MG: 500 TABLET, DELAYED RELEASE ORAL at 12:20

## 2020-03-09 RX ADMIN — BENZTROPINE MESYLATE 0.5 MG: 0.5 TABLET ORAL at 12:20

## 2020-03-09 RX ADMIN — DIVALPROEX SODIUM 1000 MG: 500 TABLET, DELAYED RELEASE ORAL at 17:08

## 2020-03-09 NOTE — PROGRESS NOTES
Patient remains on one to one constant observation  She has been in behavioral control and denies all symptoms  She is pleasant and seems to be less pressured  Pt is friendly and polite  No sexual inappropriateness noted or reported  Will continue to monitor

## 2020-03-09 NOTE — PROGRESS NOTES
Pt had a small amount of emesis, stating she believes she threw up because her back pain is bad  The emesis was a very small amount and appeared to be undigested food  Pt denied any other symptoms and her vital signs were stable, afebrile  Pt was given a heating pad for her back, a basin, crackers and some ginger ale  Will reassess shortly

## 2020-03-09 NOTE — PROGRESS NOTES
Pt remains on 1:1 visual observation  She is calm, cooperative with medications, complaining of drowsiness after medication increase, isolative at this time  She is denying all S/S  Pt does display pressured speech, grandiosity but appears to be improved

## 2020-03-09 NOTE — PROGRESS NOTES
Pt safety maintained on constant observation  Out of room for needs and meal  C/o feeling sedated from medication increase and was slurring words, eyes half open  Denies all symptoms and does not appear manic or have pressured speech at this time  C/o pain between her shoulders and took PRN Motrin  Ate dinner and returned to bed to lay down  Compliant with routines and care  Will continue to monitor

## 2020-03-09 NOTE — PROGRESS NOTES
Progress Note - Behavioral Health   Gloria Zelaya 42 y o  female MRN: [id number]  Unit/Bed#: [facility] 349-01 Encounter: [id number]    Assessment/Plan   Principal Problem:    Bipolar I disorder, most recent episode manic, severe with psychotic features ([address] )  Active Problems:    Mild intermittent asthma without complication    Cannabis abuse, in remission    Medical clearance for psychiatric admission    Hypokalemia    Prediabetes    Pain in both lower extremities      Subjective:Patient was seen today for continuation of care, records reviewed and  patient was discussed with the morning case review team  Brittney Noonan remains on the 1:1 constant observation was sleeping until late  in the morning  When asked about reason for constant observation she replied"I was too sexy and doing yoga moves and the luli in the wheelchair was making remarks so she went and showed her butt spinning and tossing around"  Patient reports she was feeling "great" and lost some pounds "I guess I went ahead of myself"  Reports instead of confronting him she should had told her nurse  Reports she had an episode of vomiting last night  She also reported having pain from "my epidural" from many years ago and was given Tylenol  Reports sleeping "like a baby"  Feels Risperidone it was too strong  Patient remains manic   Reports she doesn't know how she is doing when asked about her mood  Denies any suicidal thoughts  Patient denies endorsing any suicidal or homicidal ideation  Does not seem to be experiencing any manic or psychotic symptoms during our encounter  Remains medication compliance  Denies any side effects from medications  VPA was 81 8 on 03/07        Psychiatric Review of Systems:    Sleep: normal  Appetite: normal  Medication side effects: No   ROS: no complaints, denies any headache, shortness of breath or chest pain, all other systems are negative    Vitals:  Vitals:    03/09/20 0730   BP: 144/86   Pulse: 95   Resp: 16   Temp: 97 9 °F (36 6 °C)   SpO2:        Mental Status Evaluation:    Appearance:  casually dressed, dressed appropriately, adequate grooming   Behavior:  cooperative, calm, good eye contact   Speech:  fluent, pressured, scant, soft, tangential   Mood:  mildly labile, mildly irritable   Affect:  constricted   Thought Process:  circumstantial, tangential   Associations: tangential associations, flight of ideas   Thought Content:  grandiose   Perceptual Disturbances: denies auditory or visual hallucinations when asked, but appears distracted   Risk Potential: Suicidal ideation - None  Homicidal ideation - None  Potential for aggression - No   Sensorium:  oriented to person and place   Memory:  recent and remote memory grossly intact   Consciousness:  alert and awake   Attention: poor concentration and poor attention span   Insight:  limited   Judgment: limited   Gait/Station: normal gait/station, normal balance   Motor Activity: no abnormal movements     Laboratory results:    I have personally reviewed all pertinent laboratory/tests results    Most Recent Labs:   Lab Results   Component Value Date    WBC 7 00 03/07/2020    RBC 4 11 03/07/2020    HGB 13 3 03/07/2020    HCT 39 3 03/07/2020     03/07/2020    RDW 14 0 03/07/2020    NEUTROABS 3 30 03/07/2020    SODIUM 140 03/07/2020    K 3 7 03/07/2020     03/07/2020    CO2 34 (H) 03/07/2020    BUN 11 03/07/2020    CREATININE 0 53 (L) 03/07/2020    GLUC 82 03/07/2020    GLUF 82 03/07/2020    CALCIUM 8 6 03/07/2020    AST 15 03/07/2020    ALT 15 03/07/2020    ALKPHOS 48 03/07/2020    TP 6 8 03/07/2020    ALB 3 6 03/07/2020    TBILI 0 20 03/07/2020    CHOLESTEROL 169 02/28/2020    HDL 53 02/28/2020    TRIG 72 02/28/2020    LDLCALC 102 02/28/2020    NONHDLC 116 02/28/2020    VALPROICTOT 81 8 03/07/2020    [id number] 1 260 02/28/2020    PREGSERUM Negative 02/28/2020    RPR Non-Reactive 02/28/2020    HGBA1C 6 0 (H) 02/28/2020     02/28/2020       Progress Toward Goals: Improving    Recommended Treatment:     All current active medications have been reviewed  Encourage group therapy, milieu therapy and occupational therapy  Continue close observation for safety   Please Depakote to 1000 mg b i d   CBC and diff, CMP and Depakote level due on Thursday  Add Cogentin 0 5 mg b i d  EPS prophylactically    Current Facility-Administered Medications:  acetaminophen 650 mg Oral Q6H PRN Bernkarli Ice, MD   acetaminophen 975 mg Oral Q6H PRN Eddie Vasquez, MD   albuterol 2 puff Inhalation Q6H PRN Eddie Ice, MD   aluminum-magnesium hydroxide-simethicone 15 mL Oral Q4H PRN Bernkarli Ice, MD   benztropine 1 mg Intramuscular Q6H PRN Eddie Ice, MD   benztropine 0 5 mg Oral BID Berneda Ice, MD   benztropine 1 mg Oral Q6H PRN Eddie Ice, MD   divalproex sodium 1,000 mg Oral QPM Eddie Vasquez MD   [START ON 3/10/2020] divalproex sodium 1,000 mg Oral Daily Eddie Vasquez, MD   divalproex sodium 500 mg Oral Once Eddie Ice, MD   haloperidol 5 mg Oral Q6H PRN Berneda Ice, MD   haloperidol lactate 5 mg Intramuscular Q6H PRN Eddie Ice, MD   hydrOXYzine HCL 25 mg Oral Q4H PRN Liberty Cake, DO   hydrOXYzine HCL 25 mg Oral Q6H PRN Eddie Ice, MD   hydrOXYzine HCL 50 mg Oral Q6H PRN Eddie Ice, MD   hydrOXYzine HCL 75 mg Oral Q6H PRN Eddie Ice, MD   ibuprofen 600 mg Oral Q6H PRN Eddie Ice, MD   influenza vaccine 0 5 mL Intramuscular Once Lore Stagers, MD   LORazepam 1 mg Intramuscular Q6H PRN Eddie Vasquez, MD   magnesium hydroxide 30 mL Oral Daily PRN Eddie Vasquez, MD   nicotine polacrilex 2 mg Oral Q2H PRN Eddie Vasquez, MD   OLANZapine 10 mg Intramuscular Q3H PRN Eddie Vasquez, MD   OLANZapine 5 mg Oral Q3H PRN Liberty Cake, DO   risperiDONE 1 mg Oral Q4H PRN Eddie Vasquez, MD   risperiDONE 3 mg Oral HS FRANCISCA Mckenzie   risperiDONE 3 mg Oral Daily Eddie Vasquez MD traZODone 50 mg Oral HS PRN Brii Leak, DO       Risks / Benefits of Treatment:     Risks, benefits, and possible side effects of medications explained to patient and patient verbalizes understanding and agreement for treatment  Counseling / Coordination of Care:     Patient's progress reviewed with nursing staff  Medications, treatment progress and treatment plan reviewed with patient  Supportive counseling provided to the patient            Liset Humphries

## 2020-03-10 PROCEDURE — 99232 SBSQ HOSP IP/OBS MODERATE 35: CPT | Performed by: NURSE PRACTITIONER

## 2020-03-10 RX ADMIN — RISPERIDONE 3 MG: 2 TABLET ORAL at 21:12

## 2020-03-10 RX ADMIN — DIVALPROEX SODIUM 1000 MG: 500 TABLET, DELAYED RELEASE ORAL at 17:03

## 2020-03-10 RX ADMIN — ACETAMINOPHEN 975 MG: 325 TABLET ORAL at 11:05

## 2020-03-10 RX ADMIN — RISPERIDONE 3 MG: 2 TABLET ORAL at 08:26

## 2020-03-10 RX ADMIN — BENZTROPINE MESYLATE 0.5 MG: 0.5 TABLET ORAL at 08:26

## 2020-03-10 RX ADMIN — BENZTROPINE MESYLATE 0.5 MG: 0.5 TABLET ORAL at 17:04

## 2020-03-10 RX ADMIN — DIVALPROEX SODIUM 1000 MG: 500 TABLET, DELAYED RELEASE ORAL at 08:26

## 2020-03-10 NOTE — PROGRESS NOTES
Patient was cooperative with medications and depressed and scant in conversation this morning  Denied symptoms and needs other than missing her kids who she said keep asking when she's coming home  Patient has been calm and in behavioral control and her continual observation was discontinued

## 2020-03-10 NOTE — TREATMENT TEAM
03/10/20 0757   Team Meeting   Meeting Type Daily Rounds   Team Members Present   Team Members Present Physician;Nurse;;; Other (Discipline and Name)   Physician Team Member [address] Team Member [facility] Team Member Latosha Hu   Other (Discipline and Name) Rochelle Davis; students   Patient/Family Present   Patient Present No   Patient's Family Present No     Continue to monitor patient  Med Management

## 2020-03-10 NOTE — PROGRESS NOTES
Pt presents as visible in public areas as well as bedroom  Currently denies SI, HI and A/V hallucinations  Compliant with meds  Observed as calm and cooperative, appears less manic, with improved insight  Asking meaningful questions about her treatment and acknowledging maladaptive recent behaviors that led to admission and one to one  Content pertains to "missing her kids" and medication compliance after discharge  Was pleasant with nurse

## 2020-03-10 NOTE — TREATMENT TEAM
[facility] GROUP NOTE     03/10/20 1030   Activity/Group Checklist   Group Admission/Discharge  (relapse prevention plan/Ders)   Attendance Attended   Attendance Duration (min) 16-30   Interactions Interacted appropriately   Affect/Mood Appropriate

## 2020-03-10 NOTE — PROGRESS NOTES
Progress Note - Behavioral Health   Anna Mism 42 y o  female MRN: [id number]  Unit/Bed#: [facility] 349-01 Encounter: [id number]    Assessment/Plan   Principal Problem:    Bipolar I disorder, most recent episode manic, severe with psychotic features ([address] )  Active Problems:    Mild intermittent asthma without complication    Cannabis abuse, in remission    Medical clearance for psychiatric admission    Hypokalemia    Prediabetes    Pain in both lower extremities      Subjective:303- Patient was seen today for continuation of care, records reviewed and  patient was discussed with the morning case review team  Patients's constant observation was discontinued as has been in good behavioral control  Ronnell Amato presented cooperative and pleasant  She remains disorganized and rumbles but speech is more organized, brighter affect  She reports she feels very drowsy due to Depakote  Reports she feels "heart broken" because his fiance  Doesn't want to  her  She is disoriented ans she is now contraindicting to what she said about getting  last week  Reports she has been sleeping "too much" and reports she has gained "pounds"  Reports having "little glare on the right eye"  Has been attending groups, reports having good appetite  Reports having her menses since the beginning of this month  Patient was placed on the medical is to be assessed further  Patient denies endorsing any suicidal or homicidal ideation  Does not seem to be experiencing any manic or psychotic symptoms during our encounter  Remains medication compliance     Psychiatric Review of Systems:    Sleep: normal  Appetite: increased  Medication side effects: Yes - tiredness   ROS: no complaints, denies any headache, shortness of breath or chest pain, all other systems are negative    Vitals:  Vitals:    03/10/20 0730   BP: 131/71   Pulse: 77   Resp: 16   Temp: 98 °F (36 7 °C)   SpO2:        Mental Status Evaluation:    Appearance:  casually dressed Behavior:  pleasant, cooperative, calm, bizarre   Speech:  pressured, hypertalkative, soft, decreased volume, disorganized   Mood:  improved, less manic   Affect:  brighter   Thought Process:  disorganized, flight of ideas   Associations: less tangential   Thought Content:  no overt delusions   Perceptual Disturbances: no auditory hallucinations, no visual hallucinations   Risk Potential: Suicidal ideation - None  Homicidal ideation - None  Potential for aggression - No   Sensorium:  oriented to person, place, time/date and situation   Memory:  recent and remote memory grossly intact   Consciousness:  alert and awake   Attention: decreased concentration and decreased attention span   Insight:  improving   Judgment: improving   Gait/Station: normal gait/station, normal balance   Motor Activity: no abnormal movements     Laboratory results:    I have personally reviewed all pertinent laboratory/tests results    Most Recent Labs:   Lab Results   Component Value Date    WBC 7 00 03/07/2020    RBC 4 11 03/07/2020    HGB 13 3 03/07/2020    HCT 39 3 03/07/2020     03/07/2020    RDW 14 0 03/07/2020    NEUTROABS 3 30 03/07/2020    SODIUM 140 03/07/2020    K 3 7 03/07/2020     03/07/2020    CO2 34 (H) 03/07/2020    BUN 11 03/07/2020    CREATININE 0 53 (L) 03/07/2020    GLUC 82 03/07/2020    GLUF 82 03/07/2020    CALCIUM 8 6 03/07/2020    AST 15 03/07/2020    ALT 15 03/07/2020    ALKPHOS 48 03/07/2020    TP 6 8 03/07/2020    ALB 3 6 03/07/2020    TBILI 0 20 03/07/2020    CHOLESTEROL 169 02/28/2020    HDL 53 02/28/2020    TRIG 72 02/28/2020    LDLCALC 102 02/28/2020    NONHDLC 116 02/28/2020    VALPROICTOT 81 8 03/07/2020    [id number] 1 260 02/28/2020    PREGSERUM Negative 02/28/2020    RPR Non-Reactive 02/28/2020    HGBA1C 6 0 (H) 02/28/2020     02/28/2020       Progress Toward Goals:     Improving     Recommended Treatment:     All current active medications have been reviewed  Encourage group therapy, milieu therapy and occupational therapy  [facility] checks every 7 minutes  Recheck Depakote level in 2 days       Continue current medications:    Current Facility-Administered Medications:  acetaminophen 650 mg Oral Q6H PRN Leydi Jurado, MD   acetaminophen 975 mg Oral Q6H PRN Tetoillie Adrien, MD   albuterol 2 puff Inhalation Q6H PRN Lucchanning Jurado, MD   aluminum-magnesium hydroxide-simethicone 15 mL Oral Q4H PRN Ledyi Jurado, MD   benztropine 1 mg Intramuscular Q6H PRN Lucillie Adrien, MD   benztropine 0 5 mg Oral BID Leydi Jurado, MD   benztropine 1 mg Oral Q6H PRN Lucillie Adrien, MD   divalproex sodium 1,000 mg Oral QPM Lucillie Adrien, MD   divalproex sodium 1,000 mg Oral Daily Lucillie Adrien, MD   haloperidol 5 mg Oral Q6H PRN Leydi Jurado, MD   haloperidol lactate 5 mg Intramuscular Q6H PRN Tetoillie Adrien, MD   hydrOXYzine HCL 25 mg Oral Q4H PRN Chyrl Lanius, DO   hydrOXYzine HCL 25 mg Oral Q6H PRN Leydi Jurado, MD   hydrOXYzine HCL 50 mg Oral Q6H PRN Tetoillie Adrien, MD   hydrOXYzine HCL 75 mg Oral Q6H PRN Lucillie Adrien, MD   ibuprofen 600 mg Oral Q6H PRN Leydi Jurado, MD   influenza vaccine 0 5 mL Intramuscular Once Dale Crespo, MD   LORazepam 1 mg Intramuscular Q6H PRN Leydi Jurado, MD   magnesium hydroxide 30 mL Oral Daily PRN Leydi Jurado, MD   nicotine polacrilex 2 mg Oral Q2H PRN Leydi Jurado, MD   OLANZapine 10 mg Intramuscular Q3H PRN Lucillilatonia Jurado, MD   OLANZapine 5 mg Oral Q3H PRN Chyrl Lanius, DO   risperiDONE 1 mg Oral Q4H PRN Lucillilatonia Jurado, MD   risperiDONE 3 mg Oral HS Domoniquefanny Lozoya, CRNP   risperiDONE 3 mg Oral Daily Lucillie Adrien, MD   traZODone 50 mg Oral HS PRN Chyrl Lanius, DO       Risks / Benefits of Treatment:     Risks, benefits, and possible side effects of medications explained to patient and patient verbalizes understanding and agreement for treatment      Counseling / Coordination of Care: Patient's progress reviewed with nursing staff  Medications, treatment progress and treatment plan reviewed with patient  Supportive counseling provided to the patient            David Quinn

## 2020-03-10 NOTE — TREATMENT TEAM
[facility] GROUP NOTE     03/10/20 0930   Activity/Group Checklist   Group Community meeting   Attendance Attended   Attendance Duration (min) 0-15   Interactions Interacted appropriately   Affect/Mood Appropriate   Goals Achieved Discussed coping strategies; Identified feelings; Able to listen to others; Able to engage in interactions; Able to self-disclose

## 2020-03-11 PROBLEM — M79.605 PAIN IN BOTH LOWER EXTREMITIES: Status: RESOLVED | Noted: 2020-02-29 | Resolved: 2020-03-11

## 2020-03-11 PROBLEM — N92.0 HEAVY MENSTRUAL PERIOD: Status: ACTIVE | Noted: 2020-03-11

## 2020-03-11 PROBLEM — M79.604 PAIN IN BOTH LOWER EXTREMITIES: Status: RESOLVED | Noted: 2020-02-29 | Resolved: 2020-03-11

## 2020-03-11 PROCEDURE — 99232 SBSQ HOSP IP/OBS MODERATE 35: CPT | Performed by: NURSE PRACTITIONER

## 2020-03-11 RX ORDER — RISPERIDONE 2 MG/1
2 TABLET, FILM COATED ORAL DAILY
Status: DISCONTINUED | OUTPATIENT
Start: 2020-03-12 | End: 2020-03-14

## 2020-03-11 RX ORDER — RISPERIDONE 2 MG/1
4 TABLET, FILM COATED ORAL
Status: DISCONTINUED | OUTPATIENT
Start: 2020-03-11 | End: 2020-03-16 | Stop reason: HOSPADM

## 2020-03-11 RX ADMIN — ACETAMINOPHEN 975 MG: 325 TABLET ORAL at 12:51

## 2020-03-11 RX ADMIN — DIVALPROEX SODIUM 1000 MG: 500 TABLET, DELAYED RELEASE ORAL at 08:05

## 2020-03-11 RX ADMIN — RISPERIDONE 4 MG: 2 TABLET ORAL at 21:20

## 2020-03-11 RX ADMIN — BENZTROPINE MESYLATE 0.5 MG: 0.5 TABLET ORAL at 17:17

## 2020-03-11 RX ADMIN — DIVALPROEX SODIUM 1000 MG: 500 TABLET, DELAYED RELEASE ORAL at 17:17

## 2020-03-11 RX ADMIN — ALUMINUM HYDROXIDE, MAGNESIUM HYDROXIDE, AND SIMETHICONE 15 ML: 200; 200; 20 SUSPENSION ORAL at 16:38

## 2020-03-11 RX ADMIN — RISPERIDONE 3 MG: 2 TABLET ORAL at 08:04

## 2020-03-11 RX ADMIN — NICOTINE POLACRILEX 2 MG: 2 GUM, CHEWING BUCCAL at 13:32

## 2020-03-11 RX ADMIN — BENZTROPINE MESYLATE 0.5 MG: 0.5 TABLET ORAL at 08:05

## 2020-03-11 NOTE — PROGRESS NOTES
Patient reported feeling much better--no longer dizzy--and has been up and walking around in the milieu

## 2020-03-11 NOTE — PROGRESS NOTES
Pt presents as visible in public areas as well as bedroom  Currently denies SI, HI and A/V hallucinations  Compliant with meds  Observed as calm and cooperative, appears less manic, with improved insight  Asking meaningful questions about her treatment and acknowledging recent maladaptive behaviors  Content pertains to "missing her kids" and medication compliance after discharge  Was pleasant with nurse  Sloan nauseas during art group, mild amount of emesis  Decided to lay down and was administered maalox prn  Feels relief on reassessment

## 2020-03-11 NOTE — PROGRESS NOTES
Progress Note - Behavioral Health   Rosey Acosta 42 y o  female MRN: [id number]  Unit/Bed#: [facility] 347-01 Encounter: [id number]    Assessment/Plan   Principal Problem:    Bipolar I disorder, most recent episode manic, severe with psychotic features ([address] )  Active Problems:    Mild intermittent asthma without complication    Cannabis abuse, in remission    Medical clearance for psychiatric admission    Hypokalemia    Prediabetes    Pain in both lower extremities      Subjective:303- Patient was seen today for continuation of care, records reviewed and  patient was discussed with the morning case review team  Glendy Schaumburg presents calm, cooperative and pleasant  Today she denies dizziness and is more alert and speech is more rapid, still tangetial and clearer today  Reports having good appetite  Reports sleeping well  Expressed concern for still experiencing menses and was placed on the medical list to be further evaluated  She denies any suicidal thoughts, denies endorsing auditory visual hallucinations  Affect is bright and has been attending some groups  Patient denies endorsing any suicidal or homicidal ideation  Does not seem to be experiencing any manic or psychotic symptoms during our encounter  Remains medication compliance  Denies any side effects from medications      Psychiatric Review of Systems:    Sleep: normal  Appetite: normal  Medication side effects: No   ROS: no complaints, denies any headache, shortness of breath or chest pain, all other systems are negative    Vitals:  Vitals:    03/11/20 0710   BP: 136/77   Pulse: 72   Resp: 16   Temp: 98 3 °F (36 8 °C)   SpO2:        Mental Status Evaluation:    Appearance:  casually dressed, dressed appropriately, adequate grooming   Behavior:  pleasant, cooperative, calm, bizarre, good eye contact   Speech:  pressured, hypertalkative, soft, less tangential, less organized   Mood:  improved   Affect:  slightly brighter   Thought Process:  goal directed, less disorganized, less tangential   Associations: loose associations   Thought Content:  no overt delusions   Perceptual Disturbances: no auditory hallucinations, no visual hallucinations   Risk Potential: Suicidal ideation - None  Homicidal ideation - None  Potential for aggression - No   Sensorium:  oriented to person, place and time/date   Memory:  recent and remote memory grossly intact   Consciousness:  alert and awake   Attention: decreased concentration and decreased attention span   Insight:  partial   Judgment: partial   Gait/Station: normal gait/station, normal balance   Motor Activity: no abnormal movements     Laboratory results:    I have personally reviewed all pertinent laboratory/tests results  Last Laboratory Results with Depakote and/or Tegretol levels:   Lab Results   Component Value Date    WBC 7 00 03/07/2020    RBC 4 11 03/07/2020    HGB 13 3 03/07/2020    HCT 39 3 03/07/2020     03/07/2020    RDW 14 0 03/07/2020    NEUTROABS 3 30 03/07/2020    SODIUM 140 03/07/2020    K 3 7 03/07/2020     03/07/2020    CO2 34 (H) 03/07/2020    BUN 11 03/07/2020    CREATININE 0 53 (L) 03/07/2020    GLUC 82 03/07/2020    GLUF 82 03/07/2020    CALCIUM 8 6 03/07/2020    AST 15 03/07/2020    ALT 15 03/07/2020    ALKPHOS 48 03/07/2020    TP 6 8 03/07/2020    ALB 3 6 03/07/2020    TBILI 0 20 03/07/2020    VALPROICTOT 81 8 03/07/2020       Progress Toward Goals:     Improving     Recommended Treatment:     All current active medications have been reviewed  Encourage group therapy, milieu therapy and occupational therapy  Behavioral Health checks every 7 minutes  Change Risperidone dose to 2 mg in the am and 4 mg at HS    Check Depakote level tomorrow and in the morning  Continue all other medications:      Current Facility-Administered Medications:  acetaminophen 650 mg Oral Q6H PRN Franchesca Dominguez MD   acetaminophen 975 mg Oral Q6H PRN Franchesca Dominguez MD   albuterol 2 puff Inhalation Q6H PRN Mandeep Mulligan Lynette Ambrocio MD   aluminum-magnesium hydroxide-simethicone 15 mL Oral Q4H PRN Yvette Griffin MD   benztropine 1 mg Intramuscular Q6H PRN Yvette Griffin MD   benztropine 0 5 mg Oral BID Yvette Griffin MD   benztropine 1 mg Oral Q6H PRN Yvette Griffin MD   divalproex sodium 1,000 mg Oral QPM Yvette Griffin MD   divalproex sodium 1,000 mg Oral Daily Yvette Griffin MD   haloperidol 5 mg Oral Q6H PRN Yvette Griffin MD   haloperidol lactate 5 mg Intramuscular Q6H PRN Yvette Griffin MD   hydrOXYzine HCL 25 mg Oral Q4H PRN Lenka Sean, DO   hydrOXYzine HCL 25 mg Oral Q6H PRN Yvette Griffin MD   hydrOXYzine HCL 50 mg Oral Q6H PRN Yvette Griffin MD   hydrOXYzine HCL 75 mg Oral Q6H PRN Yvette Griffin MD   ibuprofen 600 mg Oral Q6H PRN Yvette Griffin MD   influenza vaccine 0 5 mL Intramuscular Once Franck Juares MD   LORazepam 1 mg Intramuscular Q6H PRN Yvette Griffin MD   magnesium hydroxide 30 mL Oral Daily PRN Yvette Griffin MD   nicotine polacrilex 2 mg Oral Q2H PRN Yvette Griffin MD   OLANZapine 10 mg Intramuscular Q3H PRN Yvette Griffin MD   OLANZapine 5 mg Oral Q3H PRN Lenka Sean, DO   risperiDONE 1 mg Oral Q4H PRN Yvette Griffin MD   [START ON 3/12/2020] risperiDONE 2 mg Oral Daily FRANCISCA Mckenzie   risperiDONE 4 mg Oral HS FRANCISCA Mckenzie   traZODone 50 mg Oral HS PRN Thorlobo half-way, DO       Risks / Benefits of Treatment:     Risks, benefits, and possible side effects of medications explained to patient and patient verbalizes understanding and agreement for treatment  Counseling / Coordination of Care:     Patient's progress reviewed with nursing staff  Medications, treatment progress and treatment plan reviewed with patient  Supportive counseling provided to the patient            Guru Gonsalves

## 2020-03-11 NOTE — ASSESSMENT & PLAN NOTE
Heavy and prolonged menstrual periods since February, 2020  Has never noticed similar episodes before  Has required change of many pads per day  This month, the amount of bleeding has been decreasing with minimal spotting present  Denies any other symptoms associated  Upon examination, mild tenderness upon palpation of the suprapubic region  Rest of examination unremarkable  At this time, appears as her current period cycle is ending  Will monitor patient during the next days and if the symptoms persists or worsens will consider further workup

## 2020-03-11 NOTE — PROGRESS NOTES
Progress Note    Jose D Stevens 42 y o  female MRN: [id number]  [facility] Encounter: [id number]  Admitting Physician: Sandra Monroy MD  PCP: Stephani Esposito MD  Date of Admission:  2/27/2020 10:28 PM    Assessment and Plan    Heavy menstrual period  Assessment & Plan  Heavy and prolonged menstrual periods since February, 2020  Has never noticed similar episodes before  Has required change of many pads per day  This month, the amount of bleeding has been decreasing with minimal spotting present  Denies any other symptoms associated  Upon examination, mild tenderness upon palpation of the suprapubic region  Rest of examination unremarkable  At this time, appears as her current period cycle is ending  Will monitor patient during the next days and if the symptoms persists or worsens will consider further workup  Prediabetes  Assessment & Plan  HgbA1c on admission noted to be 6 0   - Diet and exercise counseling  Hypokalemia  Assessment & Plan  Stable  Potassium noted to be 3 2 on admission  Replaced with Potassium Chloride 40 mEq with appropriate response, today Potassium of 3 7  Mild intermittent asthma without complication  Assessment & Plan  Albuterol rescue inhaler, 2 puff q6h PRN  - Continue home medication    Pain in both lower extremitiesresolved as of 3/11/2020  Assessment & Plan  Symptoms of left calf tenderness on initial evaluation with negative Winsome' sign  No new symptoms reported  PERC score is 0 and patient is low risk for DVT  Lower extremity ultrasound done on 3/2/2020 negative for acute or chronic DVT  - Tylenol for pain control - patient agreeable with plan  VTE Pharmacologic Prophylaxis:   Pharmacologic: Pharmacologic VTE Prophylaxis contraindicated due to patient ambulating  Mechanical VTE Prophylaxis in Place: No      Education and Discussions with Family / Patient: yes, patient    Time Spent for Care: 15 minutes    More than 50% of total time spent on counseling and coordination of care as described above  Current Length of Stay: 13 day(s)    Current Patient Status: Inpatient Psych       Code Status: Level 1 - Full Code      Subjective:     Patient evaluated today, alert and awake  She indicates to have noticed prolonged heavy periods since  with use of multiple pads a day  This month, the menstrual flow has been decreasing and has noted spotting for these past few days  Denies any other additional symptoms  Objective:     Vitals:   Temp (24hrs), Av 2 °F (36 8 °C), Min:98 °F (36 7 °C), Max:98 3 °F (36 8 °C)    Temp:  [98 °F (36 7 °C)-98 3 °F (36 8 °C)] 98 °F (36 7 °C)  HR:  [72-85] 85  Resp:  [16] 16  BP: (120-136)/(68-77) 120/68  Body mass index is 25 23 kg/m²  Input and Output Summary (last 24 hours):     No intake or output data in the 24 hours ending 20 1722    Physical Exam:     Physical Exam   Constitutional: She is oriented to person, place, and time  She appears well-developed and well-nourished  No distress  HENT:   Head: Atraumatic  Eyes: EOM are normal    Neck: Neck supple  Cardiovascular: Normal rate and normal heart sounds  No murmur heard  Pulmonary/Chest: Effort normal and breath sounds normal  No respiratory distress  She has no wheezes  Abdominal: Soft  Bowel sounds are normal  She exhibits no distension  There is tenderness ( mild suprapubic tenderness upon palpation)  Musculoskeletal: Normal range of motion  She exhibits no edema, tenderness or deformity  Neurological: She is alert and oriented to person, place, and time  Skin: Skin is warm  No rash noted  She is not diaphoretic  No erythema  No pallor         Additional Data:     Labs:    Results from last 7 days   Lab Units 20  0613   WBC Thousand/uL 7 00   HEMOGLOBIN g/dL 13 3   HEMATOCRIT % 39 3   PLATELETS Thousands/uL 254   NEUTROS PCT % 47   LYMPHS PCT % 44   MONOS PCT % 6   EOS PCT % 3     Results from last 7 days   Lab Units 03/07/20  0613   POTASSIUM mmol/L 3 7   CHLORIDE mmol/L 104   CO2 mmol/L 34*   BUN mg/dL 11   CREATININE mg/dL 0 53*   CALCIUM mg/dL 8 6   ALK PHOS U/L 48   ALT U/L 15   AST U/L 15                     * I Have Reviewed All Lab Data Listed Above  * Additional Pertinent Lab Tests Reviewed:  All Labs Within Last 24 Hours Reviewed      Recent Cultures (last 7 days):           Last 24 Hours Medication List:     Current Facility-Administered Medications:  acetaminophen 650 mg Oral Q6H PRN Franchesca Dominguez MD   acetaminophen 975 mg Oral Q6H PRN Franchesca Dominguez MD   albuterol 2 puff Inhalation Q6H PRN Franchesca Dominguez MD   aluminum-magnesium hydroxide-simethicone 15 mL Oral Q4H PRN Franchesca Dominguez MD   benztropine 1 mg Intramuscular Q6H PRN Franchesca Dominguez MD   benztropine 0 5 mg Oral BID Franchesca Dominguez MD   benztropine 1 mg Oral Q6H PRN Franchesca Dominguez MD   divalproex sodium 1,000 mg Oral QPM Franchesca Dominguez MD   divalproex sodium 1,000 mg Oral Daily Franchesca Dominguez MD   haloperidol 5 mg Oral Q6H PRN Franchesca Dominguez MD   haloperidol lactate 5 mg Intramuscular Q6H PRN Franchesca Dominguez MD   hydrOXYzine HCL 25 mg Oral Q4H PRN Ronald Lana, DO   hydrOXYzine HCL 25 mg Oral Q6H PRN Franchesca Dominguez MD   hydrOXYzine HCL 50 mg Oral Q6H PRN Franchesca Dominguez MD   hydrOXYzine HCL 75 mg Oral Q6H PRN Franchesca Dominguez MD   ibuprofen 600 mg Oral Q6H PRN Franchesca Dominguez MD   influenza vaccine 0 5 mL Intramuscular Once Sulma Millan MD   LORazepam 1 mg Intramuscular Q6H PRN Franchesca Dominguez MD   magnesium hydroxide 30 mL Oral Daily PRN Franchesca Dominguez MD   nicotine polacrilex 2 mg Oral Q2H PRN Franchesca Dominguez MD   OLANZapine 10 mg Intramuscular Q3H PRN Franchesca Dominguez MD   OLANZapine 5 mg Oral Q3H PRN Ronald Lana, DO   risperiDONE 1 mg Oral Q4H PRN Franchesca Dominguez MD   [START ON 3/12/2020] risperiDONE 2 mg Oral Daily FRANCISCA Mckenzie   risperiDONE 4 mg Oral HS Domonique ROWLAND FRANCISCA Lozoya   traZODone 50 mg Oral HS PRN Alejandra Ni DO        ** Please Note: Dictation voice to text software may have been used in the creation of this document   **    Rishabh Kinney MD  03/11/20  5:22 PM

## 2020-03-11 NOTE — PROGRESS NOTES
Patient was cooperative with medications and subdued but brighter than yesterday in conversation  Was overheard talking sweetly to her children on the phone; said she is "wonderful" because she said her  and children are all doing well  Said she has had her menses for multiple weeks and that her last one lasted an entire month--said she has never taken birth control but is interested in starting to help regulate this  Patient was put on the medical list  Patient initially denied other symptoms and needs but about 30 minutes after taking her medications came up to the nurse's station reporting sudden dizziness  Patient encouraged to sit down, finish her breakfast, and hydrate  Will continue to monitor

## 2020-03-11 NOTE — TREATMENT TEAM
03/11/20 0700   Team Meeting   Meeting Type Daily Rounds   Team Members Present   Team Members Present Physician;Nurse;;; Other (Discipline and Name)   Physician Team Member [address] Team Member Carlos   Care Management Team Member Contreras Yang   Other (Discipline and Name) Nik Carreno; students   Patient/Family Present   Patient Present No   Patient's Family Present No   Will continue to monitor

## 2020-03-11 NOTE — CASE MANAGEMENT
Pt was pleasant, waving at SW  Pt is less irritable and more positive when speaking to SW   Pt made no paranoid statements and did not appear suspicious of SW speaking to her, as she did in the past

## 2020-03-12 LAB
ALBUMIN SERPL BCP-MCNC: 2.9 G/DL (ref 3–5.2)
ALP SERPL-CCNC: 50 U/L (ref 43–122)
ALT SERPL W P-5'-P-CCNC: 17 U/L (ref 9–52)
ANION GAP SERPL CALCULATED.3IONS-SCNC: 2 MMOL/L (ref 5–14)
AST SERPL W P-5'-P-CCNC: 14 U/L (ref 14–36)
BASOPHILS # BLD AUTO: 0 THOUSANDS/ΜL (ref 0–0.1)
BASOPHILS NFR BLD AUTO: 1 % (ref 0–1)
BILIRUB SERPL-MCNC: <0.1 MG/DL
BUN SERPL-MCNC: 8 MG/DL (ref 5–25)
CALCIUM SERPL-MCNC: 8.1 MG/DL (ref 8.4–10.2)
CHLORIDE SERPL-SCNC: 105 MMOL/L (ref 97–108)
CO2 SERPL-SCNC: 32 MMOL/L (ref 22–30)
CREAT SERPL-MCNC: 0.52 MG/DL (ref 0.6–1.2)
EOSINOPHIL # BLD AUTO: 0.1 THOUSAND/ΜL (ref 0–0.4)
EOSINOPHIL NFR BLD AUTO: 1 % (ref 0–6)
ERYTHROCYTE [DISTWIDTH] IN BLOOD BY AUTOMATED COUNT: 14.4 %
FERRITIN SERPL-MCNC: 34 NG/ML (ref 8–388)
GFR SERPL CREATININE-BSD FRML MDRD: 141 ML/MIN/1.73SQ M
GLUCOSE P FAST SERPL-MCNC: 91 MG/DL (ref 70–99)
GLUCOSE SERPL-MCNC: 91 MG/DL (ref 70–99)
HCT VFR BLD AUTO: 35.4 % (ref 36–46)
HGB BLD-MCNC: 11.7 G/DL (ref 12–16)
IRON SATN MFR SERPL: 15 %
IRON SERPL-MCNC: 44 UG/DL (ref 50–170)
LYMPHOCYTES # BLD AUTO: 2.8 THOUSANDS/ΜL (ref 0.5–4)
LYMPHOCYTES NFR BLD AUTO: 40 % (ref 25–45)
MCH RBC QN AUTO: 31.4 PG (ref 26–34)
MCHC RBC AUTO-ENTMCNC: 33 G/DL (ref 31–36)
MCV RBC AUTO: 95 FL (ref 80–100)
MONOCYTES # BLD AUTO: 0.7 THOUSAND/ΜL (ref 0.2–0.9)
MONOCYTES NFR BLD AUTO: 9 % (ref 1–10)
NEUTROPHILS # BLD AUTO: 3.5 THOUSANDS/ΜL (ref 1.8–7.8)
NEUTS SEG NFR BLD AUTO: 49 % (ref 45–65)
PLATELET # BLD AUTO: 231 THOUSANDS/UL (ref 150–450)
PMV BLD AUTO: 9.6 FL (ref 8.9–12.7)
POTASSIUM SERPL-SCNC: 3.7 MMOL/L (ref 3.6–5)
PROT SERPL-MCNC: 5.7 G/DL (ref 5.9–8.4)
RBC # BLD AUTO: 3.72 MILLION/UL (ref 4–5.2)
SODIUM SERPL-SCNC: 139 MMOL/L (ref 137–147)
TIBC SERPL-MCNC: 297 UG/DL (ref 250–450)
VALPROATE SERPL-MCNC: 103.4 UG/ML (ref 50–120)
WBC # BLD AUTO: 7.1 THOUSAND/UL (ref 4.5–11)

## 2020-03-12 PROCEDURE — 85025 COMPLETE CBC W/AUTO DIFF WBC: CPT | Performed by: PSYCHIATRY & NEUROLOGY

## 2020-03-12 PROCEDURE — 80164 ASSAY DIPROPYLACETIC ACD TOT: CPT | Performed by: PSYCHIATRY & NEUROLOGY

## 2020-03-12 PROCEDURE — 99232 SBSQ HOSP IP/OBS MODERATE 35: CPT | Performed by: NURSE PRACTITIONER

## 2020-03-12 PROCEDURE — 82728 ASSAY OF FERRITIN: CPT | Performed by: NURSE PRACTITIONER

## 2020-03-12 PROCEDURE — 83550 IRON BINDING TEST: CPT | Performed by: NURSE PRACTITIONER

## 2020-03-12 PROCEDURE — 83540 ASSAY OF IRON: CPT | Performed by: NURSE PRACTITIONER

## 2020-03-12 PROCEDURE — 99232 SBSQ HOSP IP/OBS MODERATE 35: CPT | Performed by: FAMILY MEDICINE

## 2020-03-12 PROCEDURE — 80053 COMPREHEN METABOLIC PANEL: CPT | Performed by: PSYCHIATRY & NEUROLOGY

## 2020-03-12 RX ORDER — FERROUS SULFATE 325(65) MG
325 TABLET ORAL
Status: DISCONTINUED | OUTPATIENT
Start: 2020-03-12 | End: 2020-03-16 | Stop reason: HOSPADM

## 2020-03-12 RX ADMIN — BENZTROPINE MESYLATE 0.5 MG: 0.5 TABLET ORAL at 08:22

## 2020-03-12 RX ADMIN — NICOTINE POLACRILEX 2 MG: 2 GUM, CHEWING BUCCAL at 08:51

## 2020-03-12 RX ADMIN — FERROUS SULFATE TAB 325 MG (65 MG ELEMENTAL FE) 325 MG: 325 (65 FE) TAB at 11:59

## 2020-03-12 RX ADMIN — NICOTINE POLACRILEX 2 MG: 2 GUM, CHEWING BUCCAL at 11:59

## 2020-03-12 RX ADMIN — MAGNESIUM HYDROXIDE 30 ML: 400 SUSPENSION ORAL at 18:49

## 2020-03-12 RX ADMIN — NICOTINE POLACRILEX 2 MG: 2 GUM, CHEWING BUCCAL at 16:21

## 2020-03-12 RX ADMIN — RISPERIDONE 2 MG: 2 TABLET ORAL at 08:22

## 2020-03-12 RX ADMIN — DIVALPROEX SODIUM 1000 MG: 500 TABLET, DELAYED RELEASE ORAL at 08:22

## 2020-03-12 RX ADMIN — NICOTINE POLACRILEX 2 MG: 2 GUM, CHEWING BUCCAL at 18:29

## 2020-03-12 RX ADMIN — ACETAMINOPHEN 975 MG: 325 TABLET ORAL at 11:59

## 2020-03-12 RX ADMIN — BENZTROPINE MESYLATE 0.5 MG: 0.5 TABLET ORAL at 17:15

## 2020-03-12 RX ADMIN — IBUPROFEN 600 MG: 600 TABLET ORAL at 23:04

## 2020-03-12 RX ADMIN — NICOTINE POLACRILEX 2 MG: 2 GUM, CHEWING BUCCAL at 21:03

## 2020-03-12 RX ADMIN — DIVALPROEX SODIUM 1000 MG: 500 TABLET, DELAYED RELEASE ORAL at 17:15

## 2020-03-12 RX ADMIN — RISPERIDONE 4 MG: 2 TABLET ORAL at 21:03

## 2020-03-12 NOTE — TREATMENT TEAM
PINA GROUP NOTE     03/12/20 0930   Activity/Group Checklist   Group Community meeting   Attendance Attended   Attendance Duration (min) 0-15   Interactions Interacted appropriately   Affect/Mood Appropriate   Goals Achieved Identified feelings; Able to listen to others; Able to engage in interactions

## 2020-03-12 NOTE — PROGRESS NOTES
Progress Note - Behavioral Health   Pattie Voss 42 y o  female MRN: [id number]  Unit/Bed#: [facility] 347-01 Encounter: [id number]    Assessment/Plan   Principal Problem:    Bipolar I disorder, most recent episode manic, severe with psychotic features ([address] )  Active Problems:    Mild intermittent asthma without complication    Cannabis abuse, in remission    Medical clearance for psychiatric admission    Hypokalemia    Prediabetes    Heavy menstrual period      Subjective:303- Patient was seen today for continuation of care, records reviewed and  patient was discussed with the morning case review team  Doni Gould presented calm, cooperative and pleasant  He reported feeling nauseated and was encouraged by primary nurse to take medications with food  He has been visible in the milieu and has been dancing frequently  Denies any suicidal thoughts  Reports she slept "amazing"  Has good appetite, has been attending ADLs and groups  Describes mood as "very pleasant"  Patient denies endorsing any suicidal or homicidal ideation  Denies endorsing auditory visual hallucination  Mood has been bright, is still elated but speech is more organized,paused and clear  Remains medication compliance  Denies any side effects from medications   4       Psychiatric Review of Systems:    Sleep: normal  Appetite: normal  Medication side effects: No   ROS: no complaints, denies any headache, shortness of breath or chest pain, all other systems are negative    Vitals:  Vitals:    03/12/20 0721   BP: 118/73   Pulse: 84   Resp: 16   Temp: 98 4 °F (36 9 °C)   SpO2:        Mental Status Evaluation:    Appearance:  casually dressed, dressed appropriately   Behavior:  pleasant, cooperative, calm, good eye contact   Speech:  normal rate and volume, fluent, clear, coherent   Mood:  euthymic   Affect:  appropriate, mood-congruent   Thought Process:  organized, coherent, goal directed   Associations: circumstantial associations   Thought Content: no overt delusions   Perceptual Disturbances: no auditory hallucinations, no visual hallucinations   Risk Potential: Suicidal ideation - None  Homicidal ideation - None  Potential for aggression - No   Sensorium:  oriented to person, place, time/date and situation   Memory:  recent and remote memory grossly intact   Consciousness:  alert and awake   Attention: attention span and concentration are improving   Insight:  improving   Judgment: improving   Gait/Station: normal gait/station, normal balance   Motor Activity: no abnormal movements     Laboratory results:    I have personally reviewed all pertinent laboratory/tests results    Most Recent Labs:   Lab Results   Component Value Date    WBC 7 10 03/12/2020    RBC 3 72 (L) 03/12/2020    HGB 11 7 (L) 03/12/2020    HCT 35 4 (L) 03/12/2020     03/12/2020    RDW 14 4 03/12/2020    NEUTROABS 3 50 03/12/2020    SODIUM 139 03/12/2020    K 3 7 03/12/2020     03/12/2020    CO2 32 (H) 03/12/2020    BUN 8 03/12/2020    CREATININE 0 52 (L) 03/12/2020    GLUC 91 03/12/2020    GLUF 91 03/12/2020    CALCIUM 8 1 (L) 03/12/2020    AST 14 03/12/2020    ALT 17 03/12/2020    ALKPHOS 50 03/12/2020    TP 5 7 (L) 03/12/2020    ALB 2 9 (L) 03/12/2020    TBILI <0 10 03/12/2020    CHOLESTEROL 169 02/28/2020    HDL 53 02/28/2020    TRIG 72 02/28/2020    LDLCALC 102 02/28/2020    NONHDLC 116 02/28/2020    VALPROICTOT 103 4 03/12/2020    XJC4SWTZDKNM 1 260 02/28/2020    PREGSERUM Negative 02/28/2020    RPR Non-Reactive 02/28/2020    HGBA1C 6 0 (H) 02/28/2020     02/28/2020       Progress Toward Goals:     Improving     Recommended Treatment:     All current active medications have been reviewed  Encourage group therapy, milieu therapy and occupational therapy  Behavioral Health checks every 7 minutes  On 303 commitment     Start Iron pill today for low RBC  Ferritin panel ordered  Labs ordered for Monday  D/C planned for Tuesday     Current Facility-Administered Medications:  acetaminophen 650 mg Oral Q6H PRN Ingrid Vazquez MD   acetaminophen 975 mg Oral Q6H PRN Ingrid Vazquez MD   albuterol 2 puff Inhalation Q6H PRN Ingrid Vazquez MD   aluminum-magnesium hydroxide-simethicone 15 mL Oral Q4H PRN Ingrid Vazquez MD   benztropine 1 mg Intramuscular Q6H PRN Ingrid Vazquez MD   benztropine 0 5 mg Oral BID Ingrid Vazquez MD   benztropine 1 mg Oral Q6H PRN Ingrid Vazquez MD   divalproex sodium 1,000 mg Oral QPM Ingrid Vazquez MD   divalproex sodium 1,000 mg Oral Daily Ingrid Vazquez MD   haloperidol 5 mg Oral Q6H PRN Ignrid Vazquez MD   haloperidol lactate 5 mg Intramuscular Q6H PRN Ingrid Vazquez MD   hydrOXYzine HCL 25 mg Oral Q4H PRN Alfredo Barber, DO   hydrOXYzine HCL 25 mg Oral Q6H PRN Ingrid Vazquez MD   hydrOXYzine HCL 50 mg Oral Q6H PRN Ingrid Vazquez MD   hydrOXYzine HCL 75 mg Oral Q6H PRN Ingrid Vazquez MD   ibuprofen 600 mg Oral Q6H PRN Ingrid Vazquez MD   influenza vaccine 0 5 mL Intramuscular Once Ronald Armstrong MD   LORazepam 1 mg Intramuscular Q6H PRN Ingrid Vazquez MD   magnesium hydroxide 30 mL Oral Daily PRN Ingrid Vazquez MD   nicotine polacrilex 2 mg Oral Q2H PRN Ingrid Vazquez MD   OLANZapine 10 mg Intramuscular Q3H PRN Ingrid Vazquez MD   OLANZapine 5 mg Oral Q3H PRN Alfredo Barber, DO   risperiDONE 1 mg Oral Q4H PRN Ingrid Vazquez MD   risperiDONE 2 mg Oral Daily FRANCISCA Mckenzie   risperiDONE 4 mg Oral HS FRANCISCA Mckenzie   traZODone 50 mg Oral HS PRN Alfredodee Barber, DO       Risks / Benefits of Treatment:     Risks, benefits, and possible side effects of medications explained to patient and patient verbalizes understanding and agreement for treatment  Counseling / Coordination of Care:     Patient's progress reviewed with nursing staff  Medications, treatment progress and treatment plan reviewed with patient    Supportive counseling provided to the patient            Brianda Harvey

## 2020-03-12 NOTE — TREATMENT TEAM
03/11/20 1415   Activity/Group Checklist   Group Other (Comment)  (Art Therapy Group/Ongoing Artwork, Process Discussion)   Attendance Attended   Attendance Duration (min) Greater than 60  (Patient left group before discussion; avoidant)   Interactions Interacted appropriately   Affect/Mood Appropriate   Goals Achieved Able to listen to others; Able to engage in interactions; Able to recieve feedback; Able to give feedback to another  (Able to engage materials; limited participation)

## 2020-03-12 NOTE — CASE MANAGEMENT
DANYA left message for PT  in regards to visiting Pt this weekend and reporting on Pt status as he sees it  SW requested return call   Ewkori [phone]

## 2020-03-12 NOTE — TREATMENT TEAM
[facility] GROUP NOTE  Pt identified a situation where her family lied as being her main source of anger  Pt stated that she would lock herself in the study to drink and smoke to get away from them, but did not specify what she thought they were lying about  Demonstrated understanding that some of these coping skills may have negative results  03/12/20 1030   Activity/Group Checklist   Group Anger management   Attendance Attended   Attendance Duration (min) 46-60   Interactions Interacted appropriately   Affect/Mood Appropriate   Goals Achieved Identified feelings; Identified triggers; Able to listen to others; Able to engage in interactions; Able to reflect/comment on own behavior;Able to self-disclose

## 2020-03-12 NOTE — PROGRESS NOTES
Patient about the unit  No inappropriate behaviors  Interacting appropriately with peers  Denies SI  Asking when she will be discharged  States that she is sleeping well  Also states that she has no nausea after taking her medications this morning and feels that it is because she took her medications on a full stomach

## 2020-03-12 NOTE — PROGRESS NOTES
Patient is out and social on the unit  She interacts well with her peers, and no inappropriate behaviors were displayed  Patient denies SI, and has been talking about her  coming in to see her to talk about discharging next week  She has been medication complaint and did have complaints of constipation  She received a PRN of milk of magnesia to assist with the constipation  Patient stated that the PRN was effective  No other complaints were noted for the shift

## 2020-03-12 NOTE — TREATMENT TEAM
03/12/20 0743   Team Meeting   Meeting Type Daily Rounds   Team Members Present   Team Members Present Physician;Nurse;; Other (Discipline and Name)   Physician Team Member Pablo   Nursing Team Member ALISON MCLEOD [facility] Management Team Member Loreto Ponce   Other (Discipline and Name) Raúl Nolen NP, medical student   Patient/Family Present   Patient Present No   Patient's Family Present No     Possible d/c next Tuesday  CM will call  to come in and visit and to see how he thinks he is doing

## 2020-03-13 PROCEDURE — 99232 SBSQ HOSP IP/OBS MODERATE 35: CPT | Performed by: NURSE PRACTITIONER

## 2020-03-13 RX ADMIN — BENZTROPINE MESYLATE 0.5 MG: 0.5 TABLET ORAL at 08:40

## 2020-03-13 RX ADMIN — NICOTINE POLACRILEX 2 MG: 2 GUM, CHEWING BUCCAL at 16:44

## 2020-03-13 RX ADMIN — FERROUS SULFATE TAB 325 MG (65 MG ELEMENTAL FE) 325 MG: 325 (65 FE) TAB at 06:33

## 2020-03-13 RX ADMIN — RISPERIDONE 4 MG: 2 TABLET ORAL at 21:11

## 2020-03-13 RX ADMIN — NICOTINE POLACRILEX 2 MG: 2 GUM, CHEWING BUCCAL at 09:10

## 2020-03-13 RX ADMIN — DIVALPROEX SODIUM 1000 MG: 500 TABLET, DELAYED RELEASE ORAL at 17:38

## 2020-03-13 RX ADMIN — NICOTINE POLACRILEX 2 MG: 2 GUM, CHEWING BUCCAL at 13:10

## 2020-03-13 RX ADMIN — DIVALPROEX SODIUM 1000 MG: 500 TABLET, DELAYED RELEASE ORAL at 08:40

## 2020-03-13 RX ADMIN — BENZTROPINE MESYLATE 0.5 MG: 0.5 TABLET ORAL at 17:38

## 2020-03-13 RX ADMIN — Medication 1 TABLET: at 17:40

## 2020-03-13 RX ADMIN — RISPERIDONE 2 MG: 2 TABLET ORAL at 08:40

## 2020-03-13 NOTE — CASE MANAGEMENT
Nena Scales (Spouse)  [phone]   DANYA spoke with Pt   Pt  stated he believes Pt is doing much better, although he does not believe she is 100% yet  DANYA explained tentative DC next week and husbanded stated Pt can return to residence and he will ensure that Pt changes her insurance to [facility] so she is able to receive injectable   Pt  is able to transport Pt upon discharge

## 2020-03-13 NOTE — TREATMENT TEAM
03/13/20 0700   Team Meeting   Meeting Type Daily Rounds   Team Members Present   Team Members Present Physician;Nurse;;; Other (Discipline and Name)   Physician Team Member [facility]   Nursing Team Member Jefry   Care Management Team Member Lorna Lucero and [address] [facility] Work Team Member Nourse   Other (Discipline and Name) [place], NP, med students   Patient/Family Present   Patient Present No   Patient's Family Present No    to come in over the weekend to assess if patient is at baseline

## 2020-03-13 NOTE — PROGRESS NOTES
Patient out on unit, pleasant and bright  Patient is excited to see her  this weekend and possible discharge next week  Patient denies SI/HI and A/V  Patient is medication compliant

## 2020-03-13 NOTE — TREATMENT TEAM
03/12/20 1415   Activity/Group Checklist   Group Other (Comment)  (Art Therapy Group/Bilaterial Stimulation, Process Discussion)   Attendance Attended   Attendance Duration (min) Greater than 60   Interactions Interacted appropriately   Affect/Mood Appropriate   Goals Achieved Able to listen to others; Able to engage in interactions; Able to recieve feedback; Able to give feedback to another  (Able to engage materials; full participation)

## 2020-03-13 NOTE — TREATMENT TEAM
03/13/20 1415   Activity/Group Checklist   Group Other (Comment)  (Art Therapy Group/Open Choice, Open Discussion)   Attendance Attended   Attendance Duration (min) Greater than 60   Interactions Interacted appropriately   Affect/Mood Appropriate   Goals Achieved Able to listen to others; Able to engage in interactions; Able to recieve feedback; Able to give feedback to another  (Able to engage materials; full participation)

## 2020-03-13 NOTE — PROGRESS NOTES
Progress Note - Behavioral Health   Jessenia Rubio 42 y o  female MRN: [id number]  Unit/Bed#: [facility] 347-01 Encounter: [id number]    Assessment/Plan   Principal Problem:    Bipolar I disorder, most recent episode manic, severe with psychotic features ([address] )  Active Problems:    Mild intermittent asthma without complication    Cannabis abuse, in remission    Medical clearance for psychiatric admission    Hypokalemia    Prediabetes    Heavy menstrual period      Subjective:303- Patient was seen today for continuation of care, records reviewed and  patient was discussed with the morning case review team  Anastacia French presented calm,cooperative and pleasant  Describes her mood as "excited"  Speech is still tangential but more paused and coherent  Was noted to be disorganized at times  Denies any suicidal thoughts, denies any auditory and visual hallucinations  Expressed concerns about her low calcium and low iron  Agreeable with multivitamins and iron supplements  Has been attending groups  Denies any suicidal thoughts , denies endorsing any auditory or visual hallucinations  Looks for a meeting with tawny and [email or web address]  Denies any anxiety and depression symptoms  Reports sleeping well and has increased appetite  Patient denies endorsing any suicidal or homicidal ideation  Remains medication compliance  Denies any side effects from medications      Psychiatric Review of Systems:    Sleep: normal  Appetite: increased  Medication side effects: No   ROS: no complaints, denies any headache, shortness of breath or chest pain, all other systems are negative    Vitals:  Vitals:    03/13/20 0719   BP: 126/78   Pulse: 84   Resp: 16   Temp: 98 2 °F (36 8 °C)   SpO2:        Mental Status Evaluation:    Appearance:  casually dressed, dressed appropriately   Behavior:  pleasant, cooperative, calm, good eye contact   Speech:  normal rate and volume, fluent, clear, soft, less tangential   Mood:  improved   Affect:  appropriate, slightly brighter Thought Process:  coherent, goal directed, racing of thoughts, less tangential   Associations: circumstantial associations   Thought Content:  no overt delusions   Perceptual Disturbances: no auditory hallucinations, no visual hallucinations   Risk Potential: Suicidal ideation - None  Homicidal ideation - None  Potential for aggression - No   Sensorium:  oriented to person, place, time/date and situation   Memory:  recent and remote memory grossly intact   Consciousness:  alert and awake   Attention: attention span and concentration are improving   Insight:  improving   Judgment: improving   Gait/Station: normal gait/station, normal balance   Motor Activity: no abnormal movements     Laboratory results:    I have personally reviewed all pertinent laboratory/tests results    Most Recent Labs:   Lab Results   Component Value Date    WBC 7 10 03/12/2020    RBC 3 72 (L) 03/12/2020    HGB 11 7 (L) 03/12/2020    HCT 35 4 (L) 03/12/2020     03/12/2020    RDW 14 4 03/12/2020    NEUTROABS 3 50 03/12/2020    SODIUM 139 03/12/2020    K 3 7 03/12/2020     03/12/2020    CO2 32 (H) 03/12/2020    BUN 8 03/12/2020    CREATININE 0 52 (L) 03/12/2020    GLUC 91 03/12/2020    GLUF 91 03/12/2020    CALCIUM 8 1 (L) 03/12/2020    AST 14 03/12/2020    ALT 17 03/12/2020    ALKPHOS 50 03/12/2020    TP 5 7 (L) 03/12/2020    ALB 2 9 (L) 03/12/2020    TBILI <0 10 03/12/2020    CHOLESTEROL 169 02/28/2020    HDL 53 02/28/2020    TRIG 72 02/28/2020    LDLCALC 102 02/28/2020    NONHDLC 116 02/28/2020    VALPROICTOT 103 4 03/12/2020    UFA4XDRYMQMG 1 260 02/28/2020    PREGSERUM Negative 02/28/2020    RPR Non-Reactive 02/28/2020    HGBA1C 6 0 (H) 02/28/2020     02/28/2020       Progress Toward Goals:     Improving     Recommended Treatment:     All current active medications have been reviewed  Encourage group therapy, milieu therapy and occupational therapy  Behavioral Health checks every 7 minutes  On 303 commitment  Family meeting with wendi pending   Labs pending for Monday am  D/C for Tuesday    Continue current medications:    Current Facility-Administered Medications:  acetaminophen 650 mg Oral Q6H PRN James Buchanan MD   acetaminophen 975 mg Oral Q6H PRN James Buchanan MD   albuterol 2 puff Inhalation Q6H PRN James Buchanan MD   aluminum-magnesium hydroxide-simethicone 15 mL Oral Q4H PRN James Buchanan MD   benztropine 1 mg Intramuscular Q6H PRN James Buchanan MD   benztropine 0 5 mg Oral BID James Buchanan MD   benztropine 1 mg Oral Q6H PRN James Buchanan MD   divalproex sodium 1,000 mg Oral QPM James Buchanan MD   divalproex sodium 1,000 mg Oral Daily James Buchanan MD   ferrous sulfate 325 mg Oral Daily With Breakfast Domonique Lozoya, CRNP   haloperidol 5 mg Oral Q6H PRN James Buchanan MD   haloperidol lactate 5 mg Intramuscular Q6H PRN James Buchanan MD   hydrOXYzine HCL 25 mg Oral Q4H PRN Homar Lakhani, DO   hydrOXYzine HCL 25 mg Oral Q6H PRN James Buchanan MD   hydrOXYzine HCL 50 mg Oral Q6H PRN James Buchanan MD   hydrOXYzine HCL 75 mg Oral Q6H PRN James Buchanan MD   ibuprofen 600 mg Oral Q6H PRN James Buchanan MD   influenza vaccine 0 5 mL Intramuscular Once Sammy Vee MD   LORazepam 1 mg Intramuscular Q6H PRN James Buchanan MD   magnesium hydroxide 30 mL Oral Daily PRN James Buchanan MD   nicotine polacrilex 2 mg Oral Q2H PRN James Buchanan MD   OLANZapine 10 mg Intramuscular Q3H PRN James Buchanan MD   OLANZapine 5 mg Oral Q3H PRN Grafens Lakhani, DO   risperiDONE 1 mg Oral Q4H PRN James Buchanan MD   risperiDONE 2 mg Oral Daily Domonique Moncadaat, CRNP   risperiDONE 4 mg Oral HS Domonique Moncadaat, CRNP   traZODone 50 mg Oral HS PRN Graf Hash, DO       Risks / Benefits of Treatment:     Risks, benefits, and possible side effects of medications explained to patient and patient verbalizes understanding and agreement for treatment  Counseling / Coordination of Care:     Patient's progress reviewed with nursing staff  Medications, treatment progress and treatment plan reviewed with patient  Supportive counseling provided to the patient            Alessandro Basilio

## 2020-03-14 PROCEDURE — 99232 SBSQ HOSP IP/OBS MODERATE 35: CPT | Performed by: PSYCHIATRY & NEUROLOGY

## 2020-03-14 RX ORDER — RISPERIDONE 1 MG/1
1 TABLET, FILM COATED ORAL DAILY
Status: DISCONTINUED | OUTPATIENT
Start: 2020-03-15 | End: 2020-03-16 | Stop reason: HOSPADM

## 2020-03-14 RX ADMIN — DIVALPROEX SODIUM 1000 MG: 500 TABLET, DELAYED RELEASE ORAL at 17:25

## 2020-03-14 RX ADMIN — BENZTROPINE MESYLATE 0.5 MG: 0.5 TABLET ORAL at 08:26

## 2020-03-14 RX ADMIN — ACETAMINOPHEN 975 MG: 325 TABLET ORAL at 12:31

## 2020-03-14 RX ADMIN — NICOTINE POLACRILEX 2 MG: 2 GUM, CHEWING BUCCAL at 21:57

## 2020-03-14 RX ADMIN — RISPERIDONE 4 MG: 2 TABLET ORAL at 21:16

## 2020-03-14 RX ADMIN — NICOTINE POLACRILEX 2 MG: 2 GUM, CHEWING BUCCAL at 17:17

## 2020-03-14 RX ADMIN — BENZTROPINE MESYLATE 0.5 MG: 0.5 TABLET ORAL at 17:26

## 2020-03-14 RX ADMIN — MAGNESIUM HYDROXIDE 30 ML: 400 SUSPENSION ORAL at 12:29

## 2020-03-14 RX ADMIN — NICOTINE POLACRILEX 2 MG: 2 GUM, CHEWING BUCCAL at 09:54

## 2020-03-14 RX ADMIN — IBUPROFEN 600 MG: 600 TABLET ORAL at 12:29

## 2020-03-14 RX ADMIN — DIVALPROEX SODIUM 1000 MG: 500 TABLET, DELAYED RELEASE ORAL at 08:26

## 2020-03-14 RX ADMIN — Medication 1 TABLET: at 08:26

## 2020-03-14 RX ADMIN — RISPERIDONE 2 MG: 2 TABLET ORAL at 08:26

## 2020-03-14 RX ADMIN — NICOTINE POLACRILEX 2 MG: 2 GUM, CHEWING BUCCAL at 06:52

## 2020-03-14 RX ADMIN — FERROUS SULFATE TAB 325 MG (65 MG ELEMENTAL FE) 325 MG: 325 (65 FE) TAB at 06:35

## 2020-03-14 NOTE — PROGRESS NOTES
Patient out on unit, pleasant and bright  Patient is looking forward to possible discharge next week  Patient denies SI/HI and A/V  Patient is medication compliant

## 2020-03-14 NOTE — TREATMENT TEAM
03/14/20 1030   Activity/Group Checklist   Group Other (Comment)  (Surviving Trauma Group/Education, Open Discussion)   Attendance Attended   Attendance Duration (min) 31-45  (Patient left group early)   Interactions Interacted appropriately   Affect/Mood Appropriate   Goals Achieved Able to listen to others

## 2020-03-14 NOTE — PROGRESS NOTES
Patient denies all symptoms  She is calm, cooperative and pleasant  States "I am doing wonderful!" Does not appear manic or pressured  Will continue to monitor

## 2020-03-14 NOTE — PROGRESS NOTES
Progress Notes- Behavioral Health       Assessment/Plan  Principal Problem:  Bipolar disorder most recent episode severe with psychotic feature  Cannabis abuse disorder in remission  Hypokalemia  Pre diabetes  Intermittent asthma without complication  PLAN:   1) will decrease Risperdal to 1 mg in the morning as patient is complaining of being dizzy in the morning  2) Continue Group and individual therapy  3) Discharge planing  4) discussed with staff  5) some of her labs were abnormal and Depakote level was 103 it is repeated results are not back yet  INTERVAL HISTORY:  This is a 61-year-old female who was seen for a follow-up appointment  Staff reported that patient is being stable she has a planned to be discharged next week sometimes  Patient also reported that she is doing good she feels that her medication needs further adjustment however she is sleeping well and eating well  She denied any hallucinations delusions suicidal homicidal ideas  She said overall she is doing well and she is ready to go    Scheduled Meds:  Scheduled Meds:  Current Facility-Administered Medications:  acetaminophen 650 mg Oral Q6H PRN Leonela Chu MD   acetaminophen 975 mg Oral Q6H PRN Leonela Chu MD   albuterol 2 puff Inhalation Q6H PRN Leonela Chu MD   aluminum-magnesium hydroxide-simethicone 15 mL Oral Q4H PRN Leonela Chu MD   benztropine 1 mg Intramuscular Q6H PRN Leonela Chu MD   benztropine 0 5 mg Oral BID Leonela Chu MD   benztropine 1 mg Oral Q6H PRN Leonela Chu MD   divalproex sodium 1,000 mg Oral QPM Leonela Chu MD   divalproex sodium 1,000 mg Oral Daily Leonela Chu MD   ferrous sulfate 325 mg Oral Daily With Breakfast FRANCISCA Mckenzie   haloperidol 5 mg Oral Q6H PRN Leonela Chu MD   haloperidol lactate 5 mg Intramuscular Q6H PRN Leonela Chu MD   hydrOXYzine HCL 25 mg Oral Q4H PRN Brii Roque DO   hydrOXYzine HCL 25 mg Oral Q6H PRN Masha Vilchis MD   hydrOXYzine HCL 50 mg Oral Q6H PRN Masha Vilchis MD   hydrOXYzine HCL 75 mg Oral Q6H PRN Masha Vilchis MD   ibuprofen 600 mg Oral Q6H PRN Masha Vilchis MD   influenza vaccine 0 5 mL Intramuscular Once Tamra Weiner MD   LORazepam 1 mg Intramuscular Q6H PRN Masha Vilchis MD   magnesium hydroxide 30 mL Oral Daily PRN Masha Vilchis MD   multivitamin-minerals 1 tablet Oral Daily FRANCISCA Mckenzie   nicotine polacrilex 2 mg Oral Q2H PRN Masah Vilchis MD   OLANZapine 10 mg Intramuscular Q3H PRN Masha Vilchis MD   OLANZapine 5 mg Oral Q3H PRN Beola Batters, DO   risperiDONE 1 mg Oral Q4H PRN Masha Vilchis MD   [START ON 3/15/2020] risperiDONE 1 mg Oral Daily Jimi Rondon MD   risperiDONE 4 mg Oral HS FRANCISCA Mckenzie   traZODone 50 mg Oral HS PRN Beola Batters, DO     Continuous Infusions:   PRN Meds:   acetaminophen    acetaminophen    albuterol    aluminum-magnesium hydroxide-simethicone    benztropine    benztropine    haloperidol    haloperidol lactate    hydrOXYzine HCL    hydrOXYzine HCL    hydrOXYzine HCL    hydrOXYzine HCL    ibuprofen    LORazepam    magnesium hydroxide    nicotine polacrilex    OLANZapine    OLANZapine    risperiDONE    traZODone         Allergies: No Known Allergies    Review of systems:  Review of system is positive for history of asthma pre diabetes and hypokalemia rest is finds patient also has history of heavy menstrual   She said currently she is doing well and has no current medical problems  She believes her dizziness is from high dose of Risperdal this writer will adjust that  Mental Status Evaluation:  Appearance:  Appeared of her stated age neatly dressed  Behavior:  Cooperative pleasant talkative  Speech:  Normal goal-directed clear  Mood:  "I am much better mood now  Affect Anxious bright  Language: Intact  Thought Process:  Logical and linear     Thought Content:  Denying any paranoia and delusions  Perceptual Disturbances: Denying any hallucinations or visual hallucinations  Risk Potential: No suicidal homicidal ideas no history of aggression  Sensorium:  Oriented x3  Cognition:  Good memory intact cognition  Consciousness:  Alert and awake  Attention: Good  Intellect: Average  Fund of Knowledge: Normal    Insight:  Good  Judgment: Intact  Improved  Muscle Strength and Tone: Normal    Gait/Station: [place]  Motor Activity: I am psychomotor activity  Lab Results: I have personally reviewed pertinent lab results  Depakote level 113  NOTE:  Total of  20  minutes were spent in talking to patient completing this medical record reviewing medical chart medical decision making    Diane Hinkle MD

## 2020-03-14 NOTE — TREATMENT TEAM
03/14/20 0915   Activity/Group Checklist   Group Other (Comment)  (OPEN STUDIO/Art Therapy, Social Interaction-Free Expression)   Attendance Attended   Attendance Duration (min) 46-60   Interactions Interacted appropriately   Affect/Mood Appropriate   Goals Achieved Able to listen to others; Able to engage in interactions

## 2020-03-14 NOTE — PROGRESS NOTES
Assumed care of patient at 1900  She has been calm, cooperative and social, active in the milieu and appropriate with peers  Denies all psychiatric s/s

## 2020-03-15 PROCEDURE — 99232 SBSQ HOSP IP/OBS MODERATE 35: CPT | Performed by: PSYCHIATRY & NEUROLOGY

## 2020-03-15 RX ADMIN — Medication 1 TABLET: at 08:56

## 2020-03-15 RX ADMIN — RISPERIDONE 4 MG: 2 TABLET ORAL at 21:02

## 2020-03-15 RX ADMIN — BENZTROPINE MESYLATE 0.5 MG: 0.5 TABLET ORAL at 08:56

## 2020-03-15 RX ADMIN — RISPERIDONE 1 MG: 1 TABLET ORAL at 08:56

## 2020-03-15 RX ADMIN — FERROUS SULFATE TAB 325 MG (65 MG ELEMENTAL FE) 325 MG: 325 (65 FE) TAB at 08:56

## 2020-03-15 RX ADMIN — NICOTINE POLACRILEX 2 MG: 2 GUM, CHEWING BUCCAL at 17:46

## 2020-03-15 RX ADMIN — BENZTROPINE MESYLATE 0.5 MG: 0.5 TABLET ORAL at 17:13

## 2020-03-15 RX ADMIN — MAGNESIUM HYDROXIDE 30 ML: 400 SUSPENSION ORAL at 19:48

## 2020-03-15 RX ADMIN — DIVALPROEX SODIUM 1000 MG: 500 TABLET, DELAYED RELEASE ORAL at 17:13

## 2020-03-15 RX ADMIN — DIVALPROEX SODIUM 1000 MG: 500 TABLET, DELAYED RELEASE ORAL at 08:56

## 2020-03-15 RX ADMIN — NICOTINE POLACRILEX 2 MG: 2 GUM, CHEWING BUCCAL at 14:55

## 2020-03-15 RX ADMIN — NICOTINE POLACRILEX 2 MG: 2 GUM, CHEWING BUCCAL at 07:19

## 2020-03-15 NOTE — PROGRESS NOTES
Progress Notes- Behavioral Health       Assessment/Plan  Principal Problem:  Bipolar disorder most recent episode severe with psychotic feature  Cannabis abuse disorder in remission  Hypokalemia  Pre diabetes  Intermittent asthma without complication  PLAN:   1) Risperdal was decreased to 1 mg in the morning and 4 mg at bedtime however this writer would not further decrease the patient is complaining of dizzy spells  2) Continue Group and individual therapy  3) Discharge planing  4) case was discussed with staff  INTERVAL HISTORY:  Patient appeared in better mood and spirits today  She had her hair done  She said that she is doing very well  She denied any hallucinations or delusions staff reports that patient has told staff that she is here because of broken heart  Patient told this writer that she is still having dizzy spells in the morning and she feels tired however she is feeling better  She is compliant with her medications and her meals  This writer does not want to further decrease or change of medications    Scheduled Meds:  Scheduled Meds:  Scheduled Meds:  Current Facility-Administered Medications:  acetaminophen 650 mg Oral Q6H PRN Leon Leslie MD   acetaminophen 975 mg Oral Q6H PRN Leon Leslie MD   albuterol 2 puff Inhalation Q6H PRN Leon Leslie MD   aluminum-magnesium hydroxide-simethicone 15 mL Oral Q4H PRN Leon Leslie MD   benztropine 1 mg Intramuscular Q6H PRN Leon Leslie MD   benztropine 0 5 mg Oral BID Leon Leslie MD   benztropine 1 mg Oral Q6H PRN Leon Leslie MD   divalproex sodium 1,000 mg Oral QPM Leon Leslie MD   divalproex sodium 1,000 mg Oral Daily Leon Leslie MD   ferrous sulfate 325 mg Oral Daily With Breakfast FRANCISCA Mckenzie   haloperidol 5 mg Oral Q6H PRN Leon Leslie MD   haloperidol lactate 5 mg Intramuscular Q6H PRN Leon Leslie MD   hydrOXYzine HCL 25 mg Oral Q4H PRN Yenny Josefina, DO   hydrOXYzine HCL 25 mg Oral Q6H PRN Leydi Jurado MD   hydrOXYzine HCL 50 mg Oral Q6H PRN Leydi Jurado MD   hydrOXYzine HCL 75 mg Oral Q6H PRN Leydi Jurado, MD   ibuprofen 600 mg Oral Q6H PRN Leydi Jurado, MD   influenza vaccine 0 5 mL Intramuscular Once Dale Crespo MD   LORazepam 1 mg Intramuscular Q6H PRN Leydi Jurado MD   magnesium hydroxide 30 mL Oral Daily PRN Leydi Jurado, MD   multivitamin-minerals 1 tablet Oral Daily Domonique Lozoya, CRNP   nicotine polacrilex 2 mg Oral Q2H PRN Leydi Jurado MD   OLANZapine 10 mg Intramuscular Q3H PRN Leydi Jurado, MD   OLANZapine 5 mg Oral Q3H PRN Chyrl Lanius, DO   risperiDONE 1 mg Oral Q4H PRN Leydi Jurado, MD   risperiDONE 1 mg Oral Daily Marbella Leiva MD   risperiDONE 4 mg Oral HS Domonique L Devora, CRNP   traZODone 50 mg Oral HS PRN Chyrl Lanius, DO     Continuous Infusions:   PRN Meds:   acetaminophen    acetaminophen    albuterol    aluminum-magnesium hydroxide-simethicone    benztropine    benztropine    haloperidol    haloperidol lactate    hydrOXYzine HCL    hydrOXYzine HCL    hydrOXYzine HCL    hydrOXYzine HCL    ibuprofen    LORazepam    magnesium hydroxide    nicotine polacrilex    OLANZapine    OLANZapine    risperiDONE    traZODone      Allergies: No Known Allergies    Review of systems:  A review of systems positive history of asthma pre diabetes and hypokalemia  Currently she is doing well however she is complaining of dizzy spell is better than before  No other major medical problems reported or noted       Vitals:    03/14/20 0714 03/14/20 1515 03/15/20 0716 03/15/20 1535   BP: 131/84 137/79 117/72 138/69   BP Location: Left arm Left arm Left arm Left arm   Pulse: 97 95 84 96   Resp: 16 16 16 16   Temp: 97 9 °F (36 6 °C) 98 °F (36 7 °C) 97 8 °F (36 6 °C) 98 2 °F (36 8 °C)   TempSrc: Temporal Temporal Temporal Temporal   SpO2:       Weight: 69 4 kg (153 lb)      Height: Mental Status Evaluation:  Appearance:  Patient has improved appears to be his room she still wearing hospital garb but felt and reported better for herself  Behavior:  Cooperative  Speech:  Normal goal-directed clear  Mood:  Improved  Affect Less anxious   Language:   Normal   Spoke with a thick [place] accent but clear [address]  Thought Process:  Logical and linear   Thought Content:  No paranoia or delusions   Perceptual Disturbances: No hallucinations  Risk Potential: No suicidal homicidal ideas   Sensorium:  Oriented x3  Cognition:  Intact memory and clear cognition  Consciousness:  Awake and alert  Attention: Good  Intellect: Average build   Fund of Knowledge: Normal    Insight:  Good  Judgment: Shaila Dawn  Muscle Strength and Tone: Normal    Normal psychomotor activity Id was observed to be normal    Motor Activity: I am psychomotor activity  Lab Results: I have personally reviewed pertinent lab results  Depakote level 113  NOTE:  Total of  20  minutes were spent in talking to patient completing this medical record reviewing medical chart medical decision making    Leonardo Nobles MD

## 2020-03-15 NOTE — PROGRESS NOTES
Assumed care of patient at 1900  Patient denies all psychiatric symptoms  She is calm, pleasant, bright and social with peers  She is very much looking forward to d/c this coming week

## 2020-03-16 VITALS
WEIGHT: 153 LBS | SYSTOLIC BLOOD PRESSURE: 111 MMHG | HEIGHT: 64 IN | HEART RATE: 93 BPM | TEMPERATURE: 98.2 F | BODY MASS INDEX: 26.12 KG/M2 | RESPIRATION RATE: 16 BRPM | OXYGEN SATURATION: 99 % | DIASTOLIC BLOOD PRESSURE: 55 MMHG

## 2020-03-16 PROBLEM — Z00.8 MEDICAL CLEARANCE FOR PSYCHIATRIC ADMISSION: Status: RESOLVED | Noted: 2020-02-28 | Resolved: 2020-03-16

## 2020-03-16 LAB
ALBUMIN SERPL BCP-MCNC: 2.8 G/DL (ref 3–5.2)
ALP SERPL-CCNC: 48 U/L (ref 43–122)
ALT SERPL W P-5'-P-CCNC: 15 U/L (ref 9–52)
ANION GAP SERPL CALCULATED.3IONS-SCNC: 1 MMOL/L (ref 5–14)
AST SERPL W P-5'-P-CCNC: 15 U/L (ref 14–36)
BASOPHILS # BLD AUTO: 0 THOUSANDS/ΜL (ref 0–0.1)
BASOPHILS NFR BLD AUTO: 1 % (ref 0–1)
BILIRUB SERPL-MCNC: <0.1 MG/DL
BUN SERPL-MCNC: 9 MG/DL (ref 5–25)
CALCIUM SERPL-MCNC: 8.5 MG/DL (ref 8.4–10.2)
CHLORIDE SERPL-SCNC: 105 MMOL/L (ref 97–108)
CO2 SERPL-SCNC: 33 MMOL/L (ref 22–30)
CREAT SERPL-MCNC: 0.62 MG/DL (ref 0.6–1.2)
EOSINOPHIL # BLD AUTO: 0.1 THOUSAND/ΜL (ref 0–0.4)
EOSINOPHIL NFR BLD AUTO: 1 % (ref 0–6)
ERYTHROCYTE [DISTWIDTH] IN BLOOD BY AUTOMATED COUNT: 14.2 %
GFR SERPL CREATININE-BSD FRML MDRD: 133 ML/MIN/1.73SQ M
GLUCOSE P FAST SERPL-MCNC: 80 MG/DL (ref 70–99)
GLUCOSE SERPL-MCNC: 80 MG/DL (ref 70–99)
HCT VFR BLD AUTO: 33 % (ref 36–46)
HGB BLD-MCNC: 10.9 G/DL (ref 12–16)
LYMPHOCYTES # BLD AUTO: 2.9 THOUSANDS/ΜL (ref 0.5–4)
LYMPHOCYTES NFR BLD AUTO: 46 % (ref 25–45)
MCH RBC QN AUTO: 31 PG (ref 26–34)
MCHC RBC AUTO-ENTMCNC: 33 G/DL (ref 31–36)
MCV RBC AUTO: 94 FL (ref 80–100)
MONOCYTES # BLD AUTO: 0.4 THOUSAND/ΜL (ref 0.2–0.9)
MONOCYTES NFR BLD AUTO: 7 % (ref 1–10)
NEUTROPHILS # BLD AUTO: 2.9 THOUSANDS/ΜL (ref 1.8–7.8)
NEUTS SEG NFR BLD AUTO: 46 % (ref 45–65)
PLATELET # BLD AUTO: 242 THOUSANDS/UL (ref 150–450)
PMV BLD AUTO: 9.7 FL (ref 8.9–12.7)
POTASSIUM SERPL-SCNC: 4.3 MMOL/L (ref 3.6–5)
PROT SERPL-MCNC: 5.5 G/DL (ref 5.9–8.4)
RBC # BLD AUTO: 3.51 MILLION/UL (ref 4–5.2)
SODIUM SERPL-SCNC: 139 MMOL/L (ref 137–147)
VALPROATE SERPL-MCNC: 107 UG/ML (ref 50–120)
WBC # BLD AUTO: 6.4 THOUSAND/UL (ref 4.5–11)

## 2020-03-16 PROCEDURE — 85025 COMPLETE CBC W/AUTO DIFF WBC: CPT | Performed by: NURSE PRACTITIONER

## 2020-03-16 PROCEDURE — 99238 HOSP IP/OBS DSCHRG MGMT 30/<: CPT | Performed by: NURSE PRACTITIONER

## 2020-03-16 PROCEDURE — 80053 COMPREHEN METABOLIC PANEL: CPT | Performed by: NURSE PRACTITIONER

## 2020-03-16 PROCEDURE — 80164 ASSAY DIPROPYLACETIC ACD TOT: CPT | Performed by: NURSE PRACTITIONER

## 2020-03-16 RX ORDER — RISPERIDONE 4 MG/1
4 TABLET, FILM COATED ORAL
Qty: 30 TABLET | Refills: 0 | Status: SHIPPED | OUTPATIENT
Start: 2020-03-16 | End: 2021-01-26 | Stop reason: ALTCHOICE

## 2020-03-16 RX ORDER — DIVALPROEX SODIUM 500 MG/1
1000 TABLET, DELAYED RELEASE ORAL DAILY
Qty: 30 TABLET | Refills: 1 | Status: SHIPPED | OUTPATIENT
Start: 2020-03-17 | End: 2021-01-26 | Stop reason: ALTCHOICE

## 2020-03-16 RX ORDER — DIVALPROEX SODIUM 500 MG/1
1000 TABLET, DELAYED RELEASE ORAL EVERY EVENING
Qty: 30 TABLET | Refills: 1 | Status: SHIPPED | OUTPATIENT
Start: 2020-03-16 | End: 2021-01-26 | Stop reason: ALTCHOICE

## 2020-03-16 RX ORDER — RISPERIDONE 1 MG/1
1 TABLET, FILM COATED ORAL DAILY
Qty: 30 TABLET | Refills: 1 | Status: SHIPPED | OUTPATIENT
Start: 2020-03-17 | End: 2021-01-26 | Stop reason: ALTCHOICE

## 2020-03-16 RX ORDER — FERROUS SULFATE 325(65) MG
325 TABLET ORAL
Qty: 30 TABLET | Refills: 1 | Status: SHIPPED | OUTPATIENT
Start: 2020-03-17

## 2020-03-16 RX ORDER — RISPERIDONE 4 MG/1
4 TABLET, FILM COATED ORAL
Qty: 30 TABLET | Refills: 1 | Status: SHIPPED | OUTPATIENT
Start: 2020-03-16 | End: 2020-03-16

## 2020-03-16 RX ADMIN — DIVALPROEX SODIUM 1000 MG: 500 TABLET, DELAYED RELEASE ORAL at 08:30

## 2020-03-16 RX ADMIN — NICOTINE POLACRILEX 2 MG: 2 GUM, CHEWING BUCCAL at 06:12

## 2020-03-16 RX ADMIN — RISPERIDONE 1 MG: 1 TABLET ORAL at 08:30

## 2020-03-16 RX ADMIN — NICOTINE POLACRILEX 2 MG: 2 GUM, CHEWING BUCCAL at 10:29

## 2020-03-16 RX ADMIN — Medication 1 TABLET: at 08:30

## 2020-03-16 RX ADMIN — FERROUS SULFATE TAB 325 MG (65 MG ELEMENTAL FE) 325 MG: 325 (65 FE) TAB at 08:30

## 2020-03-16 RX ADMIN — BENZTROPINE MESYLATE 0.5 MG: 0.5 TABLET ORAL at 08:30

## 2020-03-16 NOTE — PROGRESS NOTES
Patient denies all symptoms and is looking forward to discharge today  Pleasant, cooperative and about the unit  Plans on following up with outpatient tx and being compliant with medications after discharge

## 2020-03-16 NOTE — DISCHARGE SUMMARY
Discharge Summary - Max Chávez 42 y o  female MRN: [id number]  Unit/Bed#: [facility] Encounter: [id number]     Admission Date: 2/27/2020         Discharge Date: 03/16/20    Attending Psychiatrist: Flor Felipe MD    Reason for Admission/HPI:     Copied from HPI on Admission    "Christian Zavala is a 42 y o  female with a history of Bipolar Disorder who was admitted to the inpatient psychiatric unit on a involuntary 302 commitment basis due to psychotic symptoms and bizarre behavior      Symptoms prior to admission included poor concentration, poor appetite, difficulty sleeping, mood swings, increased irritability, bizarre behavior, delusional thinking with persecutory delusions, noncompliance with treatment and noncompliance with medications  Onset of symptoms was gradual starting several weeks ago with progressively worsening course since that time  Stressors preceding admission included chronic mental illness and noncompliance with treatment  Megan David was brought in to ED by police due to bizarre behavior and psychotic symptoms  She was non-compliant with her psychiatric medications that included long acting injectable medications  She was not sleeping at home, not eating and was unable to manage her children  Family was concerned about her safety and safety of her children  On admission she was noted to have persecutory and grandiose delusions talking about "just becoming a millionaire"  She was disorganized, disoriented and difficulty providing psychiatric history      On initial evaluation after admission to the inpatient psychiatric unit Megan David was cooperative with assessment, but disorganized, labile and grandiose  She claimed that she was in the hospital "to get detoxed from marijuana" (but at the same time denied any marijuana use for the last 7 months)   She was unable to provide reliable psychiatric history or describe events prior to admission " took me to the hospital  I am waiting for them to examine my eyes " She was resistive to start psychiatric medications "I don't need any medications  I had bipolar disorder in the past, but not now", but eventually agreed to start treatment with a mood stabilizer and an antipsychotic agent "      Meds/Allergies     all current active meds have been reviewed    No Known Allergies    Objective     Vital signs in last 24 hours:  Temp:  [98 2 °F (36 8 °C)] 98 2 °F (36 8 °C)  HR:  [93-96] 93  Resp:  [16] 16  BP: (111-138)/(55-69) 111/55    No intake or output data in the 24 hours ending 03/16/20 [address] [place] [address] Course: The patient was admitted to the inpatient psychiatric unit and started on every 15 minutes precautions  During the hospitalization the patient was attending individual therapy, group therapy, milieu therapy and occupational therapy  Psychiatric medications were titrated over the hospital stay  To address manic symptoms and disorganized behavior the patient was started on mood stabilizer Depakote and antipsychotic medication Zyprexa  Medication doses were titrated during the hospital course  Prior to beginning of treatment medications risks and benefits and possible side effects including risk of parkinsonian symptoms, Tardive Dyskinesia and metabolic syndrome related to treatment with antipsychotic medications and risk of liver impairment related to treatment with Depakote were reviewed with the patient  The patient verbalized understanding and agreement for treatment  Patient's symptoms improved gradually over the hospital course  At the end of treatment the patient was doing well  Mood was stable at the time of discharge  The patient denied suicidal ideation, intent or plan at the time of discharge and denied homicidal ideation, intent or plan at the time of discharge  There was no overt psychosis at the time of discharge  Sleep and appetite were improved   The patient was tolerating medications and was not reporting any significant side effects at the time of discharge  Since the patient was doing well at the end of the hospitalization, treatment team felt that the patient could be safely discharged to outpatient care  The outpatient follow up with psychiatry and a  therapist was arranged by the unit  upon discharge      Mental Status at Time of Discharge:     Appearance Adequate hygiene and grooming   Behavior calm and cooperative   Mood euthymic   Speech Normal rate and volume   Affect appropriate   Thought Processes Goal directed and coherent   Thought Content Does not verbalize delusional material   Associations Tightly connected   Perceptual Disturbances Denies hallucinations and does not appear to be responding to internal stimuli   Risk Potential Suicidal/Homicidal Ideation - No evidence of suicidal or homicidal ideation and Patient does not verbalize suicidal or homicidal ideation  Risk of Violence - No  Risk of Self Mutilation - No   Orientation oriented to person, place, time/date and situation   Memory recent and remote memory grossly intact   Consciousness alert and awake   Attention/Concentration attention span and concentration are age appropriate   Insight fair   Judgement fair   Muscle Strength and Gait normal muscle strength and normal muscle tone, normal gait/station and normal balance   Motor Activity no abnormal movements     Admission Diagnosis:  Principal Problem:    Bipolar I disorder, most recent episode manic, severe with psychotic features ([address] )  Active Problems:    Mild intermittent asthma without complication    Cannabis abuse, in remission    Medical clearance for psychiatric admission    Hypokalemia    Prediabetes    Heavy menstrual period      Discharge Diagnosis:     Principal Problem:    Bipolar I disorder, most recent episode manic, severe with psychotic features ([address] )  Active Problems:    Mild intermittent asthma without complication    Cannabis abuse, in remission    Medical clearance for psychiatric admission    Hypokalemia    Prediabetes    Heavy menstrual period  Resolved Problems:    Pain in both lower extremities      Lab results:    No results displayed because visit has over 200 results  Discharge Medications:    See after visit summary for reconciled discharge medications provided to patient and family  Discharge instructions/Information to patient and family:     See after visit summary for information provided to patient and family  Provisions for Follow-Up Care:    See after visit summary for information related to follow-up care and any pertinent home health orders  Discharge Statement     I spent 30 minutes discharging the patient  This time was spent on the day of discharge  I had direct contact with the patient on the day of discharge  Additional documentation is required if more than 30 minutes were spent on discharge:    I reviewed with Ivana importance of compliance with medications and outpatient treatment after discharge  I discussed the medication regimen and possible side effects of the medications with Ivana prior to discharge  At the time of discharge Ivana was tolerating psychiatric medications  I reviewed with Ivana crisis plan and safety plan upon discharge

## 2020-03-16 NOTE — CASE MANAGEMENT
Davian Stern (Spouse)  [phone] (M)    SW called and spoke with spouse and he will  Pt at 1:00pm

## 2020-03-16 NOTE — TREATMENT TEAM
[facility] Group Note     03/16/20 1100   Activity/Group Checklist   Group Personal control   Attendance Attended   Attendance Duration (min) 31-45   Interactions Interacted appropriately   Affect/Mood Appropriate   Goals Achieved Identified feelings; Identified triggers; Discussed coping strategies; Able to listen to others; Able to engage in interactions; Able to recieve feedback; Able to give feedback to another

## 2020-03-16 NOTE — CASE MANAGEMENT
Pt discharge today at 1:00pm,  will transport Pt home  Pt was given information on applying for [facility] MA and assistance with mental health services through [facility] mental health   Pt has insurance through [facility] and has not cancelled that insurance and applied in [place] [address] so she was instructed to do so upon discharge    Pt was given Good Rx for  prescriptions

## 2020-03-16 NOTE — TREATMENT TEAM
03/13/20 0700   Team Meeting   Meeting Type Daily Rounds   Team Members Present   Team Members Present Physician;Nurse;;; Other (Discipline and Name)   Physician Team Member [facility]   Nursing Team Member Jefry   Care Management Team Member Luis A Cho and [address] [facility] Work Team Member Nourse   Other (Discipline and Name) [place], NP, med students   Patient/Family Present   Patient Present No   Patient's Family Present No     D/ C Today

## 2020-03-16 NOTE — DISCHARGE INSTR - LAB
If you smoke, use tobacco or nicotine, and/or are exposed to second hand smoke, you are encouraged to stop to improve your health    If you need help quitting, please talk to your health care provider or call:  · Anette Alicia ([phone])  · [facility] ([phone])   · [address] [place] [address] ([phone])

## 2020-03-16 NOTE — PLAN OF CARE
Problem: INVOLUNTARY ADMIT  Goal: Will cooperate with staff recommendations and doctor's orders and will demonstrate appropriate behavior  Description  INTERVENTIONS:  - Treat underlying conditions and offer medication as ordered  - Educate regarding involuntary admission procedures and rules  - Utilize positive consistent limit setting strategies to support patient and staff safety  Outcome: Adequate for Discharge     Problem: DISCHARGE PLANNING  Goal: Discharge to home or other facility with appropriate resources  Description  INTERVENTIONS:  - Identify barriers to discharge w/patient and caregiver  - Arrange for needed discharge resources and transportation as appropriate  - Identify discharge learning needs (meds, wound care, etc )  - Arrange for interpretive services to assist at discharge as needed  - Refer to Case Management Department for coordinating discharge planning if the patient needs post-hospital services based on physician/advanced practitioner order or complex needs related to functional status, cognitive ability, or social support system  Outcome: Adequate for Discharge     Problem: Ineffective Coping  Goal: Participates in unit activities  Description  Interventions:  - Provide therapeutic environment   - Provide required programming   - Redirect inappropriate behaviors   Outcome: Adequate for Discharge     Problem: PSYCHOSIS  Goal: Will report no hallucinations or delusions  Description  Interventions:  - Administer medication as  ordered  - Every waking shifts and PRN assess for the presence of hallucinations and or delusions  - Assist with reality testing to support increasing orientation  - Assess if patient's hallucinations or delusions are encouraging self-harm or harm to others and intervene as appropriate  Outcome: Adequate for Discharge     Problem: Alteration in Thoughts and Perception  Goal: Agree to be compliant with medication regime, as prescribed and report medication side effects  Description  Interventions:  - Offer appropriate PRN medication and supervise ingestion; conduct AIMS, as needed   Outcome: Adequate for Discharge  Goal: Recognize dysfunctional thoughts, communicate reality-based thoughts at the time of discharge  Description  Interventions:  - Provide medication and psycho-education to assist patient in compliance and developing insight into his/her illness   Outcome: Adequate for Discharge

## 2020-03-16 NOTE — PLAN OF CARE
Problem: INVOLUNTARY ADMIT  Goal: Will cooperate with staff recommendations and doctor's orders and will demonstrate appropriate behavior  Description  INTERVENTIONS:  - Treat underlying conditions and offer medication as ordered  - Educate regarding involuntary admission procedures and rules  - Utilize positive consistent limit setting strategies to support patient and staff safety  3/16/2020 0954 by Simone Johnson RN  Outcome: Completed  3/16/2020 0856 by Simone Johnson RN  Outcome: Adequate for Discharge     Problem: DISCHARGE PLANNING  Goal: Discharge to home or other facility with appropriate resources  Description  INTERVENTIONS:  - Identify barriers to discharge w/patient and caregiver  - Arrange for needed discharge resources and transportation as appropriate  - Identify discharge learning needs (meds, wound care, etc )  - Arrange for interpretive services to assist at discharge as needed  - Refer to Case Management Department for coordinating discharge planning if the patient needs post-hospital services based on physician/advanced practitioner order or complex needs related to functional status, cognitive ability, or social support system  3/16/2020 0954 by Simone Johnson RN  Outcome: Completed  3/16/2020 0856 by Simone Johnson RN  Outcome: Adequate for Discharge     Problem: Ineffective Coping  Goal: Participates in unit activities  Description  Interventions:  - Provide therapeutic environment   - Provide required programming   - Redirect inappropriate behaviors   3/16/2020 0954 by Simone Johnson RN  Outcome: Completed  3/16/2020 0856 by Simone Johnson RN  Outcome: Adequate for Discharge     Problem: PSYCHOSIS  Goal: Will report no hallucinations or delusions  Description  Interventions:  - Administer medication as  ordered  - Every waking shifts and PRN assess for the presence of hallucinations and or delusions  - Assist with reality testing to support increasing orientation  - Assess if patient's hallucinations or delusions are encouraging self-harm or harm to others and intervene as appropriate  3/16/2020 0954 by Cedric Knutson RN  Outcome: Completed  3/16/2020 0856 by Cedric Knutson RN  Outcome: Adequate for Discharge     Problem: Alteration in Thoughts and Perception  Goal: Agree to be compliant with medication regime, as prescribed and report medication side effects  Description  Interventions:  - Offer appropriate PRN medication and supervise ingestion; conduct AIMS, as needed   3/16/2020 0954 by Cedric Knutson RN  Outcome: Completed  3/16/2020 0856 by Cedric Knutson RN  Outcome: Adequate for Discharge  Goal: Recognize dysfunctional thoughts, communicate reality-based thoughts at the time of discharge  Description  Interventions:  - Provide medication and psycho-education to assist patient in compliance and developing insight into his/her illness   3/16/2020 0954 by Cedric Knutson RN  Outcome: Completed  3/16/2020 0856 by Cedric Knutson RN  Outcome: Adequate for Discharge

## 2020-03-16 NOTE — DISCHARGE INSTR - OTHER ORDERS
Crisis Information  If you are experiencing a mental health emergency, you may call the [address] 24 hours a day, 7 days per week at [phone]  In [facility], call [phone]  When you need someone to listen, the [facility] is available for 16 hours a day, 7 days a week, from the time of 7-10am and 2pm-2am   It is not available from the hours of 2am-6am and 10am-2pm  A representative can be reached at [phone]  The [facility] Family-to-Family Education Program is a free 12-week (2 1/2 hours/week) course for families of individuals with severe brain disorders (mental illnesses)  The classes are taught by trained family members  All course materials are furnished at no cost to you  Below are some details  To register, e-mail [email or web address] or call [phone]  The curriculum focuses on schizophrenia, bipolar disorder (manic depression), clinical depression, panic disorders and obsessive-compulsive disorder (OCD)  The course discusses the clinical treatment of these illnesses and teaches the knowledge and skills that family members need to cope more effectively    Topics Include:   Learning about feelings, learning about facts    Schizophrenia, major depression and tiffanie: diagnosis and dealing with critical periods    Subtypes of depression and bipolar disorder, panic disorder and OCD; diagnosis and causes; sharing our stories    The biology of the brain/new research    Problem solving workshops    Medication review    Empathy workshop  what its like to have a brain disorder    Communication skills workshop    Self-care and relative groups   [facility] Group, services available    Advocacy: fighting stigma    Review and certification ceremony    [employer] Education Course  The [employer] Education class is a ten week  two hours per week  experiential education course on the topic of recovery for any person with serious mental illness who is interested in establishing and maintaining wellness  The course uses a combination of lecture, interactive exercises, and structural group processes  The diversity of experience among participants affords for a lively dynamic that moves the course along  [facility] [employer]s Nqin[employer]ha-Jnmy Education class is offered free of charge to people who experience mental illness  You do not need to be a member of [employer]IE to take the course  Courses are taught by teams of trained mentors/peer-teachers who are themselves experienced at living well with mental illness  Below are some details  To register, call [phone] or e-mail [email or web address]  Sign up today! [facility] group is for family members, caregivers, and loved ones of individuals living with the everyday challenges of mental illness  The leaders are family members in the same situation  Sessions take place in an intimate, confidential setting to allow families to share openly with each other  These support groups allow participants to learn from the experiences of other group members, share coping strategies, and offer each other encouragement and understanding  Courtney Tejeda know that you are not alone  Drop inno registration is necessary  Here are the times and locations  [place]  Monthly: 3rd Monday, 7:00-8:30 pm  [facility], [place]  Monthly: 4th Tuesday, 7:00-8:30 pm  [facility]  Monthly: 1st Monday, 7:00-8:30 pm  [address]         Monthly Support for Persons with Mental Illness  The Peer Support Group is a monthly meeting for individuals facing the challenges of recovering from severe and persistent mental illness   Depression, manic depression, schizophrenia, and general anxiety disorder are only a few of the diagnoses of individuals who have found a supportive place at our meetings  Our [place]  We are a fellowship of individuals who share a common goal of recovery and the ability to maintain mental and emotional stability  We help others and ourselves through sharing our experiences, strength and hope with each other  No matter how traumatic our past or how despairing our present may be, there is hope for a new day  Sessions take place in an intimate, confidential setting to allow individuals to share openly with each other  Etienne Quiroga know that you are not alone  Drop inno registration is necessary  Here are the times and locations  [place]  Monthly: 1st Monday, 7:00-8:30 pm  [facility]  [address] [place], [place]   [id number]  Monthly: 3rd Monday, 7:00-8:30 pm  [address], [address]:  If you or someone you know has a drug or alcohol problem, there is help:  [address]: [address]: [phone]  An assessment is the first step  In addition to those listed there are other programs available in the area but assessment is best to determine an appropriate level of care  If you DO NOT have Medical Assistance (MA) or [employer], an assessment can be scheduled at one of these providers:  [address] [facility] [address], [address] [place]  [phone]   101 [facility] [address] , [place], [address] [place]  3314 [facility] [address]   [facility] [place], [address]   Step by 8012 [facility] [address] , [place], 98 [facility]  [phone]   Treatment Trends  [place]  1320 [facility] , [place], 98 [facility]  2000 [facility],[address] , [address], [place], [address] [facility] [phone]     If you HAVE Medical Assistance, an assessment can be scheduled at one of these providers:  [place] [facility]  [address] , [facility], 98 [facility] [address]  [address], [address]  [address]  D&A Intake Unit 1001 [facility] [address] , 1st Floor, [place], [address]  [address], 300 [facility],6Th Floor, [place] [facility] [place], 4420 [facility] [place] [address] , [facility], [address]  [address], [facility]) [address], [address], [address]   Step by 8012 [facility] [address] , [facility], 98 [facility]  [phone]   Treatment Trends  [place]  1320 [facility] , [facility], 98 [facility]  2000 [facility],[address] , [address], [facility], [address] Giselle Aguilar [phone]     If you [address], an assessment can be scheduled at one of these providers  Please contact these Providers to determine if they are in your network plan:  [facility] D&A Intake Unit  620 [facility] [address] , 1st Floor, [place], [address]  [address] , [facility], [address]  [address], 122 [facility]) [address], [address] 800 [facility] [address] , [address], [facility], 1451 [facility] walk-in hours for [employer]             [facility] Thursday [phone] AND Wednesday [phone] and 1:00-3:00            2102 [facility] Marlys Willard         PHONE  [phone]      FOLLOW UP WITH YOUR MEDICAL ASSISTANCE APPLICATION:  Zita Simental 1460  PHONE : [phone]

## 2020-03-17 NOTE — BH TRANSITION RECORD
Contact Information: If you have any questions, concerns, pended studies, tests and/or procedures, or emergencies regarding your inpatient behavioral health visit  Please contact [address] [place] [address] behavioral health unit 3B [phone]  and ask to speak to a , nurse or physician  A contact is available 24 hours/ 7 days a week at this number  Summary of Procedures Performed During your Stay:  Below is a list of major procedures performed during your hospital stay and a summary of results:  - Cardiac Procedures/Studies: EKG  - Major Imaging Studies: VAS lower limb venous duplex study  Pending Studies (From admission, onward)    None        If studies are pending at discharge, follow up with your PCP and/or referring provider

## 2020-03-21 ENCOUNTER — HOSPITAL ENCOUNTER (EMERGENCY)
Facility: HOSPITAL | Age: 40
Discharge: HOME | End: 2020-03-21
Attending: EMERGENCY MEDICINE
Payer: COMMERCIAL

## 2020-03-21 VITALS
TEMPERATURE: 98.5 F | SYSTOLIC BLOOD PRESSURE: 159 MMHG | RESPIRATION RATE: 18 BRPM | WEIGHT: 144.18 LBS | HEART RATE: 112 BPM | DIASTOLIC BLOOD PRESSURE: 85 MMHG | BODY MASS INDEX: 24.75 KG/M2 | OXYGEN SATURATION: 99 %

## 2020-03-21 DIAGNOSIS — J45.901 ASTHMA EXACERBATION: Primary | ICD-10-CM

## 2020-03-21 LAB
FLUAV RNA NPH QL NAA+PROBE: NORMAL
FLUBV RNA NPH QL NAA+PROBE: NORMAL
RSV RNA NPH QL NAA+PROBE: NORMAL

## 2020-03-21 PROCEDURE — 99284 EMERGENCY DEPT VISIT MOD MDM: CPT

## 2020-03-21 PROCEDURE — 99284 EMERGENCY DEPT VISIT MOD MDM: CPT | Performed by: EMERGENCY MEDICINE

## 2020-03-21 PROCEDURE — 87631 RESP VIRUS 3-5 TARGETS: CPT | Performed by: EMERGENCY MEDICINE

## 2020-03-21 PROCEDURE — 87635 SARS-COV-2 COVID-19 AMP PRB: CPT | Performed by: EMERGENCY MEDICINE

## 2020-03-21 RX ORDER — CETIRIZINE HYDROCHLORIDE 10 MG/1
10 TABLET ORAL DAILY
Qty: 30 TABLET | Refills: 0 | Status: SHIPPED | OUTPATIENT
Start: 2020-03-21 | End: 2021-03-21

## 2020-03-21 RX ORDER — ALBUTEROL SULFATE 90 UG/1
4 AEROSOL, METERED RESPIRATORY (INHALATION) ONCE
Status: COMPLETED | OUTPATIENT
Start: 2020-03-21 | End: 2020-03-21

## 2020-03-21 RX ORDER — PREDNISONE 10 MG/1
TABLET ORAL
Qty: 27 TABLET | Refills: 0 | OUTPATIENT
Start: 2020-03-21 | End: 2022-04-15

## 2020-03-21 RX ORDER — ALBUTEROL SULFATE 90 UG/1
2 AEROSOL, METERED RESPIRATORY (INHALATION) EVERY 4 HOURS PRN
Qty: 1 INHALER | Refills: 0 | Status: SHIPPED | OUTPATIENT
Start: 2020-03-21 | End: 2022-04-15 | Stop reason: SDUPTHER

## 2020-03-21 RX ADMIN — ALBUTEROL SULFATE 4 PUFF: 90 AEROSOL, METERED RESPIRATORY (INHALATION) at 17:14

## 2020-03-21 RX ADMIN — PREDNISONE 50 MG: 20 TABLET ORAL at 17:14

## 2020-03-21 NOTE — DISCHARGE INSTRUCTIONS
[address]    Your healthcare provider and/or public health staff have evaluated you and have determined that you do not need to remain in the hospital at this time  At this time you can be isolated at home where you will be monitored by staff from your local or state health department  You should carefully follow the prevention and isolation steps below until a healthcare provider or local or state health department says that you can return to your normal activities  Stay home except to get medical care    People who are mildly ill with COVID-19 are able to isolate at home during their illness  You should restrict activities outside your home, except for getting medical care  Do not go to work, school, or public areas  Avoid using public transportation, ride-sharing, or taxis  Separate yourself from other people and animals in your home    People: As much as possible, you should stay in a specific room and away from other people in your home  Also, you should use a separate bathroom, if available  Animals: You should restrict contact with pets and other animals while you are sick with COVID-19, just like you would around other people  Although there have not been reports of pets or other animals becoming sick with COVID-19, it is still recommended that people sick with COVID-19 limit contact with animals until more information is known about the virus  When possible, have another member of your household care for your animals while you are sick  If you are sick with COVID-19, avoid contact with your pet, including petting, snuggling, being kissed or licked, and sharing food  If you must care for your pet or be around animals while you are sick, wash your hands before and after you interact with pets and wear a facemask  See COVID-19 and Animals for more information      Call ahead before visiting your doctor    If you have a medical appointment, call the healthcare provider and tell them that you have or may have COVID-19  This will help the healthcare providers office take steps to keep other people from getting infected or exposed  Wear a facemask    You should wear a facemask when you are around other people (e g , sharing a room or vehicle) or pets and before you enter a healthcare providers office  If you are not able to wear a facemask (for example, because it causes trouble breathing), then people who live with you should not stay in the same room with you, or they should wear a facemask if they enter your room  Cover your coughs and sneezes    Cover your mouth and nose with a tissue when you cough or sneeze  Throw used tissues in a lined trash can  Immediately wash your hands with soap and water for at least 20 seconds or, if soap and water are not available, clean your hands with an alcohol-based hand  that contains at least 60% alcohol  Clean your hands often    Wash your hands often with soap and water for at least 20 seconds, especially after blowing your nose, coughing, or sneezing; going to the bathroom; and before eating or preparing food  If soap and water are not readily available, use an alcohol-based hand  with at least 60% alcohol, covering all surfaces of your hands and rubbing them together until they feel dry  Soap and water are the best option if hands are visibly dirty  Avoid touching your eyes, nose, and mouth with unwashed hands  Avoid sharing personal household items    You should not share dishes, drinking glasses, cups, eating utensils, towels, or bedding with other people or pets in your home  After using these items, they should be washed thoroughly with soap and water  Clean all high-touch surfaces everyday    High touch surfaces include counters, tabletops, doorknobs, bathroom fixtures, toilets, phones, keyboards, tablets, and bedside tables  Also, clean any surfaces that may have blood, stool, or body fluids on them   Use a household cleaning spray or wipe, according to the label instructions  Labels contain instructions for safe and effective use of the cleaning product including precautions you should take when applying the product, such as wearing gloves and making sure you have good ventilation during use of the product  Monitor your symptoms    Seek prompt medical attention if your illness is worsening (e g , difficulty breathing)  Before seeking care, call your healthcare provider and tell them that you have, or are being evaluated for, COVID-19  Put on a facemask before you enter the facility  These steps will help the healthcare providers office to keep other people in the office or waiting room from getting infected or exposed  Ask your healthcare provider to call the local or state health department  Persons who are placed under active monitoring or facilitated self-monitoring should follow instructions provided by their local health department or occupational health professionals, as appropriate  If you have a medical emergency and need to call 911, notify the dispatch personnel that you have, or are being evaluated for COVID-19  If possible, put on a facemask before emergency medical services arrive  Discontinuing home isolation    Patients with confirmed COVID-19 should remain under home isolation precautions until the risk of secondary transmission to others is thought to be low  The decision to discontinue home isolation precautions should be made on a case-by-case basis, in consultation with healthcare providers and [facility] and local health departments  Source: [employer]     Asthma   WHAT YOU NEED TO KNOW:   Asthma is a lung disease that makes breathing difficult  Chronic inflammation and reactions to triggers narrow the airways in the lungs  Asthma can become life-threatening if it is not managed          DISCHARGE INSTRUCTIONS:   Return to the emergency department if:   You have severe shortness of breath  Your lips or nails turn blue or gray  The skin around your neck and ribs pulls in with each breath  You have shortness of breath, even after you take your short-term medicine as directed  Your peak flow numbers are worsening  Contact your healthcare provider if:   You run out of medicine before your next refill is due  Your symptoms get worse  You need to take more medicine than usual to control your symptoms  You have questions or concerns about your condition or care  Manage other health conditions , such as allergies, acid reflux, and sleep apnea  Identify and avoid triggers  These may include pets, dust mites, mold, and cockroaches  Do not smoke or be around others who smoke  Nicotine and other chemicals in cigarettes and cigars can cause lung damage  Ask your healthcare provider for information if you currently smoke and need help to quit  E-cigarettes or smokeless tobacco still contain nicotine  Talk to your healthcare provider before you use these products  The flu can make your asthma worse  You may need a yearly flu shot

## 2020-03-21 NOTE — ED PROVIDER NOTES
History  Chief Complaint   Patient presents with    Shortness of Breath     pt reports sob and cough since monday  fever yesterday  history of asthma  used albuterol and robitussin  43 yo female with asthma, last admission required intubation in [facility] 7 months ago, which is also her last time she used steroids and has been stable since then with her MDI, until the past 5 days she has been experiencing increasing cough, shortness of breath, and onset of fever up to 101  She recent travel history to any high risk areas including [place] in the last 14 days, and no notification of any exposure to anyone positive for COVID  She lives with  children and older parents  History provided by:  Patient  URI   Presenting symptoms: cough and fever    Presenting symptoms: no sore throat    Cough:     Cough characteristics:  Harsh    Sputum characteristics:  Nondescript    Severity:  Moderate    Onset quality:  Gradual    Duration:  5 days    Timing:  Sporadic    Progression:  Worsening    Chronicity:  Recurrent  Severity: "101"  Duration:  1 day  Associated symptoms: wheezing    Associated symptoms: no headaches and no neck pain    Risk factors: no sick contacts        Prior to Admission Medications   Prescriptions Last Dose Informant Patient Reported? Taking?    Multiple Vitamin (MULTIVITAMIN) tablet  Self Yes Yes   Sig: Take 1 tablet by mouth daily   albuterol (PROVENTIL HFA,VENTOLIN HFA) 90 mcg/act inhaler  Self Yes No   Sig: Inhale 2 puffs every 6 (six) hours as needed for wheezing   benzonatate (TESSALON PERLES) 100 mg capsule   No No   Sig: Take 1 capsule (100 mg total) by mouth 3 (three) times a day as needed for cough   Patient not taking: Reported on 2/27/2020   diclofenac sodium (VOLTAREN) 1 %  Self No No   Sig: Apply 2 g topically 4 (four) times a day   Patient not taking: Reported on 2/27/2020   divalproex sodium (DEPAKOTE) 500 mg EC tablet   No Yes   Sig: Take 2 tablets (1,000 mg total) by mouth every evening   divalproex sodium (DEPAKOTE) 500 mg EC tablet   No Yes   Sig: Take 2 tablets (1,000 mg total) by mouth daily   ferrous sulfate 325 (65 Fe) mg tablet   No Yes   Sig: Take 1 tablet (325 mg total) by mouth daily with breakfast   risperiDONE (RisperDAL) 1 mg tablet   No Yes   Sig: Take 1 tablet (1 mg total) by mouth daily   risperiDONE (RisperDAL) 4 mg tablet   No Yes   Sig: Take 1 tablet (4 mg total) by mouth daily at bedtime      Facility-Administered Medications: None       Past Medical History:   Diagnosis Date    Asthma     Bipolar disorder ([facility] [address] )        Past Surgical History:   Procedure Laterality Date    NO PAST SURGERIES         Family History   Family history unknown: Yes     I have reviewed and agree with the history as documented  E-Cigarette/Vaping    E-Cigarette Use Never User      E-Cigarette/Vaping Substances    Nicotine No     THC No     CBD No     Flavoring No     Other No     Unknown No      Social History     Tobacco Use    Smoking status: Former Smoker     Packs/day: 1 50     Types: Cigarettes    Smokeless tobacco: Never Used   Substance Use Topics    Alcohol use: Yes     Frequency: 2-3 times a week     Drinks per session: 1 or 2     Binge frequency: Never     Comment: Weekends    Drug use: Never       Review of Systems   Constitutional: Positive for fever  Negative for appetite change and chills  HENT: Negative for sore throat  Respiratory: Positive for cough, chest tightness, shortness of breath and wheezing  Cardiovascular: Negative for chest pain and palpitations  Gastrointestinal: Negative for abdominal pain, diarrhea, nausea and vomiting  Genitourinary: Negative for dysuria and hematuria  Musculoskeletal: Negative for neck pain  Skin: Negative for rash  Neurological: Negative for dizziness, weakness and headaches  Psychiatric/Behavioral: Negative for suicidal ideas  All other systems reviewed and are negative        Physical Exam  Physical Exam Constitutional: She is oriented to person, place, and time  Vital signs are normal  She appears well-developed and well-nourished  Non-toxic appearance  HENT:   Head: Normocephalic and atraumatic  Right Ear: Tympanic membrane and external ear normal    Left Ear: Tympanic membrane and external ear normal    Nose: Nose normal    Mouth/Throat: Oropharynx is clear and moist    Eyes: Pupils are equal, round, and reactive to light  Conjunctivae and EOM are normal    Neck: Normal range of motion and full passive range of motion without pain  Neck supple  No Brudzinski's sign and no Kernig's sign noted  Cardiovascular: Normal rate, regular rhythm, normal heart sounds, intact distal pulses and normal pulses  No murmur heard  Pulmonary/Chest: Breath sounds normal  Accessory muscle usage present  No stridor  Tachypnea noted  No respiratory distress  She has no wheezes  Abdominal: Soft  Bowel sounds are normal  She exhibits no distension  There is no tenderness  There is no rigidity, no rebound and no guarding  Musculoskeletal: Normal range of motion  Right lower leg: She exhibits no swelling  Left lower leg: She exhibits no swelling  Lymphadenopathy:     She has no cervical adenopathy  Neurological: She is alert and oriented to person, place, and time  She has normal strength and normal reflexes  No cranial nerve deficit or sensory deficit  Coordination and gait normal  GCS eye subscore is 4  GCS verbal subscore is 5  GCS motor subscore is 6  Skin: Skin is warm and dry  No rash noted  She is not diaphoretic  No pallor  Psychiatric: She has a normal mood and affect  Her speech is normal and behavior is normal  Judgment and thought content normal  Cognition and memory are normal    Nursing note and vitals reviewed        Vital Signs  ED Triage Vitals   Temperature Pulse Respirations Blood Pressure SpO2   03/21/20 1635 03/21/20 1632 03/21/20 1632 03/21/20 1632 03/21/20 1632   98 5 °F (36 9 °C) (!) 117 20 159/85 99 %      Temp Source Heart Rate Source Patient Position - Orthostatic VS BP Location FiO2 (%)   03/21/20 1635 03/21/20 1632 03/21/20 1632 03/21/20 1632 --   Oral Monitor Sitting Right arm       Pain Score       --                  Vitals:    03/21/20 1632   BP: 159/85   Pulse: (!) 117   Patient Position - Orthostatic VS: Sitting         Visual Acuity      ED Medications  Medications   albuterol (PROVENTIL HFA,VENTOLIN HFA) inhaler 4 puff (4 puffs Inhalation Given 3/21/20 1714)   predniSONE tablet 50 mg (50 mg Oral Given 3/21/20 1714)       Diagnostic Studies  Results Reviewed     Procedure Component Value Units Date/Time    Influenza A/B and RSV PCR [[id number]]  (Normal) Collected:  03/21/20 1714    Lab Status:  Final result Specimen:  Nasopharyngeal Swab Updated:  03/21/20 1805     INFLUENZA A PCR None Detected     INFLUENZA B PCR None Detected     RSV PCR None Detected    2019 Novel Coronavirus (COVID-19), JUJU [[id number]] Collected:  03/21/20 1714    Lab Status: In process Specimen:  Nasopharyngeal Swab Updated:  03/21/20 1721                 No orders to display              Procedures  Procedures         ED Course  ED Course as of Mar 21 1825   Sat Mar 21, 2020   1754 She is feeling better, mild expiratory wheezing, with inspiratory rhonchi, her goal is to be discharged, I will repeat MDI in 15-20 mins and reassess      1821 She feels better, ready to go, she has instructions and return precautions                                    MDM  Number of Diagnoses or Management Options  Diagnosis management comments: She is experiencing moderate respiratory distress that I am managing with albuterol and steroids, but with h/o fever, I am inclined to send COVID testing for her  She has been masked since arrival in the ED, and examined by myself and staff with masks    I will start with MDI to avoid aerosolizing and oral steroids, then reassess          Disposition  Final diagnoses:   Asthma exacerbation     Time reflects when diagnosis was documented in both MDM as applicable and the Disposition within this note     Time User Action Codes Description Comment    3/21/2020  5:57 PM [address] Asthma exacerbation       ED Disposition     ED Disposition Condition Date/Time Comment    Discharge Good Sat Mar 21, 2020  6:24 PM Estuardo Serenatc discharge to home/self care  Follow-up Information     Follow up With Specialties Details Why Contact Info Additional [address] Schedule an appointment as soon as possible for a visit  For followup [address], [address] [facility] [id number]  [address], [address], [address] [place], [place], [address]          Patient's Medications   Discharge Prescriptions    ALBUTEROL (PROVENTIL HFA,VENTOLIN HFA) 90 MCG/ACT INHALER    Inhale 2 puffs every 4 (four) hours as needed for wheezing (or cough)       Start Date: 3/21/2020 End Date: --       Order Dose: 2 puffs       Quantity: 1 Inhaler    Refills: 0    CETIRIZINE (ZYRTEC) 10 MG TABLET    Take 1 tablet (10 mg total) by mouth daily       Start Date: 3/21/2020 End Date: 3/21/2021       Order Dose: 10 mg       Quantity: 30 tablet    Refills: 0    PREDNISONE 10 MG TABLET    START 4 po daily on days 1-5  Then 3 po on Day 6   2 po on Day 7   1 po on Day 8   1/2 po on Day 9 and 10  STOP       Start Date: 3/21/2020 End Date: --       Order Dose: --       Quantity: 27 tablet    Refills: 0     No discharge procedures on file      PDMP Review     None          ED Provider  Electronically Signed by           Luis Vasquez MD  03/21/20 0992

## 2020-03-28 LAB — SARS-COV-2 RNA SPEC QL NAA+PROBE: NOT DETECTED

## 2020-05-26 ENCOUNTER — APPOINTMENT (OUTPATIENT)
Dept: LAB | Facility: HOSPITAL | Age: 40
End: 2020-05-26
Payer: COMMERCIAL

## 2020-05-26 ENCOUNTER — TRANSCRIBE ORDERS (OUTPATIENT)
Dept: ADMINISTRATIVE | Facility: HOSPITAL | Age: 40
End: 2020-05-26

## 2020-05-26 ENCOUNTER — HOSPITAL ENCOUNTER (OUTPATIENT)
Dept: NON INVASIVE DIAGNOSTICS | Facility: HOSPITAL | Age: 40
Discharge: HOME | End: 2020-05-26
Payer: COMMERCIAL

## 2020-05-26 DIAGNOSIS — E55.9 VITAMIN D DEFICIENCY: ICD-10-CM

## 2020-05-26 DIAGNOSIS — Z79.899 ENCOUNTER FOR LONG-TERM (CURRENT) USE OF OTHER MEDICATIONS: Primary | ICD-10-CM

## 2020-05-26 DIAGNOSIS — Z79.899 ENCOUNTER FOR LONG-TERM (CURRENT) USE OF OTHER MEDICATIONS: ICD-10-CM

## 2020-05-26 LAB
ALBUMIN SERPL BCP-MCNC: 3.6 G/DL (ref 3.5–5)
ALP SERPL-CCNC: 59 U/L (ref 46–116)
ALT SERPL W P-5'-P-CCNC: 61 U/L (ref 12–78)
AMMONIA PLAS-SCNC: 19 UMOL/L (ref 11–35)
ANION GAP SERPL CALCULATED.3IONS-SCNC: 8 MMOL/L (ref 4–13)
AST SERPL W P-5'-P-CCNC: 37 U/L (ref 5–45)
ATRIAL RATE: 93 BPM
BASOPHILS # BLD AUTO: 0.04 THOUSANDS/ΜL (ref 0–0.1)
BASOPHILS NFR BLD AUTO: 1 % (ref 0–1)
BILIRUB SERPL-MCNC: 0.34 MG/DL (ref 0.2–1)
BUN SERPL-MCNC: 9 MG/DL (ref 5–25)
CALCIUM SERPL-MCNC: 9.1 MG/DL (ref 8.3–10.1)
CHLORIDE SERPL-SCNC: 102 MMOL/L (ref 100–108)
CHOLEST SERPL-MCNC: 151 MG/DL (ref 50–200)
CO2 SERPL-SCNC: 31 MMOL/L (ref 21–32)
CREAT SERPL-MCNC: 0.84 MG/DL (ref 0.6–1.3)
EOSINOPHIL # BLD AUTO: 0.04 THOUSAND/ΜL (ref 0–0.61)
EOSINOPHIL NFR BLD AUTO: 1 % (ref 0–6)
ERYTHROCYTE [DISTWIDTH] IN BLOOD BY AUTOMATED COUNT: 14.3 % (ref 11.6–15.1)
GFR SERPL CREATININE-BSD FRML MDRD: 103 ML/MIN/1.73SQ M
GLUCOSE SERPL-MCNC: 103 MG/DL (ref 65–140)
HCT VFR BLD AUTO: 43.8 % (ref 34.8–46.1)
HDLC SERPL-MCNC: 63 MG/DL
HGB BLD-MCNC: 14.4 G/DL (ref 11.5–15.4)
IMM GRANULOCYTES # BLD AUTO: 0.02 THOUSAND/UL (ref 0–0.2)
IMM GRANULOCYTES NFR BLD AUTO: 0 % (ref 0–2)
LDLC SERPL CALC-MCNC: 74 MG/DL (ref 0–100)
LYMPHOCYTES # BLD AUTO: 2.69 THOUSANDS/ΜL (ref 0.6–4.47)
LYMPHOCYTES NFR BLD AUTO: 36 % (ref 14–44)
MCH RBC QN AUTO: 33.4 PG (ref 26.8–34.3)
MCHC RBC AUTO-ENTMCNC: 32.9 G/DL (ref 31.4–37.4)
MCV RBC AUTO: 102 FL (ref 82–98)
MONOCYTES # BLD AUTO: 0.48 THOUSAND/ΜL (ref 0.17–1.22)
MONOCYTES NFR BLD AUTO: 7 % (ref 4–12)
NEUTROPHILS # BLD AUTO: 4.13 THOUSANDS/ΜL (ref 1.85–7.62)
NEUTS SEG NFR BLD AUTO: 55 % (ref 43–75)
NONHDLC SERPL-MCNC: 88 MG/DL
NRBC BLD AUTO-RTO: 0 /100 WBCS
P AXIS: 86 DEGREES
PLATELET # BLD AUTO: 200 THOUSANDS/UL (ref 149–390)
PMV BLD AUTO: 10.8 FL (ref 8.9–12.7)
POTASSIUM SERPL-SCNC: 3.7 MMOL/L (ref 3.5–5.3)
PR INTERVAL: 112 MS
PROLACTIN SERPL-MCNC: 22 NG/ML
PROT SERPL-MCNC: 7.6 G/DL (ref 6.4–8.2)
QRS AXIS: 58 DEGREES
QRSD INTERVAL: 72 MS
QT INTERVAL: 352 MS
QTC INTERVAL: 437 MS
RBC # BLD AUTO: 4.31 MILLION/UL (ref 3.81–5.12)
SODIUM SERPL-SCNC: 141 MMOL/L (ref 136–145)
T WAVE AXIS: 52 DEGREES
TRIGL SERPL-MCNC: 71 MG/DL
VENTRICULAR RATE: 93 BPM
WBC # BLD AUTO: 7.4 THOUSAND/UL (ref 4.31–10.16)

## 2020-05-26 PROCEDURE — 80061 LIPID PANEL: CPT

## 2020-05-26 PROCEDURE — 80053 COMPREHEN METABOLIC PANEL: CPT

## 2020-05-26 PROCEDURE — 82140 ASSAY OF AMMONIA: CPT

## 2020-05-26 PROCEDURE — 93010 ELECTROCARDIOGRAM REPORT: CPT | Performed by: INTERNAL MEDICINE

## 2020-05-26 PROCEDURE — 85025 COMPLETE CBC W/AUTO DIFF WBC: CPT

## 2020-05-26 PROCEDURE — 84146 ASSAY OF PROLACTIN: CPT

## 2020-05-26 PROCEDURE — 36415 COLL VENOUS BLD VENIPUNCTURE: CPT

## 2020-05-26 PROCEDURE — 93005 ELECTROCARDIOGRAM TRACING: CPT

## 2020-05-26 PROCEDURE — 80165 DIPROPYLACETIC ACID FREE: CPT

## 2020-05-28 LAB — VALPROATE FREE SERPL-MCNC: 13.4 UG/ML (ref 6–22)

## 2020-08-24 ENCOUNTER — APPOINTMENT (OUTPATIENT)
Dept: LAB | Facility: HOSPITAL | Age: 40
End: 2020-08-24
Payer: COMMERCIAL

## 2020-08-24 ENCOUNTER — TRANSCRIBE ORDERS (OUTPATIENT)
Dept: ADMINISTRATIVE | Facility: HOSPITAL | Age: 40
End: 2020-08-24

## 2020-08-24 DIAGNOSIS — U07.1 INFECTION DUE TO WUHAN CORONAVIRUS: Primary | ICD-10-CM

## 2020-08-24 DIAGNOSIS — U07.1 INFECTION DUE TO WUHAN CORONAVIRUS: ICD-10-CM

## 2020-08-24 LAB — AMMONIA PLAS-SCNC: 20 UMOL/L (ref 11–35)

## 2020-08-24 PROCEDURE — 82140 ASSAY OF AMMONIA: CPT

## 2020-08-24 PROCEDURE — 80165 DIPROPYLACETIC ACID FREE: CPT

## 2020-08-24 PROCEDURE — 36415 COLL VENOUS BLD VENIPUNCTURE: CPT

## 2020-08-26 LAB — VALPROATE FREE SERPL-MCNC: 5.5 UG/ML (ref 6–22)

## 2020-10-05 ENCOUNTER — DOCTOR'S OFFICE (OUTPATIENT)
Dept: URBAN - METROPOLITAN AREA CLINIC 136 | Facility: CLINIC | Age: 40
Setting detail: OPHTHALMOLOGY
End: 2020-10-05
Payer: COMMERCIAL

## 2020-10-05 ENCOUNTER — RX ONLY (RX ONLY)
Age: 40
End: 2020-10-05

## 2020-10-05 DIAGNOSIS — H04.123: ICD-10-CM

## 2020-10-05 PROCEDURE — 92004 COMPRE OPH EXAM NEW PT 1/>: CPT | Performed by: OPHTHALMOLOGY

## 2020-10-05 ASSESSMENT — REFRACTION_AUTOREFRACTION
OS_AXIS: 176
OS_SPHERE: -1.00
OD_CYLINDER: -2.50
OD_AXIS: 176
OD_SPHERE: -0.75
OS_CYLINDER: -2.75

## 2020-10-05 ASSESSMENT — CONFRONTATIONAL VISUAL FIELD TEST (CVF)
OD_FINDINGS: FULL
OS_FINDINGS: FULL

## 2020-10-05 ASSESSMENT — VISUAL ACUITY
OD_BCVA: 20/40-2
OS_BCVA: 20/50+2

## 2020-10-05 ASSESSMENT — SPHEQUIV_DERIVED
OS_SPHEQUIV: -2.375
OD_SPHEQUIV: -2

## 2020-10-05 ASSESSMENT — SUPERFICIAL PUNCTATE KERATITIS (SPK)
OD_SPK: 3+
OS_SPK: 2+ 3+

## 2020-10-21 ENCOUNTER — DOCTOR'S OFFICE (OUTPATIENT)
Dept: URBAN - METROPOLITAN AREA CLINIC 136 | Facility: CLINIC | Age: 40
Setting detail: OPHTHALMOLOGY
End: 2020-10-21
Payer: COMMERCIAL

## 2020-10-21 DIAGNOSIS — H04.123: ICD-10-CM

## 2020-10-21 PROCEDURE — 92012 INTRM OPH EXAM EST PATIENT: CPT | Performed by: OPHTHALMOLOGY

## 2020-10-21 ASSESSMENT — REFRACTION_AUTOREFRACTION
OS_AXIS: 176
OS_SPHERE: -1.00
OS_CYLINDER: -2.75
OD_AXIS: 176
OD_CYLINDER: -2.50
OD_SPHERE: -0.75

## 2020-10-21 ASSESSMENT — CONFRONTATIONAL VISUAL FIELD TEST (CVF)
OD_FINDINGS: FULL
OS_FINDINGS: FULL

## 2020-10-21 ASSESSMENT — SPHEQUIV_DERIVED
OD_SPHEQUIV: -2
OS_SPHEQUIV: -2.375

## 2020-10-21 ASSESSMENT — VISUAL ACUITY
OD_BCVA: 20/70+2
OS_BCVA: 20/50-2

## 2020-10-21 ASSESSMENT — TONOMETRY
OS_IOP_MMHG: 12
OD_IOP_MMHG: 15

## 2020-10-21 ASSESSMENT — SUPERFICIAL PUNCTATE KERATITIS (SPK)
OD_SPK: 1+ 2+
OS_SPK: T

## 2020-11-18 ENCOUNTER — DOCTOR'S OFFICE (OUTPATIENT)
Dept: URBAN - METROPOLITAN AREA CLINIC 136 | Facility: CLINIC | Age: 40
Setting detail: OPHTHALMOLOGY
End: 2020-11-18
Payer: COMMERCIAL

## 2020-11-18 ENCOUNTER — RX ONLY (RX ONLY)
Age: 40
End: 2020-11-18

## 2020-11-18 DIAGNOSIS — H04.122: ICD-10-CM

## 2020-11-18 DIAGNOSIS — H04.121: ICD-10-CM

## 2020-11-18 PROCEDURE — 92014 COMPRE OPH EXAM EST PT 1/>: CPT | Performed by: OPHTHALMOLOGY

## 2020-11-18 ASSESSMENT — SUPERFICIAL PUNCTATE KERATITIS (SPK)
OS_SPK: T
OD_SPK: T

## 2020-11-18 ASSESSMENT — TONOMETRY
OD_IOP_MMHG: 12
OS_IOP_MMHG: 13

## 2020-11-18 ASSESSMENT — REFRACTION_AUTOREFRACTION
OS_CYLINDER: -2.75
OD_SPHERE: -0.75
OD_CYLINDER: -2.50
OS_AXIS: 176
OS_SPHERE: -1.00
OD_AXIS: 176

## 2020-11-18 ASSESSMENT — VISUAL ACUITY
OS_BCVA: 20/40+2
OD_BCVA: 20/30+2

## 2020-11-18 ASSESSMENT — SPHEQUIV_DERIVED
OS_SPHEQUIV: -2.375
OD_SPHEQUIV: -2

## 2020-11-18 ASSESSMENT — CONFRONTATIONAL VISUAL FIELD TEST (CVF)
OD_FINDINGS: FULL
OS_FINDINGS: FULL

## 2020-12-01 ENCOUNTER — OFFICE VISIT (OUTPATIENT)
Dept: OBGYN CLINIC | Facility: CLINIC | Age: 40
End: 2020-12-01
Payer: COMMERCIAL

## 2020-12-01 VITALS
DIASTOLIC BLOOD PRESSURE: 70 MMHG | HEIGHT: 65 IN | SYSTOLIC BLOOD PRESSURE: 108 MMHG | WEIGHT: 152 LBS | BODY MASS INDEX: 25.33 KG/M2

## 2020-12-01 DIAGNOSIS — R10.2 PELVIC PAIN: Primary | ICD-10-CM

## 2020-12-01 PROCEDURE — 87491 CHLMYD TRACH DNA AMP PROBE: CPT | Performed by: OBSTETRICS & GYNECOLOGY

## 2020-12-01 PROCEDURE — 99203 OFFICE O/P NEW LOW 30 MIN: CPT | Performed by: OBSTETRICS & GYNECOLOGY

## 2020-12-01 PROCEDURE — 87591 N.GONORRHOEAE DNA AMP PROB: CPT | Performed by: OBSTETRICS & GYNECOLOGY

## 2020-12-01 PROCEDURE — 87661 TRICHOMONAS VAGINALIS AMPLIF: CPT | Performed by: OBSTETRICS & GYNECOLOGY

## 2020-12-01 RX ORDER — PREDNISOLONE ACETATE 10 MG/ML
SUSPENSION/ DROPS OPHTHALMIC
COMMUNITY
Start: 2020-10-05

## 2020-12-01 RX ORDER — CYCLOSPORINE 0.5 MG/ML
EMULSION OPHTHALMIC
COMMUNITY
Start: 2020-11-16

## 2020-12-02 ENCOUNTER — HOSPITAL ENCOUNTER (OUTPATIENT)
Dept: ULTRASOUND IMAGING | Facility: HOSPITAL | Age: 40
Discharge: HOME | End: 2020-12-02
Attending: OBSTETRICS & GYNECOLOGY
Payer: COMMERCIAL

## 2020-12-02 DIAGNOSIS — R10.2 PELVIC PAIN: ICD-10-CM

## 2020-12-02 PROCEDURE — 76830 TRANSVAGINAL US NON-OB: CPT

## 2020-12-02 PROCEDURE — 76856 US EXAM PELVIC COMPLETE: CPT

## 2020-12-03 LAB
C TRACH DNA SPEC QL NAA+PROBE: NEGATIVE
N GONORRHOEA DNA SPEC QL NAA+PROBE: NEGATIVE

## 2020-12-05 LAB — T VAGINALIS RRNA SPEC QL NAA+PROBE: NEGATIVE

## 2020-12-07 ENCOUNTER — TELEPHONE (OUTPATIENT)
Dept: OBGYN CLINIC | Facility: CLINIC | Age: 40
End: 2020-12-07

## 2020-12-07 DIAGNOSIS — N83.201 CYST OF RIGHT OVARY: Primary | ICD-10-CM

## 2021-01-01 NOTE — TELEPHONE ENCOUNTER
----- Message from Rupa Davis MD sent at 3/2/2020  1:31 PM EST -----  Dear team,   Please get an appointment soon for this patient seen in the ED  Needs hormonal work-up  Prefer's morning appointments       Thank you  Pamella Arellano
Left message for pt to return our call back 
2021

## 2021-01-14 ENCOUNTER — HOSPITAL ENCOUNTER (OUTPATIENT)
Dept: ULTRASOUND IMAGING | Facility: HOSPITAL | Age: 41
Discharge: HOME | End: 2021-01-14
Attending: OBSTETRICS & GYNECOLOGY
Payer: COMMERCIAL

## 2021-01-14 DIAGNOSIS — N83.201 CYST OF RIGHT OVARY: ICD-10-CM

## 2021-01-14 PROCEDURE — 76856 US EXAM PELVIC COMPLETE: CPT

## 2021-01-14 PROCEDURE — 76830 TRANSVAGINAL US NON-OB: CPT

## 2021-01-20 ENCOUNTER — TELEPHONE (OUTPATIENT)
Dept: OBGYN CLINIC | Facility: CLINIC | Age: 41
End: 2021-01-20

## 2021-01-20 NOTE — TELEPHONE ENCOUNTER
----- Message from Celia Canavan, DO sent at 1/19/2021  4:23 PM EST -----  Inform patient cyst decrease in size, no further ultrasound needed, keep annual visit as scheduled

## 2021-01-20 NOTE — TELEPHONE ENCOUNTER
Patient informed ultrasound showed cyst decreased in size, no further ultrasound needed, will keep annual visit as scheduled

## 2021-01-26 ENCOUNTER — ANNUAL EXAM (OUTPATIENT)
Dept: OBGYN CLINIC | Facility: CLINIC | Age: 41
End: 2021-01-26
Payer: COMMERCIAL

## 2021-01-26 VITALS
BODY MASS INDEX: 25.99 KG/M2 | HEIGHT: 65 IN | WEIGHT: 156 LBS | SYSTOLIC BLOOD PRESSURE: 132 MMHG | DIASTOLIC BLOOD PRESSURE: 82 MMHG

## 2021-01-26 DIAGNOSIS — Z01.419 ENCOUNTER FOR ANNUAL ROUTINE GYNECOLOGICAL EXAMINATION: Primary | ICD-10-CM

## 2021-01-26 DIAGNOSIS — N83.201 CYST OF RIGHT OVARY: ICD-10-CM

## 2021-01-26 PROBLEM — N92.0 HEAVY MENSTRUAL PERIOD: Status: RESOLVED | Noted: 2020-03-11 | Resolved: 2021-01-26

## 2021-01-26 PROCEDURE — G0145 SCR C/V CYTO,THINLAYER,RESCR: HCPCS | Performed by: OBSTETRICS & GYNECOLOGY

## 2021-01-26 PROCEDURE — 99395 PREV VISIT EST AGE 18-39: CPT | Performed by: OBSTETRICS & GYNECOLOGY

## 2021-01-26 PROCEDURE — 87624 HPV HI-RISK TYP POOLED RSLT: CPT | Performed by: OBSTETRICS & GYNECOLOGY

## 2021-01-26 RX ORDER — CARIPRAZINE 1.5 MG/1
CAPSULE, GELATIN COATED ORAL
COMMUNITY
Start: 2020-12-29

## 2021-01-26 NOTE — PROGRESS NOTES
Assessment/Plan:    Pap smear done as well as annual   Reviewed pelvic ultrasound revealing decreased size of right ovarian cyst   Patient is asymptomatic, no further ultrasound needed at this time  Encouraged self-breast examination as well as calcium supplementation  Reviewed breast cancer screening starting at age 36 with baseline mammogram   Recommend decreased tobacco use  Reviewed history of asthma  Patient does not have a primary care physician  Encourage establishing PCP  Names were provided today  Reviewed birth control options  All questions answered  She will continue to use condoms  She will continue to follow-up with her psychiatrist and psychologist as scheduled  Return to office in 1 year or p r n  No problem-specific Assessment & Plan notes found for this encounter  Diagnoses and all orders for this visit:    Encounter for annual routine gynecological examination  -     Liquid-based pap, screening    Cyst of right ovary    Other orders  -     Vraylar 1 5 MG capsule          Subjective:      Patient ID: Estuardo Yu is a 47 y o  female  HPI       This is a very pleasant 79-year-old female  ( x3, age 17, 10, 8 ) presents for her annual gyn exam   She was being followed for right simple ovarian cyst, decreasing in size, now asymptomatic  Her menstrual cycles are regular approximately every 4 weeks lasting 5 days with no breakthrough bleeding  She is sexually active and has been in a monogamous relationship with her  for over 19 years  They have been  however they still live together and talk about possible future pregnancies  They are using condoms for birth control  Patient states that her Pap smears have been normal     Patient is currently unemployed, 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, 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  She does follow up with her psychiatrist and therapist on a regular basis      She does have a history of asthma and has not established care with a local primary care physician  When she has difficulty with asthma she will be seen in the emergency room  She does smoke approximately 6 cigarettes a day  The following portions of the patient's history were reviewed and updated as appropriate: allergies, current medications, past family history, past medical history, past social history, past surgical history and problem list     Review of Systems   Constitutional: Negative for fatigue, fever and unexpected weight change  Respiratory: Negative for cough, chest tightness, shortness of breath and wheezing  Cardiovascular: Negative  Negative for chest pain and palpitations  Gastrointestinal: Negative  Negative for abdominal distention, abdominal pain, blood in stool, constipation, diarrhea, nausea and vomiting  Genitourinary: Negative  Negative for difficulty urinating, dyspareunia, dysuria, flank pain, frequency, genital sores, hematuria, pelvic pain, urgency, vaginal bleeding, vaginal discharge and vaginal pain  Skin: Negative for rash  Objective:      /82   Ht 5' 5" (1 651 m)   Wt 70 8 kg (156 lb)   LMP 01/01/2021   BMI 25 96 kg/m²          Physical Exam  Constitutional:       Appearance: Normal appearance  She is well-developed  Cardiovascular:      Rate and Rhythm: Normal rate and regular rhythm  Pulmonary:      Effort: Pulmonary effort is normal       Breath sounds: Normal breath sounds  Chest:      Breasts:         Right: No inverted nipple, mass, nipple discharge, skin change or tenderness  Left: No inverted nipple, mass, nipple discharge, skin change or tenderness  Abdominal:      General: Bowel sounds are normal  There is no distension  Palpations: Abdomen is soft  Tenderness: There is no abdominal tenderness  There is no guarding or rebound  Genitourinary:     Labia:         Right: No rash, tenderness or lesion  Left: No rash, tenderness or lesion         Vagina: Normal  No signs of injury  No vaginal discharge, tenderness or lesions  Cervix: No cervical motion tenderness, discharge, friability, lesion, erythema or cervical bleeding  Uterus: Not enlarged and not tender  Adnexa:         Right: No mass, tenderness or fullness  Left: No mass, tenderness or fullness  Skin:     General: Skin is warm and dry  Neurological:      Mental Status: She is alert and oriented to person, place, and time

## 2021-01-28 LAB
HPV HR 12 DNA CVX QL NAA+PROBE: NEGATIVE
HPV16 DNA CVX QL NAA+PROBE: NEGATIVE
HPV18 DNA CVX QL NAA+PROBE: NEGATIVE

## 2021-01-29 ENCOUNTER — LAB (OUTPATIENT)
Dept: LAB | Facility: HOSPITAL | Age: 41
End: 2021-01-29
Payer: COMMERCIAL

## 2021-01-29 ENCOUNTER — TRANSCRIBE ORDERS (OUTPATIENT)
Dept: ADMINISTRATIVE | Facility: HOSPITAL | Age: 41
End: 2021-01-29

## 2021-01-29 DIAGNOSIS — E55.9 VITAMIN D DEFICIENCY DISEASE: ICD-10-CM

## 2021-01-29 DIAGNOSIS — Z79.899 ENCOUNTER FOR LONG-TERM (CURRENT) USE OF OTHER MEDICATIONS: ICD-10-CM

## 2021-01-29 DIAGNOSIS — E55.9 VITAMIN D DEFICIENCY DISEASE: Primary | ICD-10-CM

## 2021-01-29 LAB
25(OH)D3 SERPL-MCNC: 34.7 NG/ML (ref 30–100)
ALBUMIN SERPL BCP-MCNC: 3.6 G/DL (ref 3.5–5)
ALP SERPL-CCNC: 80 U/L (ref 46–116)
ALT SERPL W P-5'-P-CCNC: 16 U/L (ref 12–78)
ANION GAP SERPL CALCULATED.3IONS-SCNC: 6 MMOL/L (ref 4–13)
AST SERPL W P-5'-P-CCNC: 15 U/L (ref 5–45)
BASOPHILS # BLD AUTO: 0.05 THOUSANDS/ΜL (ref 0–0.1)
BASOPHILS NFR BLD AUTO: 1 % (ref 0–1)
BILIRUB SERPL-MCNC: 0.28 MG/DL (ref 0.2–1)
BUN SERPL-MCNC: 7 MG/DL (ref 5–25)
CALCIUM SERPL-MCNC: 9.4 MG/DL (ref 8.3–10.1)
CHLORIDE SERPL-SCNC: 103 MMOL/L (ref 100–108)
CHOLEST SERPL-MCNC: 170 MG/DL (ref 50–200)
CO2 SERPL-SCNC: 30 MMOL/L (ref 21–32)
CREAT SERPL-MCNC: 0.68 MG/DL (ref 0.6–1.3)
EOSINOPHIL # BLD AUTO: 0.14 THOUSAND/ΜL (ref 0–0.61)
EOSINOPHIL NFR BLD AUTO: 1 % (ref 0–6)
ERYTHROCYTE [DISTWIDTH] IN BLOOD BY AUTOMATED COUNT: 13.7 % (ref 11.6–15.1)
GFR SERPL CREATININE-BSD FRML MDRD: 128 ML/MIN/1.73SQ M
GLUCOSE P FAST SERPL-MCNC: 70 MG/DL (ref 65–99)
HCT VFR BLD AUTO: 40.8 % (ref 34.8–46.1)
HDLC SERPL-MCNC: 52 MG/DL
HGB BLD-MCNC: 13.4 G/DL (ref 11.5–15.4)
IMM GRANULOCYTES # BLD AUTO: 0.03 THOUSAND/UL (ref 0–0.2)
IMM GRANULOCYTES NFR BLD AUTO: 0 % (ref 0–2)
LDLC SERPL CALC-MCNC: 110 MG/DL (ref 0–100)
LYMPHOCYTES # BLD AUTO: 3.47 THOUSANDS/ΜL (ref 0.6–4.47)
LYMPHOCYTES NFR BLD AUTO: 35 % (ref 14–44)
MCH RBC QN AUTO: 31.9 PG (ref 26.8–34.3)
MCHC RBC AUTO-ENTMCNC: 32.8 G/DL (ref 31.4–37.4)
MCV RBC AUTO: 97 FL (ref 82–98)
MONOCYTES # BLD AUTO: 0.41 THOUSAND/ΜL (ref 0.17–1.22)
MONOCYTES NFR BLD AUTO: 4 % (ref 4–12)
NEUTROPHILS # BLD AUTO: 5.69 THOUSANDS/ΜL (ref 1.85–7.62)
NEUTS SEG NFR BLD AUTO: 59 % (ref 43–75)
NONHDLC SERPL-MCNC: 118 MG/DL
NRBC BLD AUTO-RTO: 0 /100 WBCS
PLATELET # BLD AUTO: 325 THOUSANDS/UL (ref 149–390)
PMV BLD AUTO: 10.7 FL (ref 8.9–12.7)
POTASSIUM SERPL-SCNC: 4 MMOL/L (ref 3.5–5.3)
PROT SERPL-MCNC: 7.9 G/DL (ref 6.4–8.2)
RBC # BLD AUTO: 4.2 MILLION/UL (ref 3.81–5.12)
SODIUM SERPL-SCNC: 139 MMOL/L (ref 136–145)
T4 SERPL-MCNC: 9.9 UG/DL (ref 4.7–13.3)
TRIGL SERPL-MCNC: 39 MG/DL
TSH SERPL DL<=0.05 MIU/L-ACNC: 0.92 UIU/ML (ref 0.36–3.74)
VIT B12 SERPL-MCNC: 1052 PG/ML (ref 100–900)
WBC # BLD AUTO: 9.79 THOUSAND/UL (ref 4.31–10.16)

## 2021-01-29 PROCEDURE — 82306 VITAMIN D 25 HYDROXY: CPT

## 2021-01-29 PROCEDURE — 84436 ASSAY OF TOTAL THYROXINE: CPT

## 2021-01-29 PROCEDURE — 80053 COMPREHEN METABOLIC PANEL: CPT

## 2021-01-29 PROCEDURE — 84443 ASSAY THYROID STIM HORMONE: CPT

## 2021-01-29 PROCEDURE — 85025 COMPLETE CBC W/AUTO DIFF WBC: CPT

## 2021-01-29 PROCEDURE — 82607 VITAMIN B-12: CPT

## 2021-01-29 PROCEDURE — 80061 LIPID PANEL: CPT

## 2021-01-29 PROCEDURE — 36415 COLL VENOUS BLD VENIPUNCTURE: CPT

## 2021-02-01 LAB
LAB AP GYN PRIMARY INTERPRETATION: NORMAL
LAB AP LMP: NORMAL
Lab: NORMAL

## 2022-04-15 ENCOUNTER — HOSPITAL ENCOUNTER (EMERGENCY)
Facility: HOSPITAL | Age: 42
Discharge: HOME | End: 2022-04-15
Attending: EMERGENCY MEDICINE | Admitting: EMERGENCY MEDICINE
Payer: COMMERCIAL

## 2022-04-15 VITALS
SYSTOLIC BLOOD PRESSURE: 161 MMHG | HEART RATE: 106 BPM | DIASTOLIC BLOOD PRESSURE: 117 MMHG | RESPIRATION RATE: 22 BRPM | OXYGEN SATURATION: 92 % | TEMPERATURE: 97.5 F

## 2022-04-15 DIAGNOSIS — J45.901 ASTHMA EXACERBATION: ICD-10-CM

## 2022-04-15 DIAGNOSIS — J45.901 ACUTE ASTHMA EXACERBATION: Primary | ICD-10-CM

## 2022-04-15 PROCEDURE — 94640 AIRWAY INHALATION TREATMENT: CPT

## 2022-04-15 PROCEDURE — 99284 EMERGENCY DEPT VISIT MOD MDM: CPT | Performed by: EMERGENCY MEDICINE

## 2022-04-15 PROCEDURE — 99284 EMERGENCY DEPT VISIT MOD MDM: CPT

## 2022-04-15 RX ORDER — METHYLPREDNISOLONE SOD SUCC 125 MG
1 VIAL (EA) INJECTION ONCE
Status: COMPLETED | OUTPATIENT
Start: 2022-04-15 | End: 2022-04-15

## 2022-04-15 RX ORDER — PREDNISONE 20 MG/1
20 TABLET ORAL 2 TIMES DAILY
Qty: 10 TABLET | Refills: 0 | Status: SHIPPED | OUTPATIENT
Start: 2022-04-15

## 2022-04-15 RX ORDER — IPRATROPIUM BROMIDE AND ALBUTEROL SULFATE .5; 3 MG/3ML; MG/3ML
1 SOLUTION RESPIRATORY (INHALATION) ONCE
Status: COMPLETED | OUTPATIENT
Start: 2022-04-15 | End: 2022-04-15

## 2022-04-15 RX ORDER — ALBUTEROL SULFATE 90 UG/1
2 AEROSOL, METERED RESPIRATORY (INHALATION) EVERY 4 HOURS PRN
Qty: 18 G | Refills: 0 | Status: SHIPPED | OUTPATIENT
Start: 2022-04-15

## 2022-04-15 RX ORDER — ALBUTEROL SULFATE 2.5 MG/3ML
5 SOLUTION RESPIRATORY (INHALATION) ONCE
Status: COMPLETED | OUTPATIENT
Start: 2022-04-15 | End: 2022-04-15

## 2022-04-15 RX ORDER — ALBUTEROL SULFATE 2.5 MG/3ML
1 SOLUTION RESPIRATORY (INHALATION) ONCE
Status: COMPLETED | OUTPATIENT
Start: 2022-04-15 | End: 2022-04-15

## 2022-04-15 RX ADMIN — ALBUTEROL SULFATE 5 MG: 2.5 SOLUTION RESPIRATORY (INHALATION) at 10:31

## 2022-04-15 NOTE — ED PROVIDER NOTES
History  Chief Complaint   Patient presents with    Asthma     worsenign asthma starting last night no relief from home treatments  patient received solu medrol, 1 duo, and 1 albuterol pre hosp with some relief  vineet cortes has been admitted and intubated in past     47 yo F with asthma, required intubation over 2 year ago in [facility] at this point, and previously required frequent steroids, but she has not required them for an interval over 1 year at this point, starting last night she began to experience increasing dyspnea, wheezing, not responding to home regimen  She suspects the trigger is 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 Taking?    Multiple Vitamin (MULTIVITAMIN) tablet  Self Yes No   Sig: Take 1 tablet by mouth daily   Restasis 0 05 % ophthalmic emulsion   Yes No   Vraylar 1 5 MG capsule   Yes No   albuterol (PROVENTIL HFA,VENTOLIN HFA) 90 mcg/act inhaler  Self Yes No   Sig: Inhale 2 puffs every 6 (six) hours as needed for wheezing   albuterol (PROVENTIL HFA,VENTOLIN HFA) 90 mcg/act inhaler   No No   Sig: Inhale 2 puffs every 4 (four) hours as needed for wheezing (or cough)   albuterol (PROVENTIL HFA,VENTOLIN HFA) 90 mcg/act inhaler   No No   Sig: Inhale 2 puffs every 4 (four) hours as needed for wheezing (or cough)   cetirizine (ZyrTEC) 10 mg tablet   No No   Sig: Take 1 tablet (10 mg total) by mouth daily   diclofenac sodium (VOLTAREN) 1 %  Self No No   Sig: Apply 2 g topically 4 (four) times a day   Patient not taking: Reported on 2/27/2020   ferrous sulfate 325 (65 Fe) mg tablet   No No   Sig: Take 1 tablet (325 mg total) by mouth daily with breakfast   Patient not taking: Reported on 1/26/2021   predniSONE 10 mg tablet   No No Sig: START 4 po daily on days 1-5  Then 3 po on Day 6   2 po on Day 7   1 po on Day 8   1/2 po on Day 9 and 10  STOP   prednisoLONE acetate (PRED FORTE) 1 % ophthalmic suspension   Yes No      Facility-Administered Medications: None       Past Medical History:   Diagnosis Date    Asthma     Bipolar disorder ([facility] [address] )        Past Surgical History:   Procedure Laterality Date    NO PAST SURGERIES         Family History   Problem Relation Age of Onset    Asthma Family      I have reviewed and agree with the history as documented  E-Cigarette/Vaping    E-Cigarette Use Never User      E-Cigarette/Vaping Substances    Nicotine No     THC No     CBD No     Flavoring No     Other No     Unknown No      Social History     Tobacco Use    Smoking status: Current Some Day Smoker     Packs/day: 1 50     Types: Cigarettes    Smokeless tobacco: Never Used   Vaping Use    Vaping Use: Never used   Substance Use Topics    Alcohol use: Yes     Comment: Weekends    Drug use: Yes     Types: Marijuana     Comment: occasional       Review of Systems   Constitutional: Negative for appetite change, chills and fever  HENT: Negative for sore throat  Respiratory: Positive for shortness of breath and wheezing  Negative for cough  Cardiovascular: Negative for chest pain and palpitations  Gastrointestinal: Negative for abdominal pain, diarrhea, nausea and vomiting  Genitourinary: Negative for dysuria and hematuria  Musculoskeletal: Negative for neck pain  Skin: Negative for rash  Neurological: Negative for dizziness, weakness and headaches  Psychiatric/Behavioral: Negative for suicidal ideas  All other systems reviewed and are negative  Physical Exam  Physical Exam  Vitals and nursing note reviewed  Constitutional:       General: She is not in acute distress  Appearance: She is well-developed  She is not diaphoretic  HENT:      Head: Normocephalic and atraumatic        Right Ear: External ear normal       Left Ear: External ear normal       Nose: Nose normal    Eyes:      Conjunctiva/sclera: Conjunctivae normal       Pupils: Pupils are equal, round, and reactive to light  Cardiovascular:      Rate and Rhythm: Normal rate and regular rhythm  Pulmonary:      Effort: Pulmonary effort is normal  Tachypnea and prolonged expiration present  Breath sounds: Wheezing present  No decreased breath sounds  Abdominal:      Palpations: Abdomen is soft  Musculoskeletal:         General: Normal range of motion  Cervical back: Normal range of motion and neck supple  Skin:     General: Skin is warm and dry  Capillary Refill: Capillary refill takes less than 2 seconds  Findings: No rash  Neurological:      Mental Status: She is alert and oriented to person, place, and time  Gait: Gait normal    Psychiatric:         Behavior: Behavior normal          Thought Content:  Thought content normal          Judgment: Judgment normal          Vital Signs  ED Triage Vitals   Temperature Pulse Respirations Blood Pressure SpO2   04/15/22 0850 04/15/22 0850 04/15/22 0850 04/15/22 0850 04/15/22 0850   97 5 °F (36 4 °C) (!) 108 22 (!) 178/116 94 %      Temp Source Heart Rate Source Patient Position - Orthostatic VS BP Location FiO2 (%)   04/15/22 0850 04/15/22 0850 04/15/22 1107 04/15/22 1107 --   Oral Monitor Lying Left arm       Pain Score       --                  Vitals:    04/15/22 0850 04/15/22 1107   BP: (!) 178/116 (!) 161/117   Pulse: (!) 108 (!) 106   Patient Position - Orthostatic VS:  Lying         Visual Acuity      ED Medications  Medications   albuterol (FOR EMS ONLY) (2 5 mg/3 mL) 0 083 % inhalation solution 2 5 mg (0 mg Does not apply Given to EMS 4/15/22 0900)   ipratropium-albuterol (FOR EMS ONLY) (DUO-NEB) 0 5-2 5 mg/3 mL inhalation solution 3 mL (0 mL Does not apply Given to EMS 4/15/22 0900)   methylPREDNISolone sodium succinate (FOR EMS ONLY) (Solu-MEDROL) 125 MG injection 125 mg (0 mg Does not apply Given to EMS 4/15/22 0900)   albuterol inhalation solution 5 mg (5 mg Nebulization Given 4/15/22 1031)       Diagnostic Studies  Results Reviewed     None                 No orders to display              Procedures  Procedures         ED Course  ED Course as of 04/15/22 1350   Fri Apr 15, 2022   1006 She reports subjective improvement, no wheezing, but she still has some accessory movement  She needs another breathing treatment  MDM    Disposition  Final diagnoses:   Acute asthma exacerbation     Time reflects when diagnosis was documented in both MDM as applicable and the Disposition within this note     Time User Action Codes Description Comment    4/15/2022 10:37 AM Chloe ROWLAND Add [J45 901] Acute asthma exacerbation     4/15/2022 10:38 AM Loida Lee Add [J45 901] Asthma exacerbation       ED Disposition     ED Disposition Condition Date/Time Comment    Discharge Good Fri Apr 15, 2022 10:37 AM Walt Sánchez discharge to home/self care  Follow-up Information     Follow up With Specialties Details Why Contact Info Additional [facility] Call  For followup, If symptoms worsen [address], [address] [facility] [address]  [address], [address], [address] [place], [place], [address]          Discharge Medication List as of 4/15/2022 10:39 AM      CONTINUE these medications which have CHANGED    Details   !! albuterol (PROVENTIL HFA,VENTOLIN HFA) 90 mcg/act inhaler Inhale 2 puffs every 4 (four) hours as needed for wheezing (or cough), Starting Fri 4/15/2022, Normal      predniSONE 20 mg tablet Take 1 tablet (20 mg total) by mouth 2 (two) times a day, Starting Fri 4/15/2022, Normal       !! - Potential duplicate medications found  Please discuss with provider  CONTINUE these medications which have NOT CHANGED    Details   !! albuterol (PROVENTIL HFA,VENTOLIN HFA) 90 mcg/act inhaler Inhale 2 puffs every 6 (six) hours as needed for wheezing, Historical Med      cetirizine (ZyrTEC) 10 mg tablet Take 1 tablet (10 mg total) by mouth daily, Starting Sat 3/21/2020, Until Sun 3/21/2021, Print      diclofenac sodium (VOLTAREN) 1 % Apply 2 g topically 4 (four) times a day, Starting Wed 10/9/2019, Print      ferrous sulfate 325 (65 Fe) mg tablet Take 1 tablet (325 mg total) by mouth daily with breakfast, Starting Tue 3/17/2020, Print      Multiple Vitamin (MULTIVITAMIN) tablet Take 1 tablet by mouth daily, Historical Med      prednisoLONE acetate (PRED FORTE) 1 % ophthalmic suspension Starting Mon 10/5/2020, Historical Med      Restasis 0 05 % ophthalmic emulsion Starting Mon 11/16/2020, Historical Med      Vraylar 1 5 MG capsule Starting Tue 12/29/2020, Historical Med       !! - Potential duplicate medications found  Please discuss with provider  No discharge procedures on file      PDMP Review     None          ED Provider  Electronically Signed by           Therese Smith MD  04/15/22 5667

## 2022-04-15 NOTE — DISCHARGE INSTRUCTIONS
Asthma   WHAT YOU NEED TO KNOW:   Asthma is a lung disease that makes breathing difficult  Chronic inflammation and reactions to triggers narrow the airways in the lungs  Asthma can become life-threatening if it is not managed  DISCHARGE INSTRUCTIONS:   Return to the emergency department if:   You have severe shortness of breath  Your lips or nails turn blue or gray  The skin around your neck and ribs pulls in with each breath  You have shortness of breath, even after you take your short-term medicine as directed  Your peak flow numbers are worsening  Contact your healthcare provider if:   You run out of medicine before your next refill is due  Your symptoms get worse  You need to take more medicine than usual to control your symptoms  You have questions or concerns about your condition or care  Manage other health conditions , such as allergies, acid reflux, and sleep apnea  Identify and avoid triggers  These may include pets, dust mites, mold, and cockroaches  Do not smoke or be around others who smoke  Nicotine and other chemicals in cigarettes and cigars can cause lung damage  Ask your healthcare provider for information if you currently smoke and need help to quit  E-cigarettes or smokeless tobacco still contain nicotine  Talk to your healthcare provider before you use these products  The flu can make your asthma worse  You may need a yearly flu shot

## 2022-04-15 NOTE — ED NOTES
Patient resting comfortably  Given warm blankets and lights dim       Bethany Washington  04/15/22 2793 The patient is a 57y Female complaining of arm pain/injury.

## 2022-07-24 ENCOUNTER — HOSPITAL ENCOUNTER (EMERGENCY)
Facility: HOSPITAL | Age: 42
Discharge: HOME | End: 2022-07-24
Attending: EMERGENCY MEDICINE | Admitting: EMERGENCY MEDICINE
Payer: COMMERCIAL

## 2022-07-24 VITALS
RESPIRATION RATE: 16 BRPM | HEART RATE: 112 BPM | SYSTOLIC BLOOD PRESSURE: 142 MMHG | DIASTOLIC BLOOD PRESSURE: 92 MMHG | TEMPERATURE: 99.2 F | OXYGEN SATURATION: 93 %

## 2022-07-24 DIAGNOSIS — F43.0 EMOTIONAL CRISIS, ACUTE REACTION TO STRESS: Primary | ICD-10-CM

## 2022-07-24 PROCEDURE — 99284 EMERGENCY DEPT VISIT MOD MDM: CPT | Performed by: EMERGENCY MEDICINE

## 2022-07-24 PROCEDURE — 99283 EMERGENCY DEPT VISIT LOW MDM: CPT

## 2022-07-24 NOTE — DISCHARGE INSTRUCTIONS
Go ahead and take your dose of Vraylar this evening, and then resume your usual dose tomorrow  Follow up with your regular doctor and continue your medications as prescribed  Please return if you are feeling overwhelmed or if you feel unsafe toward yourself or others

## 2022-07-24 NOTE — ED NOTES
With patient's permission, patient's  coming back to sit at bedside in [facility]        Mariano Rock RN  07/24/22 1499

## 2022-07-24 NOTE — ED PROVIDER NOTES
History  Chief Complaint   Patient presents with    Psychiatric Evaluation     Per EMS, patient was at work when she starting having outbursts and yelling and running in and out of workplace  Duane Rumple called family who called EMS  Per EMS, patient's  told them that patient has bipolar disorder and whenever this happens she is not taking her medications  Patient reports here that she is compliant with Desiree Life for her bipolar  Patient denies SI/HI and AH/VH  Patient reports, "I got a little emotional, it was a long day, that's all " Patient calm and cooperative  47 yo F brought by EMS from [facility], for evaluation after she experienced behavioral disturbance, apparently shouting and yelling, and was hyperactive  Her  was contacted, who happened to be on the way to pick her up, and decision was made to activate EMS  No report was given to EMS of any SI or HI  On arrival in ED, she affirms she is managed for chronic bipolar disorder, gets her follow up through [facility]  She says she became emotional, triggered by stress, missing her family, and continues to deny SI/HI  She admits to at least missing her dose of Desiree Life today, but insists she has been taking it as prescribed  Her  arrived and at bedside said he thought she has been more labile the last few days and cannot not corroborated her medication compliance  However, he denies that she has many any threats of harm to self or others  She has access to her therapist   She does not feel she needs to be hospitalized  I reviewed her medication, and it is reasonable for her to take the the dose she said she missed today, and then resume her medication tomorrow  She and her  understand that they can seek help either by 911 or county crisis if she feels unsafe  History provided by:  Patient      Prior to Admission Medications   Prescriptions Last Dose Informant Patient Reported? Taking?    Multiple Vitamin (MULTIVITAMIN) tablet  Self Yes No   Sig: Take 1 tablet by mouth daily   Restasis 0 05 % ophthalmic emulsion   Yes No   Vraylar 1 5 MG capsule   Yes No   albuterol (PROVENTIL HFA,VENTOLIN HFA) 90 mcg/act inhaler  Self Yes No   Sig: Inhale 2 puffs every 6 (six) hours as needed for wheezing   albuterol (PROVENTIL HFA,VENTOLIN HFA) 90 mcg/act inhaler   No No   Sig: Inhale 2 puffs every 4 (four) hours as needed for wheezing (or cough)   cetirizine (ZyrTEC) 10 mg tablet   No No   Sig: Take 1 tablet (10 mg total) by mouth daily   diclofenac sodium (VOLTAREN) 1 %  Self No No   Sig: Apply 2 g topically 4 (four) times a day   Patient not taking: Reported on 2/27/2020   ferrous sulfate 325 (65 Fe) mg tablet   No No   Sig: Take 1 tablet (325 mg total) by mouth daily with breakfast   Patient not taking: Reported on 1/26/2021   predniSONE 20 mg tablet   No No   Sig: Take 1 tablet (20 mg total) by mouth 2 (two) times a day   prednisoLONE acetate (PRED FORTE) 1 % ophthalmic suspension   Yes No      Facility-Administered Medications: None       Past Medical History:   Diagnosis Date    Asthma     Bipolar disorder (HCC)        Past Surgical History:   Procedure Laterality Date    NO PAST SURGERIES         Family History   Problem Relation Age of Onset    Asthma Family      I have reviewed and agree with the history as documented  E-Cigarette/Vaping    E-Cigarette Use Never User      E-Cigarette/Vaping Substances    Nicotine No     THC No     CBD No     Flavoring No     Other No     Unknown No      Social History     Tobacco Use    Smoking status: Current Some Day Smoker     Packs/day: 1 50     Types: Cigarettes    Smokeless tobacco: Never Used   Vaping Use    Vaping Use: Never used   Substance Use Topics    Alcohol use: Yes     Comment: Weekends    Drug use: Yes     Types: Marijuana     Comment: occasional       Review of Systems   Constitutional: Negative for appetite change, chills and fever  HENT: Negative for sore throat  Respiratory: Negative for cough, shortness of breath and wheezing  Cardiovascular: Negative for chest pain and palpitations  Gastrointestinal: Negative for abdominal pain, diarrhea, nausea and vomiting  Genitourinary: Negative for dysuria and hematuria  Musculoskeletal: Negative for neck pain  Skin: Negative for rash  Neurological: Negative for dizziness, weakness and headaches  Psychiatric/Behavioral: Negative for suicidal ideas  The patient is nervous/anxious  All other systems reviewed and are negative  Physical Exam  Physical Exam  Vitals and nursing note reviewed  Constitutional:       General: She is not in acute distress  Appearance: She is well-developed  She is not diaphoretic  HENT:      Head: Normocephalic and atraumatic  Right Ear: External ear normal       Left Ear: External ear normal       Nose: Nose normal    Eyes:      Conjunctiva/sclera: Conjunctivae normal       Pupils: Pupils are equal, round, and reactive to light  Cardiovascular:      Rate and Rhythm: Normal rate and regular rhythm  Pulmonary:      Effort: Pulmonary effort is normal    Abdominal:      Palpations: Abdomen is soft  Musculoskeletal:         General: Normal range of motion  Cervical back: Normal range of motion and neck supple  Skin:     General: Skin is warm and dry  Capillary Refill: Capillary refill takes less than 2 seconds  Findings: No rash  Neurological:      Mental Status: She is alert and oriented to person, place, and time  Gait: Gait normal    Psychiatric:         Behavior: Behavior normal          Thought Content:  Thought content normal          Judgment: Judgment normal          Vital Signs  ED Triage Vitals [07/24/22 1652]   Temperature Pulse Respirations Blood Pressure SpO2   99 2 °F (37 3 °C) (!) 112 16 142/92 93 %      Temp Source Heart Rate Source Patient Position - Orthostatic VS BP Location FiO2 (%)   Oral Monitor Sitting Right arm --      Pain Score       No Pain           Vitals:    07/24/22 1652   BP: 142/92   Pulse: (!) 112   Patient Position - Orthostatic VS: Sitting         Visual Acuity      ED Medications  Medications - No data to display    Diagnostic Studies  Results Reviewed     None                 No orders to display              Procedures  Procedures         ED Course                               SBIRT 20yo+    Flowsheet Row Most Recent Value   SBIRT (23 yo +)    In order to provide better care to our patients, we are screening all of our patients for alcohol and drug use  Would it be okay to ask you these screening questions? Yes Filed at: 07/24/2022 1654   Initial Alcohol Screen: US AUDIT-C     1  How often do you have a drink containing alcohol? 0 Filed at: 07/24/2022 1654   2  How many drinks containing alcohol do you have on a typical day you are drinking? 0 Filed at: 07/24/2022 1654   3b  FEMALE Any Age, or MALE 65+: How often do you have 4 or more drinks on one occassion? 0 Filed at: 07/24/2022 1654   Audit-C Score 0 Filed at: 07/24/2022 1654   HILDA: How many times in the past year have you    Used an illegal drug or used a prescription medication for non-medical reasons? Never Filed at: 07/24/2022 1654                    MDM    Disposition  Final diagnoses:   Emotional crisis, acute reaction to stress     Time reflects when diagnosis was documented in both MDM as applicable and the Disposition within this note     Time User Action Codes Description Comment    7/24/2022  5:12 PM Juan Grim Add [F43 0] Emotional crisis, acute reaction to stress       ED Disposition     ED Disposition   Discharge    Condition   Good    Date/Time   Sun Jul 24, 2022  5:12 PM    Thea Zuniga discharge to home/self care                 Follow-up Information     Follow up With Specialties Details Why Contact Info    Psychiatrist, Therapist  Call  For followup           Discharge Medication List as of 7/24/2022  5:13 PM      CONTINUE these medications which have NOT CHANGED    Details   !! albuterol (PROVENTIL HFA,VENTOLIN HFA) 90 mcg/act inhaler Inhale 2 puffs every 6 (six) hours as needed for wheezing, Historical Med      !! albuterol (PROVENTIL HFA,VENTOLIN HFA) 90 mcg/act inhaler Inhale 2 puffs every 4 (four) hours as needed for wheezing (or cough), Starting Fri 4/15/2022, Normal      cetirizine (ZyrTEC) 10 mg tablet Take 1 tablet (10 mg total) by mouth daily, Starting Sat 3/21/2020, Until Sun 3/21/2021, Print      diclofenac sodium (VOLTAREN) 1 % Apply 2 g topically 4 (four) times a day, Starting Wed 10/9/2019, Print      ferrous sulfate 325 (65 Fe) mg tablet Take 1 tablet (325 mg total) by mouth daily with breakfast, Starting Tue 3/17/2020, Print      Multiple Vitamin (MULTIVITAMIN) tablet Take 1 tablet by mouth daily, Historical Med      prednisoLONE acetate (PRED FORTE) 1 % ophthalmic suspension Starting Mon 10/5/2020, Historical Med      predniSONE 20 mg tablet Take 1 tablet (20 mg total) by mouth 2 (two) times a day, Starting Fri 4/15/2022, Normal      Restasis 0 05 % ophthalmic emulsion Starting Mon 11/16/2020, Historical Med      Vraylar 1 5 MG capsule Starting Tue 12/29/2020, Historical Med       !! - Potential duplicate medications found  Please discuss with provider  No discharge procedures on file      PDMP Review     None          ED Provider  Electronically Signed by           Kianna Calero MD  07/24/22 7083

## 2023-08-10 ENCOUNTER — APPOINTMENT (OUTPATIENT)
Dept: LAB | Facility: CLINIC | Age: 43
End: 2023-08-10
Payer: MEDICARE

## 2023-08-10 DIAGNOSIS — F31.9 DEPRESSED BIPOLAR I DISORDER (HCC): Primary | ICD-10-CM

## 2023-08-10 LAB
ALBUMIN SERPL BCP-MCNC: 3.4 G/DL (ref 3.5–5)
ALP SERPL-CCNC: 90 U/L (ref 46–116)
ALT SERPL W P-5'-P-CCNC: 21 U/L (ref 12–78)
ANION GAP SERPL CALCULATED.3IONS-SCNC: 1 MMOL/L
AST SERPL W P-5'-P-CCNC: 10 U/L (ref 5–45)
BASOPHILS # BLD AUTO: 0.04 THOUSANDS/ÂΜL (ref 0–0.1)
BASOPHILS NFR BLD AUTO: 1 % (ref 0–1)
BILIRUB SERPL-MCNC: 0.42 MG/DL (ref 0.2–1)
BUN SERPL-MCNC: 10 MG/DL (ref 5–25)
CALCIUM ALBUM COR SERPL-MCNC: 9 MG/DL (ref 8.3–10.1)
CALCIUM SERPL-MCNC: 8.5 MG/DL (ref 8.3–10.1)
CHLORIDE SERPL-SCNC: 106 MMOL/L (ref 96–108)
CHOLEST SERPL-MCNC: 153 MG/DL
CO2 SERPL-SCNC: 30 MMOL/L (ref 21–32)
CREAT SERPL-MCNC: 0.75 MG/DL (ref 0.6–1.3)
EOSINOPHIL # BLD AUTO: 0.22 THOUSAND/ÂΜL (ref 0–0.61)
EOSINOPHIL NFR BLD AUTO: 4 % (ref 0–6)
ERYTHROCYTE [DISTWIDTH] IN BLOOD BY AUTOMATED COUNT: 13.4 % (ref 11.6–15.1)
GFR SERPL CREATININE-BSD FRML MDRD: 100 ML/MIN/1.73SQ M
GLUCOSE P FAST SERPL-MCNC: 153 MG/DL (ref 65–99)
HCT VFR BLD AUTO: 43.4 % (ref 34.8–46.1)
HDLC SERPL-MCNC: 55 MG/DL
HGB BLD-MCNC: 14.1 G/DL (ref 11.5–15.4)
IMM GRANULOCYTES # BLD AUTO: 0.01 THOUSAND/UL (ref 0–0.2)
IMM GRANULOCYTES NFR BLD AUTO: 0 % (ref 0–2)
LDLC SERPL CALC-MCNC: 87 MG/DL (ref 0–100)
LYMPHOCYTES # BLD AUTO: 2.1 THOUSANDS/ÂΜL (ref 0.6–4.47)
LYMPHOCYTES NFR BLD AUTO: 34 % (ref 14–44)
MCH RBC QN AUTO: 31.2 PG (ref 26.8–34.3)
MCHC RBC AUTO-ENTMCNC: 32.5 G/DL (ref 31.4–37.4)
MCV RBC AUTO: 96 FL (ref 82–98)
MONOCYTES # BLD AUTO: 0.25 THOUSAND/ÂΜL (ref 0.17–1.22)
MONOCYTES NFR BLD AUTO: 4 % (ref 4–12)
NEUTROPHILS # BLD AUTO: 3.59 THOUSANDS/ÂΜL (ref 1.85–7.62)
NEUTS SEG NFR BLD AUTO: 57 % (ref 43–75)
NRBC BLD AUTO-RTO: 0 /100 WBCS
PLATELET # BLD AUTO: 302 THOUSANDS/UL (ref 149–390)
PMV BLD AUTO: 10.8 FL (ref 8.9–12.7)
POTASSIUM SERPL-SCNC: 3.8 MMOL/L (ref 3.5–5.3)
PROT SERPL-MCNC: 7.6 G/DL (ref 6.4–8.4)
RBC # BLD AUTO: 4.52 MILLION/UL (ref 3.81–5.12)
SODIUM SERPL-SCNC: 137 MMOL/L (ref 135–147)
TRIGL SERPL-MCNC: 57 MG/DL
WBC # BLD AUTO: 6.21 THOUSAND/UL (ref 4.31–10.16)

## 2023-08-10 PROCEDURE — 36415 COLL VENOUS BLD VENIPUNCTURE: CPT

## 2023-08-10 PROCEDURE — 85025 COMPLETE CBC W/AUTO DIFF WBC: CPT

## 2023-08-10 PROCEDURE — 80061 LIPID PANEL: CPT

## 2023-08-10 PROCEDURE — 80053 COMPREHEN METABOLIC PANEL: CPT

## 2024-03-09 ENCOUNTER — HOSPITAL ENCOUNTER (INPATIENT)
Facility: HOSPITAL | Age: 44
LOS: 3 days | Discharge: LEFT BEFORE DISCHARGE | DRG: 141 | End: 2024-03-12
Attending: EMERGENCY MEDICINE | Admitting: INTERNAL MEDICINE
Payer: MEDICARE

## 2024-03-09 ENCOUNTER — APPOINTMENT (EMERGENCY)
Dept: RADIOLOGY | Facility: HOSPITAL | Age: 44
DRG: 141 | End: 2024-03-09
Payer: MEDICARE

## 2024-03-09 DIAGNOSIS — J45.901 ACUTE ASTHMA EXACERBATION: Primary | ICD-10-CM

## 2024-03-09 DIAGNOSIS — R09.02 HYPOXIA: ICD-10-CM

## 2024-03-09 PROBLEM — F39 MOOD DISORDER (HCC): Status: ACTIVE | Noted: 2020-02-28

## 2024-03-09 PROBLEM — R03.0 ELEVATED BLOOD PRESSURE READING WITHOUT DIAGNOSIS OF HYPERTENSION: Status: ACTIVE | Noted: 2024-03-09

## 2024-03-09 LAB
ANION GAP SERPL CALCULATED.3IONS-SCNC: 7 MMOL/L
ATRIAL RATE: 108 BPM
BASOPHILS # BLD AUTO: 0.04 THOUSANDS/ÂΜL (ref 0–0.1)
BASOPHILS NFR BLD AUTO: 1 % (ref 0–1)
BUN SERPL-MCNC: 9 MG/DL (ref 5–25)
CALCIUM SERPL-MCNC: 8.7 MG/DL (ref 8.4–10.2)
CHLORIDE SERPL-SCNC: 103 MMOL/L (ref 96–108)
CO2 SERPL-SCNC: 26 MMOL/L (ref 21–32)
CREAT SERPL-MCNC: 0.58 MG/DL (ref 0.6–1.3)
EOSINOPHIL # BLD AUTO: 0.2 THOUSAND/ÂΜL (ref 0–0.61)
EOSINOPHIL NFR BLD AUTO: 2 % (ref 0–6)
ERYTHROCYTE [DISTWIDTH] IN BLOOD BY AUTOMATED COUNT: 14 % (ref 11.6–15.1)
GFR SERPL CREATININE-BSD FRML MDRD: 114 ML/MIN/1.73SQ M
GLUCOSE SERPL-MCNC: 167 MG/DL (ref 65–140)
HCG SERPL QL: NEGATIVE
HCT VFR BLD AUTO: 41.4 % (ref 34.8–46.1)
HGB BLD-MCNC: 13.6 G/DL (ref 11.5–15.4)
IMM GRANULOCYTES # BLD AUTO: 0.03 THOUSAND/UL (ref 0–0.2)
IMM GRANULOCYTES NFR BLD AUTO: 0 % (ref 0–2)
LYMPHOCYTES # BLD AUTO: 1.2 THOUSANDS/ÂΜL (ref 0.6–4.47)
LYMPHOCYTES NFR BLD AUTO: 14 % (ref 14–44)
MCH RBC QN AUTO: 31.1 PG (ref 26.8–34.3)
MCHC RBC AUTO-ENTMCNC: 32.9 G/DL (ref 31.4–37.4)
MCV RBC AUTO: 95 FL (ref 82–98)
MONOCYTES # BLD AUTO: 0.47 THOUSAND/ÂΜL (ref 0.17–1.22)
MONOCYTES NFR BLD AUTO: 6 % (ref 4–12)
NEUTROPHILS # BLD AUTO: 6.65 THOUSANDS/ÂΜL (ref 1.85–7.62)
NEUTS SEG NFR BLD AUTO: 77 % (ref 43–75)
NRBC BLD AUTO-RTO: 0 /100 WBCS
P AXIS: 88 DEGREES
PLATELET # BLD AUTO: 321 THOUSANDS/UL (ref 149–390)
PMV BLD AUTO: 10.2 FL (ref 8.9–12.7)
POTASSIUM SERPL-SCNC: 3.3 MMOL/L (ref 3.5–5.3)
PR INTERVAL: 124 MS
QRS AXIS: 81 DEGREES
QRSD INTERVAL: 72 MS
QT INTERVAL: 318 MS
QTC INTERVAL: 426 MS
RBC # BLD AUTO: 4.38 MILLION/UL (ref 3.81–5.12)
SODIUM SERPL-SCNC: 136 MMOL/L (ref 135–147)
T WAVE AXIS: 78 DEGREES
VENTRICULAR RATE: 108 BPM
WBC # BLD AUTO: 8.59 THOUSAND/UL (ref 4.31–10.16)

## 2024-03-09 PROCEDURE — 94644 CONT INHLJ TX 1ST HOUR: CPT

## 2024-03-09 PROCEDURE — 85025 COMPLETE CBC W/AUTO DIFF WBC: CPT | Performed by: EMERGENCY MEDICINE

## 2024-03-09 PROCEDURE — 96365 THER/PROPH/DIAG IV INF INIT: CPT

## 2024-03-09 PROCEDURE — 99223 1ST HOSP IP/OBS HIGH 75: CPT | Performed by: INTERNAL MEDICINE

## 2024-03-09 PROCEDURE — 94640 AIRWAY INHALATION TREATMENT: CPT

## 2024-03-09 PROCEDURE — 93005 ELECTROCARDIOGRAM TRACING: CPT

## 2024-03-09 PROCEDURE — 80048 BASIC METABOLIC PNL TOTAL CA: CPT | Performed by: EMERGENCY MEDICINE

## 2024-03-09 PROCEDURE — 93010 ELECTROCARDIOGRAM REPORT: CPT | Performed by: INTERNAL MEDICINE

## 2024-03-09 PROCEDURE — 36415 COLL VENOUS BLD VENIPUNCTURE: CPT | Performed by: EMERGENCY MEDICINE

## 2024-03-09 PROCEDURE — 99285 EMERGENCY DEPT VISIT HI MDM: CPT

## 2024-03-09 PROCEDURE — 71046 X-RAY EXAM CHEST 2 VIEWS: CPT

## 2024-03-09 PROCEDURE — 94760 N-INVAS EAR/PLS OXIMETRY 1: CPT

## 2024-03-09 PROCEDURE — 84703 CHORIONIC GONADOTROPIN ASSAY: CPT | Performed by: INTERNAL MEDICINE

## 2024-03-09 PROCEDURE — 94664 DEMO&/EVAL PT USE INHALER: CPT

## 2024-03-09 PROCEDURE — 96375 TX/PRO/DX INJ NEW DRUG ADDON: CPT

## 2024-03-09 PROCEDURE — 99291 CRITICAL CARE FIRST HOUR: CPT | Performed by: EMERGENCY MEDICINE

## 2024-03-09 RX ORDER — ACETAMINOPHEN 325 MG/1
650 TABLET ORAL EVERY 4 HOURS PRN
Status: DISCONTINUED | OUTPATIENT
Start: 2024-03-09 | End: 2024-03-12 | Stop reason: HOSPADM

## 2024-03-09 RX ORDER — TERBUTALINE SULFATE 1 MG/ML
0.25 INJECTION, SOLUTION SUBCUTANEOUS ONCE
Status: COMPLETED | OUTPATIENT
Start: 2024-03-10 | End: 2024-03-10

## 2024-03-09 RX ORDER — OXYCODONE HYDROCHLORIDE 5 MG/1
5 TABLET ORAL EVERY 4 HOURS PRN
Status: DISCONTINUED | OUTPATIENT
Start: 2024-03-09 | End: 2024-03-12 | Stop reason: HOSPADM

## 2024-03-09 RX ORDER — LOSARTAN POTASSIUM 25 MG/1
25 TABLET ORAL DAILY
Status: DISCONTINUED | OUTPATIENT
Start: 2024-03-09 | End: 2024-03-09

## 2024-03-09 RX ORDER — LEVALBUTEROL INHALATION SOLUTION 1.25 MG/3ML
1.25 SOLUTION RESPIRATORY (INHALATION)
Status: DISCONTINUED | OUTPATIENT
Start: 2024-03-09 | End: 2024-03-10

## 2024-03-09 RX ORDER — POTASSIUM CHLORIDE 1500 MG/1
40 TABLET, EXTENDED RELEASE ORAL ONCE
Status: COMPLETED | OUTPATIENT
Start: 2024-03-09 | End: 2024-03-09

## 2024-03-09 RX ORDER — ALBUTEROL SULFATE 5 MG/ML
10 SOLUTION RESPIRATORY (INHALATION) ONCE
Status: COMPLETED | OUTPATIENT
Start: 2024-03-09 | End: 2024-03-09

## 2024-03-09 RX ORDER — ONDANSETRON 2 MG/ML
4 INJECTION INTRAMUSCULAR; INTRAVENOUS EVERY 4 HOURS PRN
Status: DISCONTINUED | OUTPATIENT
Start: 2024-03-09 | End: 2024-03-12 | Stop reason: HOSPADM

## 2024-03-09 RX ORDER — ALBUTEROL SULFATE 0.83 MG/ML
5 SOLUTION RESPIRATORY (INHALATION) ONCE
Status: COMPLETED | OUTPATIENT
Start: 2024-03-09 | End: 2024-03-09

## 2024-03-09 RX ORDER — LEVALBUTEROL INHALATION SOLUTION 1.25 MG/3ML
1.25 SOLUTION RESPIRATORY (INHALATION)
Status: DISCONTINUED | OUTPATIENT
Start: 2024-03-09 | End: 2024-03-09

## 2024-03-09 RX ORDER — BUDESONIDE AND FORMOTEROL FUMARATE DIHYDRATE 160; 4.5 UG/1; UG/1
2 AEROSOL RESPIRATORY (INHALATION) 2 TIMES DAILY
COMMUNITY

## 2024-03-09 RX ORDER — HYDROCODONE BITARTRATE AND HOMATROPINE METHYLBROMIDE ORAL SOLUTION 5; 1.5 MG/5ML; MG/5ML
5 LIQUID ORAL EVERY 4 HOURS PRN
Status: DISCONTINUED | OUTPATIENT
Start: 2024-03-09 | End: 2024-03-12 | Stop reason: HOSPADM

## 2024-03-09 RX ORDER — LABETALOL 200 MG/1
200 TABLET, FILM COATED ORAL EVERY 12 HOURS SCHEDULED
Status: DISCONTINUED | OUTPATIENT
Start: 2024-03-09 | End: 2024-03-10

## 2024-03-09 RX ORDER — CEFTRIAXONE 1 G/50ML
1000 INJECTION, SOLUTION INTRAVENOUS EVERY 24 HOURS
Status: DISCONTINUED | OUTPATIENT
Start: 2024-03-09 | End: 2024-03-12 | Stop reason: HOSPADM

## 2024-03-09 RX ORDER — HYDROCHLOROTHIAZIDE 12.5 MG/1
12.5 TABLET ORAL DAILY
Status: DISCONTINUED | OUTPATIENT
Start: 2024-03-09 | End: 2024-03-09

## 2024-03-09 RX ORDER — SODIUM CHLORIDE FOR INHALATION 0.9 %
3 VIAL, NEBULIZER (ML) INHALATION
Status: DISCONTINUED | OUTPATIENT
Start: 2024-03-09 | End: 2024-03-09

## 2024-03-09 RX ORDER — BUDESONIDE AND FORMOTEROL FUMARATE DIHYDRATE 160; 4.5 UG/1; UG/1
2 AEROSOL RESPIRATORY (INHALATION) 2 TIMES DAILY
Status: DISCONTINUED | OUTPATIENT
Start: 2024-03-09 | End: 2024-03-12 | Stop reason: HOSPADM

## 2024-03-09 RX ORDER — HYDROXYZINE HYDROCHLORIDE 25 MG/1
25 TABLET, FILM COATED ORAL EVERY 6 HOURS PRN
Status: DISCONTINUED | OUTPATIENT
Start: 2024-03-09 | End: 2024-03-12 | Stop reason: HOSPADM

## 2024-03-09 RX ORDER — MAGNESIUM SULFATE HEPTAHYDRATE 40 MG/ML
2 INJECTION, SOLUTION INTRAVENOUS ONCE
Status: COMPLETED | OUTPATIENT
Start: 2024-03-09 | End: 2024-03-09

## 2024-03-09 RX ORDER — METHYLPREDNISOLONE SODIUM SUCCINATE 40 MG/ML
40 INJECTION, POWDER, LYOPHILIZED, FOR SOLUTION INTRAMUSCULAR; INTRAVENOUS EVERY 8 HOURS SCHEDULED
Status: DISCONTINUED | OUTPATIENT
Start: 2024-03-09 | End: 2024-03-11

## 2024-03-09 RX ORDER — HYDRALAZINE HYDROCHLORIDE 20 MG/ML
10 INJECTION INTRAMUSCULAR; INTRAVENOUS EVERY 4 HOURS PRN
Status: DISCONTINUED | OUTPATIENT
Start: 2024-03-09 | End: 2024-03-10

## 2024-03-09 RX ORDER — SODIUM CHLORIDE FOR INHALATION 0.9 %
12 VIAL, NEBULIZER (ML) INHALATION ONCE
Status: COMPLETED | OUTPATIENT
Start: 2024-03-09 | End: 2024-03-09

## 2024-03-09 RX ORDER — METHYLPREDNISOLONE SODIUM SUCCINATE 125 MG/2ML
125 INJECTION, POWDER, LYOPHILIZED, FOR SOLUTION INTRAMUSCULAR; INTRAVENOUS ONCE
Status: COMPLETED | OUTPATIENT
Start: 2024-03-09 | End: 2024-03-09

## 2024-03-09 RX ORDER — ENOXAPARIN SODIUM 100 MG/ML
40 INJECTION SUBCUTANEOUS DAILY
Status: DISCONTINUED | OUTPATIENT
Start: 2024-03-09 | End: 2024-03-12 | Stop reason: HOSPADM

## 2024-03-09 RX ADMIN — ACETAMINOPHEN 325MG 650 MG: 325 TABLET ORAL at 22:26

## 2024-03-09 RX ADMIN — METHYLPREDNISOLONE SODIUM SUCCINATE 125 MG: 125 INJECTION, POWDER, FOR SOLUTION INTRAMUSCULAR; INTRAVENOUS at 09:10

## 2024-03-09 RX ADMIN — HYDRALAZINE HYDROCHLORIDE 10 MG: 20 INJECTION, SOLUTION INTRAMUSCULAR; INTRAVENOUS at 21:04

## 2024-03-09 RX ADMIN — LEVALBUTEROL HYDROCHLORIDE 1.25 MG: 1.25 SOLUTION RESPIRATORY (INHALATION) at 20:15

## 2024-03-09 RX ADMIN — ALBUTEROL SULFATE 10 MG: 2.5 SOLUTION RESPIRATORY (INHALATION) at 09:28

## 2024-03-09 RX ADMIN — METHYLPREDNISOLONE SODIUM SUCCINATE 40 MG: 40 INJECTION, POWDER, FOR SOLUTION INTRAMUSCULAR; INTRAVENOUS at 23:50

## 2024-03-09 RX ADMIN — HYDRALAZINE HYDROCHLORIDE 10 MG: 20 INJECTION, SOLUTION INTRAMUSCULAR; INTRAVENOUS at 16:07

## 2024-03-09 RX ADMIN — Medication 12 ML: at 09:28

## 2024-03-09 RX ADMIN — BUDESONIDE AND FORMOTEROL FUMARATE DIHYDRATE 2 PUFF: 160; 4.5 AEROSOL RESPIRATORY (INHALATION) at 17:00

## 2024-03-09 RX ADMIN — MAGNESIUM SULFATE HEPTAHYDRATE 2 G: 40 INJECTION, SOLUTION INTRAVENOUS at 09:10

## 2024-03-09 RX ADMIN — IPRATROPIUM BROMIDE 1 MG: 0.5 SOLUTION RESPIRATORY (INHALATION) at 09:28

## 2024-03-09 RX ADMIN — METHYLPREDNISOLONE SODIUM SUCCINATE 40 MG: 40 INJECTION, POWDER, FOR SOLUTION INTRAMUSCULAR; INTRAVENOUS at 15:51

## 2024-03-09 RX ADMIN — IPRATROPIUM BROMIDE 0.5 MG: 0.5 SOLUTION RESPIRATORY (INHALATION) at 09:05

## 2024-03-09 RX ADMIN — CEFTRIAXONE 1000 MG: 1 INJECTION, SOLUTION INTRAVENOUS at 15:51

## 2024-03-09 RX ADMIN — HYDROCODONE BITARTRATE AND HOMATROPINE METHYLBROMIDE 5 ML: 5; 1.5 SYRUP ORAL at 18:26

## 2024-03-09 RX ADMIN — NICOTINE 7 MG/24 HR DAILY TRANSDERMAL PATCH 1 PATCH: at 15:49

## 2024-03-09 RX ADMIN — POTASSIUM CHLORIDE 40 MEQ: 1500 TABLET, EXTENDED RELEASE ORAL at 15:48

## 2024-03-09 RX ADMIN — ENOXAPARIN SODIUM 40 MG: 40 INJECTION SUBCUTANEOUS at 15:48

## 2024-03-09 RX ADMIN — LABETALOL HYDROCHLORIDE 200 MG: 200 TABLET, FILM COATED ORAL at 18:29

## 2024-03-09 RX ADMIN — ALBUTEROL SULFATE 5 MG: 2.5 SOLUTION RESPIRATORY (INHALATION) at 09:05

## 2024-03-09 NOTE — ED NOTES
Patient oxygen 89% on room air, placed on 2 L nasal cannula at this time.      Lidya Hall RN  03/09/24 5216    
Patient's oxygen noted to be at 84% during heart neb, switched from medical air to oxygen, provider made aware.      Grant Berkowitz RN  03/09/24 0949    
Patient's oxygen saturation now at 96%.      Grant Berkowitz RN  03/09/24 0998    
Pt eating lunch     Shreya Peralta RN  03/09/24 7273    
Pt given a menu at this time.      Olivia Pillai  03/09/24 4029    
Pt returned from XR. Oxygen at 86% on 2L. Pt increased to 3L with minimal improvement.     Olivia Pillai  03/09/24 1387    
Pt's oxygen @ 88%. Pt bumped to 4L at this time. Pt's oxygen now at 91%.      Olivia Pillai  03/09/24 3073    
Respiratory at bedside      Grant Berkowitz RN  03/09/24 3696    
Respiratory therapy contacted via tiger text for heart neb.      Grant Berkowitz RN  03/09/24 5832    
English

## 2024-03-09 NOTE — PLAN OF CARE
Problem: PAIN - ADULT  Goal: Verbalizes/displays adequate comfort level or baseline comfort level  Description: Interventions:  - Encourage patient to monitor pain and request assistance  - Assess pain using appropriate pain scale  - Administer analgesics based on type and severity of pain and evaluate response  - Implement non-pharmacological measures as appropriate and evaluate response  - Consider cultural and social influences on pain and pain management  - Notify physician/advanced practitioner if interventions unsuccessful or patient reports new pain  Outcome: Progressing     Problem: INFECTION - ADULT  Goal: Absence or prevention of progression during hospitalization  Description: INTERVENTIONS:  - Assess and monitor for signs and symptoms of infection  - Monitor lab/diagnostic results  - Monitor all insertion sites, i.e. indwelling lines, tubes, and drains  - Monitor endotracheal if appropriate and nasal secretions for changes in amount and color  - [place] appropriate cooling/warming therapies per order  - Administer medications as ordered  - Instruct and encourage patient and family to use good hand hygiene technique  - Identify and instruct in appropriate isolation precautions for identified infection/condition  Outcome: Progressing  Goal: Absence of fever/infection during neutropenic period  Description: INTERVENTIONS:  - Monitor WBC    Outcome: Progressing     Problem: SAFETY ADULT  Goal: Patient will remain free of falls  Description: INTERVENTIONS:  - Educate patient/family on patient safety including physical limitations  - Instruct patient to call for assistance with activity   - Consult OT/PT to assist with strengthening/mobility   - Keep Call bell within reach  - Keep bed low and locked with side rails adjusted as appropriate  - Keep care items and personal belongings within reach  - Initiate and maintain comfort rounds  - Make Fall Risk Sign visible to staff  - Offer Toileting   -  Initiate/Maintain alarm  - Obtain necessary fall risk management equipment  - Apply yellow socks and bracelet for high fall risk patients  - Consider moving patient to room near nurses station  Outcome: Progressing  Goal: Maintain or return to baseline ADL function  Description: INTERVENTIONS:  -  Assess patient's ability to carry out ADLs; assess patient's baseline for ADL function and identify physical deficits which impact ability to perform ADLs (bathing, care of mouth/teeth, toileting, grooming, dressing, etc.)  - Assess/evaluate cause of self-care deficits   - Assess range of motion  - Assess patient's mobility; develop plan if impaired  - Assess patient's need for assistive devices and provide as appropriate  - Encourage maximum independence but intervene and supervise when necessary  - Involve family in performance of ADLs  - Assess for home care needs following discharge   - Consider OT consult to assist with ADL evaluation and planning for discharge  - Provide patient education as appropriate  Outcome: Progressing  Goal: Maintains/Returns to pre admission functional level  Description: INTERVENTIONS:  - Perform AM-PAC 6 Click Basic Mobility/ Daily Activity assessment daily.  - Set and communicate daily mobility goal to care team and patient/family/caregiver.   - Collaborate with rehabilitation services on mobility goals if consulted  - Perform Range of Motion   - Reposition patient   - Dangle patient   - Stand patient   - Ambulate patient   - Out of bed to chair   - Out of bed for meals  - Out of bed for toileting  - Record patient progress and toleration of activity level   Outcome: Progressing     Problem: DISCHARGE PLANNING  Goal: Discharge to home or other facility with appropriate resources  Description: INTERVENTIONS:  - Identify barriers to discharge w/patient and caregiver  - Arrange for needed discharge resources and transportation as appropriate  - Identify discharge learning needs (meds, wound  care, etc.)  - Arrange for interpretive services to assist at discharge as needed  - Refer to Case Management Department for coordinating discharge planning if the patient needs post-hospital services based on physician/advanced practitioner order or complex needs related to functional status, cognitive ability, or social support system  Outcome: Progressing     Problem: CARDIOVASCULAR - ADULT  Goal: Maintains optimal cardiac output and hemodynamic stability  Description: INTERVENTIONS:  - Monitor I/O, vital signs and rhythm  - Monitor for S/S and trends of decreased cardiac output  - Administer and titrate ordered vasoactive medications to optimize hemodynamic stability  - Assess quality of pulses, skin color and temperature  - Assess for signs of decreased coronary artery perfusion  - Instruct patient to report change in severity of symptoms  Outcome: Progressing  Goal: Absence of cardiac dysrhythmias or at baseline rhythm  Description: INTERVENTIONS:  - Continuous cardiac monitoring, vital signs, obtain 12 lead EKG if ordered  - Administer antiarrhythmic and heart rate control medications as ordered  - Monitor electrolytes and administer replacement therapy as ordered  Outcome: Progressing     Problem: RESPIRATORY - ADULT  Goal: Achieves optimal ventilation and oxygenation  Description: INTERVENTIONS:  - Assess for changes in respiratory status  - Assess for changes in mentation and behavior  - Position to facilitate oxygenation and minimize respiratory effort  - Oxygen administered by appropriate delivery if ordered  - Initiate smoking cessation education as indicated  - Encourage broncho-pulmonary hygiene including cough, deep breathe, Incentive Spirometry  - Assess the need for suctioning and aspirate as needed  - Assess and instruct to report SOB or any respiratory difficulty  - Respiratory Therapy support as indicated  Outcome: Progressing

## 2024-03-09 NOTE — TREATMENT PLAN
Internal medicine note    Elevated blood pressure reading without diagnosis of hypertension  Assessment & Plan  Persistently elevated blood pressure despite given intravenous hydralazine.  Is going to start losartan/HCT however patient cannot rule out that she could be pregnant as she has not had regular menses in the last 2 months  Will start oral labetalol.  Check hCG        Frank Gomez DO

## 2024-03-09 NOTE — ED PROVIDER NOTES
History  Chief Complaint   Patient presents with    Asthma     Began last PM while at work. Pt reports taking meds as Rx'd without relief.     A 43-year-old female with past medical history of bipolar disorder and asthma; presents in acute respiratory distress.  Patient states she started with chest tightness and shortness of breath last night around 5 pm while at work.  Symptoms have continued to worsen since.  Patient has used her inhalers and nebulizer at home with minimal relief of her symptoms.  Patient otherwise denies fever, chills, chest pain, abdominal pain, nausea, vomiting, diarrhea, peripheral edema and rashes.  Patient states her symptoms do feel similar to prior asthma exacerbations.  She had previously required frequent admissions including intubation while living in the [facility] several years ago, none since moving to the area.  She has not been on steroids recently.      History provided by:  Patient and medical records  Asthma  Associated symptoms: shortness of breath and wheezing        Prior to Admission Medications   Prescriptions Last Dose Informant Patient Reported? Taking?   Multiple Vitamin (MULTIVITAMIN) tablet  Self Yes Yes   Sig: Take 1 tablet by mouth daily   Vraylar 1.5 MG capsule   Yes Yes   Sig: Take 1.5 mg by mouth daily   albuterol (PROVENTIL HFA,VENTOLIN HFA) 90 mcg/act inhaler  Self Yes Yes   Sig: Inhale 2 puffs every 6 (six) hours as needed for wheezing   albuterol (PROVENTIL HFA,VENTOLIN HFA) 90 mcg/act inhaler   No Yes   Sig: Inhale 2 puffs every 4 (four) hours as needed for wheezing (or cough)   budesonide-formoterol (Symbicort) 160-4.5 mcg/act inhaler   Yes Yes   Sig: Inhale 2 puffs 2 (two) times a day Rinse mouth after use.   cetirizine (ZyrTEC) 10 mg tablet   No No   Sig: Take 1 tablet (10 mg total) by mouth daily      Facility-Administered Medications: None       Past Medical History:   Diagnosis Date    Asthma     Bipolar disorder (HCC)        Past Surgical History:    Procedure Laterality Date    NO PAST SURGERIES         Family History   Problem Relation Age of Onset    Asthma Family      I have reviewed and agree with the history as documented.    E-Cigarette/Vaping    E-Cigarette Use Never User      E-Cigarette/Vaping Substances    Nicotine No     THC No     CBD No     Flavoring No     Other No     Unknown No      Social History     Tobacco Use    Smoking status: Some Days     Current packs/day: 1.50     Types: Cigarettes    Smokeless tobacco: Never   Vaping Use    Vaping status: Never Used   Substance Use Topics    Alcohol use: Yes     Comment: Weekends    Drug use: Yes     Types: Marijuana     Comment: occasional       Review of Systems   Respiratory:  Positive for chest tightness, shortness of breath and wheezing.    All other systems reviewed and are negative.      Physical Exam  Physical Exam  General Appearance: alert and oriented, acute respiratory distress  Skin:  Warm, dry, intact.  No cyanosis  HEENT: Atraumatic, normocephalic.  No eye drainage.  Normal hearing.  Moist mucous membranes.    Neck: Supple, trachea midline  Cardiac: RRR; no murmurs, rub, gallops.  No pedal edema, 2+ pulses  Pulmonary: Acute respiratory distress, speaking in 2-3 word sentences.  Significantly diminished air entry bilaterally with scant wheezing.  Course breath sounds without rhonchi or rales.  Gastrointestinal: abdomen soft, nontender, nondistended; no guarding or rebound tenderness; good bowel sounds, no mass or bruits  Extremities:  No deformities.  No calf tenderness, no clubbing  Neuro:  no focal motor or sensory deficits, CN 2-12 grossly intact  Psych:  Normal mood and affect, normal judgement and insight      Vital Signs  ED Triage Vitals   Temperature Pulse Respirations Blood Pressure SpO2   03/09/24 0856 03/09/24 0856 03/09/24 0856 03/09/24 0856 03/09/24 0856   98.9 °F (37.2 °C) (!) 115 (!) 32 (!) 157/106 (!) 89 %      Temp Source Heart Rate Source Patient Position - Orthostatic  VS BP Location FiO2 (%)   03/09/24 0856 03/09/24 0945 03/09/24 0856 03/09/24 0856 --   Oral Monitor Sitting Right arm       Pain Score       03/09/24 0856       No Pain           Vitals:    03/09/24 1245 03/09/24 1415 03/09/24 1442 03/09/24 1502   BP: 146/99 148/88 (!) 160/107 (!) 166/108   Pulse: 96 98 (!) 106 95   Patient Position - Orthostatic VS: Lying            Visual Acuity      ED Medications  Medications   HYDROcodone Bit-Homatrop MBr (HYCODAN) oral syrup 5 mL (has no administration in time range)   levalbuterol (XOPENEX) inhalation solution 1.25 mg (has no administration in time range)     And   sodium chloride 0.9 % inhalation solution 3 mL (has no administration in time range)   methylPREDNISolone sodium succinate (Solu-MEDROL) injection 40 mg (has no administration in time range)   nicotine (NICODERM CQ) 7 mg/24hr TD 24 hr patch 1 patch (has no administration in time range)   enoxaparin (LOVENOX) subcutaneous injection 40 mg (has no administration in time range)   cefTRIAXone (ROCEPHIN) IVPB (premix in dextrose) 1,000 mg 50 mL (has no administration in time range)   budesonide-formoterol (SYMBICORT) 160-4.5 mcg/act inhaler 2 puff (has no administration in time range)   cariprazine (VRAYLAR) capsule 1.5 mg (has no administration in time range)   acetaminophen (TYLENOL) tablet 650 mg (has no administration in time range)   ondansetron (ZOFRAN) injection 4 mg (has no administration in time range)   oxyCODONE (ROXICODONE) IR tablet 5 mg (has no administration in time range)   potassium chloride (Klor-Con M20) CR tablet 40 mEq (has no administration in time range)   hydrALAZINE (APRESOLINE) injection 10 mg (has no administration in time range)   albuterol inhalation solution 5 mg (5 mg Nebulization Given 3/9/24 0905)     And   ipratropium (ATROVENT) 0.02 % inhalation solution 0.5 mg (0.5 mg Nebulization Given 3/9/24 0905)   albuterol inhalation solution 10 mg (10 mg Nebulization Given 3/9/24 0928)    ipratropium (ATROVENT) 0.02 % inhalation solution 1 mg (1 mg Nebulization Given 3/9/24 0928)   sodium chloride 0.9 % inhalation solution 12 mL (12 mL Nebulization Given 3/9/24 0928)   methylPREDNISolone sodium succinate (Solu-MEDROL) injection 125 mg (125 mg Intravenous Given 3/9/24 0910)   magnesium sulfate 2 g/50 mL IVPB (premix) 2 g (0 g Intravenous Stopped 3/9/24 0951)       Diagnostic Studies  Results Reviewed       Procedure Component Value Units Date/Time    Basic metabolic panel [[id number]]  (Abnormal) Collected: 03/09/24 0910    Lab Status: Final result Specimen: Blood from Arm, Left Updated: 03/09/24 0936     Sodium 136 mmol/L      Potassium 3.3 mmol/L      Chloride 103 mmol/L      CO2 26 mmol/L      ANION GAP 7 mmol/L      BUN 9 mg/dL      Creatinine 0.58 mg/dL      Glucose 167 mg/dL      Calcium 8.7 mg/dL      eGFR 114 ml/min/1.73sq m     Narrative:      National Kidney Disease Foundation guidelines for Chronic Kidney Disease (CKD):     Stage 1 with normal or high GFR (GFR > 90 mL/min/1.73 square meters)    Stage 2 Mild CKD (GFR = 60-89 mL/min/1.73 square meters)    Stage 3A Moderate CKD (GFR = 45-59 mL/min/1.73 square meters)    Stage 3B Moderate CKD (GFR = 30-44 mL/min/1.73 square meters)    Stage 4 Severe CKD (GFR = 15-29 mL/min/1.73 square meters)    Stage 5 End Stage CKD (GFR <15 mL/min/1.73 square meters)  Note: GFR calculation is accurate only with a steady state creatinine    CBC and differential [[id number]]  (Abnormal) Collected: 03/09/24 0910    Lab Status: Final result Specimen: Blood from Arm, Left Updated: 03/09/24 0920     WBC 8.59 Thousand/uL      RBC 4.38 Million/uL      Hemoglobin 13.6 g/dL      Hematocrit 41.4 %      MCV 95 fL      MCH 31.1 pg      MCHC 32.9 g/dL      RDW 14.0 %      MPV 10.2 fL      Platelets 321 Thousands/uL      nRBC 0 /100 WBCs      Neutrophils Relative 77 %      Immat GRANS % 0 %      Lymphocytes Relative 14 %      Monocytes Relative 6 %      Eosinophils Relative  2 %      Basophils Relative 1 %      Neutrophils Absolute 6.65 Thousands/µL      Immature Grans Absolute 0.03 Thousand/uL      Lymphocytes Absolute 1.20 Thousands/µL      Monocytes Absolute 0.47 Thousand/µL      Eosinophils Absolute 0.20 Thousand/µL      Basophils Absolute 0.04 Thousands/µL                    XR chest 2 views   ED Interpretation by Roxann Morgan DO (03/09 1221)   No acute disease    Image independently interpreted by myself        Final Result by Moris Brown MD (03/09 1250)      No acute cardiopulmonary disease.            Workstation performed: [id number]                    Procedures  CriticalCare Time    Date/Time: 3/9/2024 9:10 AM    Performed by: Roxann Morgan DO  Authorized by: Roxann Morgan DO    Critical care provider statement:     Critical care time (minutes):  30    Critical care time was exclusive of:  Separately billable procedures and treating other patients and teaching time    Critical care was necessary to treat or prevent imminent or life-threatening deterioration of the following conditions:  Respiratory failure (Asthma exacerbation)    Critical care was time spent personally by me on the following activities:  Obtaining history from patient or surrogate, development of treatment plan with patient or surrogate, examination of patient, evaluation of patient's response to treatment, re-evaluation of patient's condition, ordering and review of radiographic studies, ordering and performing treatments and interventions, review of old charts and ordering and review of laboratory studies (Heart neb, magnesium and Solu-Medrol)    I assumed direction of critical care for this patient from another provider in my specialty: no        ECG 12 Lead Documentation  Date/Time: today/date: 3/9/2024  Performed by: Roxann Morgan    ECG reviewed by me, the ED Provider: yes    Patient location:  ED   Previous ECG:  Compared to current, no change   Rate: 108   ECG rate assessment: normal     Rhythm: sinus tachycardia    Ectopy:  none    QRS axis:  Normal  Intervals: normal   Q waves: None   ST segments:  Normal  T waves: normal      Impression: Sinus tachycardia, otherwise normal EKG      ED Course  ED Course as of 03/09/24 1546   Sat Mar 09, 2024   0923 Pt on duo-neb at this time, reports she is starting to feel better   0949 Pt on HUGHES neb, reports feeling better however remains hypoxic in the mid 80's.  Will transition from medical air to supplemental oxygen with breathing treatment.   1004 Hypoxia resolved when neb switched to supplemental oxygen.  Pt resting more comfortably, respiratory distress has resolved.  Pt now with diffuse expiratory wheezing, improved air entry throughout.   1103 Pt reassesed following HUGHES neb.  Improved air entry with persistent right sided wheezing.  Will obtain CXR to rule out PNA.  Will monitor off supplemental oxygen   1234 Pt with persistent hypoxia, now requiring up to 6L NC.  Will proceed with admission, pt in agreement.   1240 Spoke with SLIM, discussed pt's history, presentation and work up.  Will accept in admission.                                SBIRT 22yo+      Flowsheet Row Most Recent Value   Initial Alcohol Screen: US AUDIT-C     1. How often do you have a drink containing alcohol? 0 Filed at: 03/09/2024 0922   2. How many drinks containing alcohol do you have on a typical day you are drinking?  0 Filed at: 03/09/2024 0922   3b. FEMALE Any Age, or MALE 65+: How often do you have 4 or more drinks on one occassion? 0 Filed at: 03/09/2024 0922   Audit-C Score 0 Filed at: 03/09/2024 0922   HILDA: How many times in the past year have you...    Used an illegal drug or used a prescription medication for non-medical reasons? Never Filed at: 03/09/2024 0922                      Medical Decision Making  A 43-year-old female presents in acute respiratory distress, she has significantly diminished air entry bilaterally with scant wheezing.  She is hypoxic to the mid  80s, tachypneic and tachycardic.  Symptoms likely secondary to asthma exacerbation.  Will treat with heart neb, Solu-Medrol and magnesium.  Will check basic labs for anemia, significant leukocytosis, electrolyte abnormality and JOSELUIS.    Amount and/or Complexity of Data Reviewed  Labs: ordered.  Radiology: ordered and independent interpretation performed.    Risk  Prescription drug management.  Decision regarding hospitalization.             Disposition  Final diagnoses:   Acute asthma exacerbation   Hypoxia     Time reflects when diagnosis was documented in both MDM as applicable and the Disposition within this note       Time User Action Codes Description Comment    3/9/2024 12:40 PM Roxann Morgan Add [J45.901] Acute asthma exacerbation     3/9/2024 12:40 PM Roxann Morgan Add [R09.02] Hypoxia           ED Disposition       ED Disposition   Admit    Condition   Stable    Date/Time   Sat Mar 9, 2024 1240    Comment   Case was discussed with THOMAS and the patient's admission status was agreed to be Admission Status: inpatient status to the service of Dr. Gomez.               Follow-up Information       Follow up With Specialties Details Why Contact Info Additional Information    Carlos Gordon MD Internal Medicine   [address]  [facility] [id number]  [phone]       [facility] Pulmonary [facility] Pulmonology Schedule an appointment as soon as possible for a visit in 1 week(s)  [address]  [facility] [place] [facility] [address] [phone] [facility], [address] [place] [address] [address], [place], [place], [address] [phone]            Current Discharge Medication List        CONTINUE these medications which have NOT CHANGED    Details   !! albuterol (PROVENTIL HFA,VENTOLIN HFA) 90 mcg/act inhaler Inhale 2 puffs every 6 (six) hours as needed for wheezing    Comments: <!--EPICS-->Substitution to a formulary equivalent within the same pharmaceutical  class is authorized.<!--EPICE-->      !! albuterol (PROVENTIL HFA,VENTOLIN HFA) 90 mcg/act inhaler Inhale 2 puffs every 4 (four) hours as needed for wheezing (or cough)  Qty: 18 g, Refills: 0    Comments: Substitution to a formulary equivalent within the same pharmaceutical class is authorized.  Associated Diagnoses: Asthma exacerbation      budesonide-formoterol (Symbicort) 160-4.5 mcg/act inhaler Inhale 2 puffs 2 (two) times a day Rinse mouth after use.      Multiple Vitamin (MULTIVITAMIN) tablet Take 1 tablet by mouth daily      Vraylar 1.5 MG capsule Take 1.5 mg by mouth daily      cetirizine (ZyrTEC) 10 mg tablet Take 1 tablet (10 mg total) by mouth daily  Qty: 30 tablet, Refills: 0    Associated Diagnoses: Asthma exacerbation       !! - Potential duplicate medications found. Please discuss with provider.              PDMP Review       None            ED Provider  Electronically Signed by             Roxann Morgan DO  03/09/24 5580

## 2024-03-09 NOTE — ASSESSMENT & PLAN NOTE
Monitor with a.m. labs for steroid-induced hyperglycemia    Results from last 7 days   Lab Units 03/09/24  0910   GLUCOSE RANDOM mg/dL 167*

## 2024-03-09 NOTE — H&P
[facility]  H&P  Name: Kendra Trotter 43 y.o. female I MRN: [id number]  Unit/Bed#: ED-42 I Date of Admission: 3/9/2024   Date of Service: 3/9/2024 I Hospital Day: 0      Assessment/Plan   * Asthma exacerbation  Assessment & Plan  History of asthma and depression who presents to the hospital with worsening shortness of breath and asthma flare  Increased work of breathing with hypoxia requiring supplemental oxygen.  Prior to admission on albuterol and symbicort.  Continue IV methylprednisolone and nebulized bronchodilators.  Tracheobronchitis: No evidence of infiltrate on CXR.  Will empirically start ceftriaxone given symptoms and mucus production    Prediabetes  Assessment & Plan  Monitor with a.m. labs for steroid-induced hyperglycemia    Results from last 7 days   Lab Units 03/09/24  0910   GLUCOSE RANDOM mg/dL 167*       Hypokalemia  Assessment & Plan  Replace and recheck tomorrow    Mood disorder (HCC)  Assessment & Plan  Stable continue Vraylar    VTE Pharmacologic Prophylaxis: VTE Score: 3 Moderate Risk (Score 3-4) - Pharmacological DVT Prophylaxis Ordered: enoxaparin (Lovenox).  Code Status: Level 1 - Full Code  Discussion with family:     Anticipated Length of Stay: Patient will be admitted on an inpatient basis with an anticipated length of stay of greater than 2 midnights secondary to asthma exacerbation.    Total Time Spent on Date of Encounter in care of patient: This time was spent on one or more of the following: performing physical exam; counseling and coordination of care; obtaining or reviewing history; documenting in the medical record; reviewing/ordering tests, medications or procedures; communicating with other healthcare professionals and discussing with patient's family/caregivers.    Chief Complaint:     Asthma (Began last PM while at work. Pt reports taking meds as Rx'd without relief.)    History of Present Illness:    Kendra Trotter is a 43 y.o. female with a  past medical history of asthma and depression who presents with worsening shortness of breath.  Symptoms began last night around 5:00 at work.  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  She took her nebulizer treatments at home but unfortunately symptoms worsened so she came here to the hospital where she required multiple neb treatments, hypoxia requiring 4 L supplemental oxygen, and ongoing wheezing prompting admission.  She denies any fevers but has had chills.  No sick contacts.  She believes her flare may be from changes in environment which includes the cold freezer and the hot cooking area at the restaurant.  She has been using her Symbicort.  She has never been intubated.    Review of Systems:  Review of Systems   Constitutional:  Positive for chills. Negative for fever.   HENT:  Negative for facial swelling.    Eyes:  Negative for visual disturbance.   Respiratory:  Positive for cough and shortness of breath.    Cardiovascular:  Negative for chest pain.   Gastrointestinal:  Negative for abdominal pain, diarrhea, nausea and vomiting.   Genitourinary:  Negative for hematuria and urgency.   Musculoskeletal:  Negative for back pain and myalgias.   Skin:  Negative for rash.   Neurological:  Negative for numbness.   Psychiatric/Behavioral:  The patient is not nervous/anxious.    All other systems reviewed and are negative.        Past Medical and Surgical History:   Past Medical History:   Diagnosis Date    Asthma     Bipolar disorder (HCC)      Past Surgical History:   Procedure Laterality Date    NO PAST SURGERIES       Meds/Allergies:  Allergies: No Known Allergies  Prior to Admission Medications   Prescriptions Last Dose Informant Patient Reported? Taking?   Multiple Vitamin (MULTIVITAMIN) tablet  Self Yes Yes   Sig: Take 1 tablet by mouth daily   Vraylar 1.5 MG capsule   Yes Yes   Sig: Take 1.5 mg by mouth daily   albuterol (PROVENTIL HFA,VENTOLIN HFA) 90 mcg/act inhaler  Self Yes Yes   Sig: Inhale 2 puffs every 6  (six) hours as needed for wheezing   albuterol (PROVENTIL HFA,VENTOLIN HFA) 90 mcg/act inhaler   No Yes   Sig: Inhale 2 puffs every 4 (four) hours as needed for wheezing (or cough)   budesonide-formoterol (Symbicort) 160-4.5 mcg/act inhaler   Yes Yes   Sig: Inhale 2 puffs 2 (two) times a day Rinse mouth after use.   cetirizine (ZyrTEC) 10 mg tablet   No No   Sig: Take 1 tablet (10 mg total) by mouth daily      Facility-Administered Medications: None     Social History:     Social History     Socioeconomic History    Marital status: Single     Spouse name: Not on file    Number of children: Not on file    Years of education: Not on file    Highest education level: Not on file   Occupational History    Not on file   Tobacco Use    Smoking status: Some Days     Current packs/day: 1.50     Types: Cigarettes    Smokeless tobacco: Never   Vaping Use    Vaping status: Never Used   Substance and Sexual Activity    Alcohol use: Yes     Comment: Weekends    Drug use: Yes     Types: Marijuana     Comment: occasional    Sexual activity: Yes     Partners: Male   Other Topics Concern    Not on file   Social History Narrative    Not on file     Social Determinants of Health     Financial Resource Strain: Not on file   Food Insecurity: Not on file   Transportation Needs: Not on file   Physical Activity: Not on file   Stress: Not on file   Social Connections: Not on file   Intimate Partner Violence: Not on file   Housing Stability: Not on file     Patient Pre-hospital Living Situation:   Patient Pre-hospital Level of Mobility:   Patient Pre-hospital Diet Restrictions:     Family History:  Family History   Problem Relation Age of Onset    Asthma Family      Physical Exam:   Vitals:   Blood Pressure: 148/88 (03/09/24 1415)  Pulse: 98 (03/09/24 1415)  Temperature: 98.9 °F (37.2 °C) (03/09/24 0856)  Temp Source: Oral (03/09/24 0856)  Respirations: 18 (03/09/24 1415)  Weight - Scale: 80.9 kg (178 lb 5.6 oz) (03/09/24 0856)  SpO2: 93 %  (03/09/24 1415)    Physical Exam  Vitals reviewed.   Constitutional:       General: She is not in acute distress.     Appearance: Normal appearance. She is obese.   HENT:      Head: Normocephalic and atraumatic.   Eyes:      General: No scleral icterus.     Extraocular Movements: Extraocular movements intact.      Conjunctiva/sclera: Conjunctivae normal.   Cardiovascular:      Rate and Rhythm: Regular rhythm. Tachycardia present.      Heart sounds: Normal heart sounds.   Pulmonary:      Breath sounds: Decreased breath sounds and wheezing present.   Abdominal:      General: Bowel sounds are normal.      Palpations: Abdomen is soft.      Tenderness: There is no guarding or rebound.   Musculoskeletal:         General: No swelling or tenderness.      Cervical back: Normal range of motion.   Skin:     General: Skin is warm.   Neurological:      General: No focal deficit present.      Mental Status: She is alert.      Cranial Nerves: No cranial nerve deficit.   Psychiatric:         Mood and Affect: Mood normal.       Lab Results: I have personally reviewed pertinent reports.    Results from last 7 days   Lab Units 03/09/24  0910   WBC Thousand/uL 8.59   HEMOGLOBIN g/dL 13.6   HEMATOCRIT % 41.4   PLATELETS Thousands/uL 321   NEUTROS PCT % 77*   LYMPHS PCT % 14   MONOS PCT % 6   EOS PCT % 2     Results from last 7 days   Lab Units 03/09/24  0910   SODIUM mmol/L 136   POTASSIUM mmol/L 3.3*   CHLORIDE mmol/L 103   CO2 mmol/L 26   ANION GAP mmol/L 7   BUN mg/dL 9   CREATININE mg/dL 0.58*   CALCIUM mg/dL 8.7   EGFR ml/min/1.73sq m 114   GLUCOSE RANDOM mg/dL 167*             Lines/Drains  Invasive Devices       Peripheral Intravenous Line  Duration             Peripheral IV 03/09/24 Left;Ventral (anterior) Forearm <1 day                    Imaging: I have personally reviewed pertinent films in PACS  XR chest 2 views    Result Date: 3/9/2024  Impression: No acute cardiopulmonary disease. Workstation performed: [id number]        EKG, Pathology, and Other Studies Reviewed on Admission:   EKG  Result Date: 03/09/24  Personally reviewed strips with impression of: Sinus tachycardia 108 bpm    ** Please Note: This note has been constructed using a voice recognition system. **

## 2024-03-09 NOTE — ASSESSMENT & PLAN NOTE
Persistently elevated blood pressure despite given intravenous hydralazine.  Will start hyzaar 50-12.5 mg  Hydralazine prn

## 2024-03-09 NOTE — ASSESSMENT & PLAN NOTE
History of asthma and depression who presents to the hospital with worsening shortness of breath and asthma flare  Increased work of breathing with hypoxia requiring supplemental oxygen.  Prior to admission on albuterol and symbicort.  Continue IV methylprednisolone and nebulized bronchodilators.  Tracheobronchitis: No evidence of infiltrate on CXR.  Will empirically start ceftriaxone given symptoms and mucus production

## 2024-03-10 PROBLEM — J96.01 ACUTE HYPOXIC RESPIRATORY FAILURE (HCC): Status: ACTIVE | Noted: 2024-03-10

## 2024-03-10 LAB
ALBUMIN SERPL BCG-MCNC: 4.4 G/DL (ref 3.5–5)
ALP SERPL-CCNC: 83 U/L (ref 34–104)
ALT SERPL W P-5'-P-CCNC: 11 U/L (ref 7–52)
ANION GAP SERPL CALCULATED.3IONS-SCNC: 7 MMOL/L
AST SERPL W P-5'-P-CCNC: 12 U/L (ref 13–39)
BILIRUB SERPL-MCNC: 0.29 MG/DL (ref 0.2–1)
BUN SERPL-MCNC: 11 MG/DL (ref 5–25)
CALCIUM SERPL-MCNC: 9.4 MG/DL (ref 8.4–10.2)
CHLORIDE SERPL-SCNC: 103 MMOL/L (ref 96–108)
CO2 SERPL-SCNC: 26 MMOL/L (ref 21–32)
CREAT SERPL-MCNC: 0.58 MG/DL (ref 0.6–1.3)
ERYTHROCYTE [DISTWIDTH] IN BLOOD BY AUTOMATED COUNT: 14 % (ref 11.6–15.1)
GFR SERPL CREATININE-BSD FRML MDRD: 114 ML/MIN/1.73SQ M
GLUCOSE SERPL-MCNC: 135 MG/DL (ref 65–140)
HCT VFR BLD AUTO: 44 % (ref 34.8–46.1)
HGB BLD-MCNC: 14.7 G/DL (ref 11.5–15.4)
MCH RBC QN AUTO: 30.9 PG (ref 26.8–34.3)
MCHC RBC AUTO-ENTMCNC: 33.4 G/DL (ref 31.4–37.4)
MCV RBC AUTO: 92 FL (ref 82–98)
PLATELET # BLD AUTO: 349 THOUSANDS/UL (ref 149–390)
PMV BLD AUTO: 10.3 FL (ref 8.9–12.7)
POTASSIUM SERPL-SCNC: 3.9 MMOL/L (ref 3.5–5.3)
PROT SERPL-MCNC: 8.3 G/DL (ref 6.4–8.4)
RBC # BLD AUTO: 4.76 MILLION/UL (ref 3.81–5.12)
SODIUM SERPL-SCNC: 136 MMOL/L (ref 135–147)
WBC # BLD AUTO: 14.02 THOUSAND/UL (ref 4.31–10.16)

## 2024-03-10 PROCEDURE — 80053 COMPREHEN METABOLIC PANEL: CPT | Performed by: INTERNAL MEDICINE

## 2024-03-10 PROCEDURE — 94760 N-INVAS EAR/PLS OXIMETRY 1: CPT

## 2024-03-10 PROCEDURE — 99232 SBSQ HOSP IP/OBS MODERATE 35: CPT | Performed by: PHYSICIAN ASSISTANT

## 2024-03-10 PROCEDURE — 94640 AIRWAY INHALATION TREATMENT: CPT

## 2024-03-10 PROCEDURE — 85027 COMPLETE CBC AUTOMATED: CPT | Performed by: INTERNAL MEDICINE

## 2024-03-10 RX ORDER — LOSARTAN POTASSIUM 25 MG/1
25 TABLET ORAL DAILY
Status: DISCONTINUED | OUTPATIENT
Start: 2024-03-10 | End: 2024-03-12 | Stop reason: HOSPADM

## 2024-03-10 RX ORDER — HYDRALAZINE HYDROCHLORIDE 20 MG/ML
5 INJECTION INTRAMUSCULAR; INTRAVENOUS EVERY 4 HOURS PRN
Status: DISCONTINUED | OUTPATIENT
Start: 2024-03-10 | End: 2024-03-10

## 2024-03-10 RX ORDER — HYDRALAZINE HYDROCHLORIDE 20 MG/ML
5 INJECTION INTRAMUSCULAR; INTRAVENOUS ONCE
Status: COMPLETED | OUTPATIENT
Start: 2024-03-10 | End: 2024-03-10

## 2024-03-10 RX ORDER — HYDROCHLOROTHIAZIDE 12.5 MG/1
12.5 TABLET ORAL DAILY
Status: DISCONTINUED | OUTPATIENT
Start: 2024-03-10 | End: 2024-03-12 | Stop reason: HOSPADM

## 2024-03-10 RX ORDER — LEVALBUTEROL INHALATION SOLUTION 1.25 MG/3ML
1.25 SOLUTION RESPIRATORY (INHALATION)
Status: DISCONTINUED | OUTPATIENT
Start: 2024-03-10 | End: 2024-03-12 | Stop reason: HOSPADM

## 2024-03-10 RX ORDER — ALBUTEROL SULFATE 90 UG/1
2 AEROSOL, METERED RESPIRATORY (INHALATION) EVERY 6 HOURS PRN
Status: DISCONTINUED | OUTPATIENT
Start: 2024-03-10 | End: 2024-03-12 | Stop reason: HOSPADM

## 2024-03-10 RX ORDER — HYDRALAZINE HYDROCHLORIDE 20 MG/ML
10 INJECTION INTRAMUSCULAR; INTRAVENOUS EVERY 4 HOURS PRN
Status: DISCONTINUED | OUTPATIENT
Start: 2024-03-10 | End: 2024-03-12 | Stop reason: HOSPADM

## 2024-03-10 RX ADMIN — CARIPRAZINE 1.5 MG: 1.5 CAPSULE, GELATIN COATED ORAL at 10:06

## 2024-03-10 RX ADMIN — ACETAMINOPHEN 325MG 650 MG: 325 TABLET ORAL at 07:45

## 2024-03-10 RX ADMIN — LEVALBUTEROL HYDROCHLORIDE 1.25 MG: 1.25 SOLUTION RESPIRATORY (INHALATION) at 13:08

## 2024-03-10 RX ADMIN — HYDRALAZINE HYDROCHLORIDE 5 MG: 20 INJECTION, SOLUTION INTRAMUSCULAR; INTRAVENOUS at 03:01

## 2024-03-10 RX ADMIN — LEVALBUTEROL HYDROCHLORIDE 1.25 MG: 1.25 SOLUTION RESPIRATORY (INHALATION) at 07:06

## 2024-03-10 RX ADMIN — IPRATROPIUM BROMIDE 0.5 MG: 0.5 SOLUTION RESPIRATORY (INHALATION) at 13:08

## 2024-03-10 RX ADMIN — ENOXAPARIN SODIUM 40 MG: 40 INJECTION SUBCUTANEOUS at 08:40

## 2024-03-10 RX ADMIN — CEFTRIAXONE 1000 MG: 1 INJECTION, SOLUTION INTRAVENOUS at 15:58

## 2024-03-10 RX ADMIN — HYDROCODONE BITARTRATE AND HOMATROPINE METHYLBROMIDE 5 ML: 5; 1.5 SYRUP ORAL at 15:13

## 2024-03-10 RX ADMIN — BUDESONIDE AND FORMOTEROL FUMARATE DIHYDRATE 2 PUFF: 160; 4.5 AEROSOL RESPIRATORY (INHALATION) at 18:39

## 2024-03-10 RX ADMIN — BUDESONIDE AND FORMOTEROL FUMARATE DIHYDRATE 2 PUFF: 160; 4.5 AEROSOL RESPIRATORY (INHALATION) at 08:40

## 2024-03-10 RX ADMIN — TERBUTALINE SULFATE 0.25 MG: 1 INJECTION, SOLUTION SUBCUTANEOUS at 00:13

## 2024-03-10 RX ADMIN — IPRATROPIUM BROMIDE 0.5 MG: 0.5 SOLUTION RESPIRATORY (INHALATION) at 07:06

## 2024-03-10 RX ADMIN — HYDRALAZINE HYDROCHLORIDE 10 MG: 20 INJECTION, SOLUTION INTRAMUSCULAR; INTRAVENOUS at 18:42

## 2024-03-10 RX ADMIN — IPRATROPIUM BROMIDE 0.5 MG: 0.5 SOLUTION RESPIRATORY (INHALATION) at 01:14

## 2024-03-10 RX ADMIN — NICOTINE 7 MG/24 HR DAILY TRANSDERMAL PATCH 1 PATCH: at 08:52

## 2024-03-10 RX ADMIN — Medication 0.5 MG: at 01:14

## 2024-03-10 RX ADMIN — LEVALBUTEROL HYDROCHLORIDE 1.25 MG: 1.25 SOLUTION RESPIRATORY (INHALATION) at 01:00

## 2024-03-10 RX ADMIN — HYDRALAZINE HYDROCHLORIDE 5 MG: 20 INJECTION, SOLUTION INTRAMUSCULAR; INTRAVENOUS at 06:33

## 2024-03-10 RX ADMIN — METHYLPREDNISOLONE SODIUM SUCCINATE 40 MG: 40 INJECTION, POWDER, FOR SOLUTION INTRAMUSCULAR; INTRAVENOUS at 08:39

## 2024-03-10 RX ADMIN — HYDRALAZINE HYDROCHLORIDE 5 MG: 20 INJECTION, SOLUTION INTRAMUSCULAR; INTRAVENOUS at 09:58

## 2024-03-10 RX ADMIN — HYDROCHLOROTHIAZIDE 12.5 MG: 12.5 TABLET ORAL at 12:55

## 2024-03-10 RX ADMIN — LOSARTAN POTASSIUM 25 MG: 25 TABLET, FILM COATED ORAL at 12:55

## 2024-03-10 RX ADMIN — LEVALBUTEROL HYDROCHLORIDE 1.25 MG: 1.25 SOLUTION RESPIRATORY (INHALATION) at 19:22

## 2024-03-10 RX ADMIN — IPRATROPIUM BROMIDE 0.5 MG: 0.5 SOLUTION RESPIRATORY (INHALATION) at 19:22

## 2024-03-10 RX ADMIN — LABETALOL HYDROCHLORIDE 200 MG: 200 TABLET, FILM COATED ORAL at 08:39

## 2024-03-10 RX ADMIN — METHYLPREDNISOLONE SODIUM SUCCINATE 40 MG: 40 INJECTION, POWDER, FOR SOLUTION INTRAMUSCULAR; INTRAVENOUS at 23:10

## 2024-03-10 RX ADMIN — METHYLPREDNISOLONE SODIUM SUCCINATE 40 MG: 40 INJECTION, POWDER, FOR SOLUTION INTRAMUSCULAR; INTRAVENOUS at 15:57

## 2024-03-10 NOTE — QUICK NOTE
Noted w/worsening respiratory distress RR in mid 20s-sometimes upwards of 30s earlier in evening w/significantly diminished respiratory excursion and mid to end expiratory faint polyphonic wheezing and tripoding.  Terbutaline ordered x1.  D/w RT will increase xopenex to q6h start atrovent nebulizer q6h.  Feeling improved post xopenex.  If worsens will trial bipap but respiratory distress presently improving w/xopenex therapy/

## 2024-03-10 NOTE — ASSESSMENT & PLAN NOTE
Monitor with a.m. labs for steroid-induced hyperglycemia    Results from last 7 days   Lab Units 03/10/24  0606 03/09/24  0910   GLUCOSE RANDOM mg/dL 135 167*

## 2024-03-10 NOTE — ASSESSMENT & PLAN NOTE
History of asthma and depression who presents to the hospital with worsening shortness of breath and asthma flare  Increased work of breathing with hypoxia requiring supplemental oxygen.  Prior to admission on albuterol and symbicort.  Continue IV methylprednisolone   Continue Xopenex, Atrovent  Symbicort  CXR WNL, procalcitonin normal

## 2024-03-10 NOTE — PLAN OF CARE
Problem: PAIN - ADULT  Goal: Verbalizes/displays adequate comfort level or baseline comfort level  Description: Interventions:  - Encourage patient to monitor pain and request assistance  - Assess pain using appropriate pain scale  - Administer analgesics based on type and severity of pain and evaluate response  - Implement non-pharmacological measures as appropriate and evaluate response  - Consider cultural and social influences on pain and pain management  - Notify physician/advanced practitioner if interventions unsuccessful or patient reports new pain  Outcome: Progressing     Problem: INFECTION - ADULT  Goal: Absence or prevention of progression during hospitalization  Description: INTERVENTIONS:  - Assess and monitor for signs and symptoms of infection  - Monitor lab/diagnostic results  - Monitor all insertion sites, i.e. indwelling lines, tubes, and drains  - Monitor endotracheal if appropriate and nasal secretions for changes in amount and color  - Jonancy appropriate cooling/warming therapies per order  - Administer medications as ordered  - Instruct and encourage patient and family to use good hand hygiene technique  - Identify and instruct in appropriate isolation precautions for identified infection/condition  Outcome: Progressing  Goal: Absence of fever/infection during neutropenic period  Description: INTERVENTIONS:  - Monitor WBC    Outcome: Progressing     Problem: SAFETY ADULT  Goal: Patient will remain free of falls  Description: INTERVENTIONS:  - Educate patient/family on patient safety including physical limitations  - Instruct patient to call for assistance with activity   - Consult OT/PT to assist with strengthening/mobility   - Keep Call bell within reach  - Keep bed low and locked with side rails adjusted as appropriate  - Keep care items and personal belongings within reach  - Initiate and maintain comfort rounds  - Make Fall Risk Sign visible to staff  - Offer Toileting every 2 Hours,  in advance of need  - Initiate/Maintain bed alarm  - Obtain necessary fall risk management equipment.  - Apply yellow socks and bracelet for high fall risk patients  - Consider moving patient to room near nurses station  Outcome: Progressing  Goal: Maintain or return to baseline ADL function  Description: INTERVENTIONS:  -  Assess patient's ability to carry out ADLs; assess patient's baseline for ADL function and identify physical deficits which impact ability to perform ADLs (bathing, care of mouth/teeth, toileting, grooming, dressing, etc.)  - Assess/evaluate cause of self-care deficits   - Assess range of motion  - Assess patient's mobility; develop plan if impaired  - Assess patient's need for assistive devices and provide as appropriate  - Encourage maximum independence but intervene and supervise when necessary  - Involve family in performance of ADLs  - Assess for home care needs following discharge   - Consider OT consult to assist with ADL evaluation and planning for discharge  - Provide patient education as appropriate  Outcome: Progressing  Goal: Maintains/Returns to pre admission functional level  Description: INTERVENTIONS:  - Perform AM-PAC 6 Click Basic Mobility/ Daily Activity assessment daily.  - Set and communicate daily mobility goal to care team and patient/family/caregiver.   - Collaborate with rehabilitation services on mobility goals if consulted  - Perform Range of Motion 3 times a day.  - Reposition patient every 2 hours.  - Dangle patient 3 times a day  - Stand patient 3 times a day  - Ambulate patient 3 times a day  - Out of bed to chair 3 times a day   - Out of bed for meals 3 times a day  - Out of bed for toileting  - Record patient progress and toleration of activity level   Outcome: Progressing     Problem: DISCHARGE PLANNING  Goal: Discharge to home or other facility with appropriate resources  Description: INTERVENTIONS:  - Identify barriers to discharge w/patient and caregiver  -  Arrange for needed discharge resources and transportation as appropriate  - Identify discharge learning needs (meds, wound care, etc.)  - Arrange for interpretive services to assist at discharge as needed  - Refer to Case Management Department for coordinating discharge planning if the patient needs post-hospital services based on physician/advanced practitioner order or complex needs related to functional status, cognitive ability, or social support system  Outcome: Progressing     Problem: CARDIOVASCULAR - ADULT  Goal: Maintains optimal cardiac output and hemodynamic stability  Description: INTERVENTIONS:  - Monitor I/O, vital signs and rhythm  - Monitor for S/S and trends of decreased cardiac output  - Administer and titrate ordered vasoactive medications to optimize hemodynamic stability  - Assess quality of pulses, skin color and temperature  - Assess for signs of decreased coronary artery perfusion  - Instruct patient to report change in severity of symptoms  Outcome: Progressing  Goal: Absence of cardiac dysrhythmias or at baseline rhythm  Description: INTERVENTIONS:  - Continuous cardiac monitoring, vital signs, obtain 12 lead EKG if ordered  - Administer antiarrhythmic and heart rate control medications as ordered  - Monitor electrolytes and administer replacement therapy as ordered  Outcome: Progressing     Problem: RESPIRATORY - ADULT  Goal: Achieves optimal ventilation and oxygenation  Description: INTERVENTIONS:  - Assess for changes in respiratory status  - Assess for changes in mentation and behavior  - Position to facilitate oxygenation and minimize respiratory effort  - Oxygen administered by appropriate delivery if ordered  - Initiate smoking cessation education as indicated  - Encourage broncho-pulmonary hygiene including cough, deep breathe, Incentive Spirometry  - Assess the need for suctioning and aspirate as needed  - Assess and instruct to report SOB or any respiratory difficulty  -  Respiratory Therapy support as indicated  Outcome: Progressing

## 2024-03-10 NOTE — CASE MANAGEMENT
Case Management Assessment & Discharge Planning Note    Patient name Kendra Trotter  Location [facility] M* MRN [id number]  : 1980 Date 3/10/2024       Current Admission Date: 3/9/2024  Current Admission Diagnosis:Asthma exacerbation   Patient Active Problem List    Diagnosis Date Noted    Acute hypoxic respiratory failure (HCC) 03/10/2024    Asthma exacerbation 2024    Elevated blood pressure reading without diagnosis of hypertension 2024    Prediabetes 2020    Mood disorder (HCC) 2020    Cannabis abuse, in remission 2020    Hypokalemia 2020    Mild intermittent asthma without complication 2019    Chronic cough 2019      LOS (days): 1  Geometric Mean LOS (GMLOS) (days):   Days to GMLOS:     OBJECTIVE:    Risk of Unplanned Readmission Score: 13.04        Current admission status: Inpatient      Preferred Pharmacy:   [id number] - [place], [place] - [address]  [address]  [facility] [address]  Phone: [phone] Fax: [phone]    Primary Care Provider: Stephanie Willson MD    Primary Insurance: [employer] [facility]  Secondary Insurance:     ASSESSMENT:  Active Health Care Proxies    There are no active Health Care Proxies on file.            Readmission Root Cause  30 Day Readmission: No    Patient Information  Admitted from:: Home  Mental Status: Alert  During Assessment patient was accompanied by: Not accompanied during assessment  Assessment information provided by:: Patient  Primary Caregiver: Self  Support Systems: Spouse/significant other, Children, Self, Family members  County of Residence: [place]  What city do you live in?: [place]  Home entry access options. Select all that apply.: Stairs  Type of Current Residence: 2 story home  Upon entering residence, is there a bedroom on the main floor (no further steps)?: No  A bedroom is located on the following floor levels of residence (select all  that apply):: 2nd Floor  Upon entering residence, is there a bathroom on the main floor (no further steps)?: Yes  Number of steps to 2nd floor from main floor: One Flight  Living Arrangements: Lives w/ Spouse/significant other, Lives w/ Son, Lives w/ Daughter  Is patient a ?: No    Activities of Daily Living Prior to Admission  Functional Status: Independent  Completes ADLs independently?: Yes  Ambulates independently?: Yes  Does patient use assisted devices?: No  Does patient currently own DME?: Yes  What DME does the patient currently own?: Nebulizer  Does patient have a history of Outpatient Therapy (PT/OT)?: No  Does the patient have a history of Short-Term Rehab?: No  Does patient have a history of HHC?: No  Does patient currently have HHC?: No      Patient Information Continued  Income Source: Unknown  Does patient have prescription coverage?: Yes  Does patient have a history of substance abuse?: No  Does patient have a history of Mental Health Diagnosis?: Yes (Mood disorder)  Is patient receiving treatment for mental health?: Yes  Has patient received inpatient treatment related to mental health in the last 2 years?: No      Means of Transportation  Means of Transport to Appts:: Drives Self      Social Determinants of Health (SDOH)      Flowsheet Row Most Recent Value   Housing Stability    In the last 12 months, was there a time when you were not able to pay the mortgage or rent on time? N   In the last 12 months, how many places have you lived? 1   In the last 12 months, was there a time when you did not have a steady place to sleep or slept in a shelter (including now)? N   Transportation Needs    In the past 12 months, has lack of transportation kept you from medical appointments or from getting medications? no   In the past 12 months, has lack of transportation kept you from meetings, work, or from getting things needed for daily living? No   Food Insecurity    Within the past 12 months, you  worried that your food would run out before you got the money to buy more. Never true   Within the past 12 months, the food you bought just didn't last and you didn't have money to get more. Never true   Utilities    In the past 12 months has the electric, gas, oil, or water company threatened to shut off services in your home? No          DISCHARGE DETAILS:    Discharge planning discussed with:: Patient  Freedom of Choice: Yes     CM contacted family/caregiver?: No- see comments ([id number])    Requested Home Health Care         Is the patient interested in HHC at discharge?: No    DME Referral Provided  Referral made for DME?: No    Other Referral/Resources/Interventions Provided:  Interventions: None Indicated       Additional Comments: Met with patient at bedside.  Reports she has a nebulizer at home needs more nebulizer solution.  CM department following thru discharge

## 2024-03-10 NOTE — PROGRESS NOTES
[facility]  Progress Note  Name: Kendra Trotter I  MRN: [id number]  Unit/Bed#: [place] [address] -02 I Date of Admission: 3/9/2024   Date of Service: 3/10/2024 I Hospital Day: 1    Assessment/Plan   * Asthma exacerbation  Assessment & Plan  History of asthma and depression who presents to the hospital with worsening shortness of breath and asthma flare  Increased work of breathing with hypoxia requiring supplemental oxygen.  Prior to admission on albuterol and symbicort.  Continue IV methylprednisolone   Continue Xopenex, Atrovent  Symbicort  CXR WNL, procalcitonin normal    Acute hypoxic respiratory failure (HCC)  Assessment & Plan  Requiring up to 5 L NC O2  Due to asthma    Elevated blood pressure reading without diagnosis of hypertension  Assessment & Plan  Persistently elevated blood pressure despite given intravenous hydralazine.  Will start hyzaar 50-12.5 mg  Hydralazine prn    Prediabetes  Assessment & Plan  Monitor with a.m. labs for steroid-induced hyperglycemia    Results from last 7 days   Lab Units 03/10/24  0606 03/09/24  0910   GLUCOSE RANDOM mg/dL 135 167*         Hypokalemia  Assessment & Plan  Replace and recheck tomorrow    Mood disorder (HCC)  Assessment & Plan  Stable continue Vraylar           VTE Pharmacologic Prophylaxis: VTE Score: 3 Moderate Risk (Score 3-4) - Pharmacological DVT Prophylaxis Ordered: enoxaparin (Lovenox).    Mobility:   Basic Mobility Inpatient Raw Score: 24  JH-HLM Goal: 8: Walk 250 feet or more  JH-HLM Achieved: 3: Sit at edge of bed  HLM Goal NOT achieved. Continue with multidisciplinary rounding and encourage appropriate mobility to improve upon HLM goals.    Patient Centered Rounds: With nurse   Discussions with Specialists or Other Care Team Provider: none    Education and Discussions with Family / Patient: Patient declined call to .     Total Time Spent on Date of Encounter in care of patient: 25 mins. This time was spent on  one or more of the following: performing physical exam; counseling and coordination of care; obtaining or reviewing history; documenting in the medical record; reviewing/ordering tests, medications or procedures; communicating with other healthcare professionals and discussing with patient's family/caregivers.    Current Length of Stay: 1 day(s)  Current Patient Status: Inpatient   Certification Statement: The patient will continue to require additional inpatient hospital stay due to hypoxia  Discharge Plan: Anticipate discharge in 24-48 hrs to home.    Code Status: Level 1 - Full Code    Subjective:   The patient is seen in bed. She is feeling better than yesterday, her breathing has improved. She denies lightheadedness or dizziness, denies chest pain or SOB.    Objective:     Vitals:   Temp (24hrs), Av.7 °F (37.1 °C), Min:98.3 °F (36.8 °C), Max:99.7 °F (37.6 °C)    Temp:  [98.3 °F (36.8 °C)-99.7 °F (37.6 °C)] 98.5 °F (36.9 °C)  HR:  [] 98  Resp:  [18-24] 24  BP: (146-174)/() 166/110  SpO2:  [88 %-98 %] 94 %  Body mass index is 30.55 kg/m².     Input and Output Summary (last 24 hours):   No intake or output data in the 24 hours ending 03/10/24 1237    Physical Exam:   Physical Exam  Vitals and nursing note reviewed.   Constitutional:       General: She is not in acute distress.     Appearance: Normal appearance. She is not ill-appearing, toxic-appearing or diaphoretic.   HENT:      Head: Normocephalic and atraumatic.   Cardiovascular:      Rate and Rhythm: Normal rate and regular rhythm.      Heart sounds: No murmur heard.     No friction rub. No gallop.   Pulmonary:      Effort: Pulmonary effort is normal. No respiratory distress.      Breath sounds: Wheezing present. No rhonchi or rales.   Abdominal:      General: Abdomen is flat. Bowel sounds are normal. There is no distension.      Palpations: Abdomen is soft.      Tenderness: There is no abdominal tenderness.   Musculoskeletal:      Right lower  leg: No edema.      Left lower leg: No edema.   Skin:     General: Skin is warm and dry.      Coloration: Skin is not jaundiced or pale.   Neurological:      General: No focal deficit present.      Mental Status: She is alert. Mental status is at baseline.          Additional Data:     Labs:  Results from last 7 days   Lab Units 03/10/24  0606 03/09/24  0910   WBC Thousand/uL 14.02* 8.59   HEMOGLOBIN g/dL 14.7 13.6   HEMATOCRIT % 44.0 41.4   PLATELETS Thousands/uL 349 321   NEUTROS PCT %  --  77*   LYMPHS PCT %  --  14   MONOS PCT %  --  6   EOS PCT %  --  2     Results from last 7 days   Lab Units 03/10/24  0606   SODIUM mmol/L 136   POTASSIUM mmol/L 3.9   CHLORIDE mmol/L 103   CO2 mmol/L 26   BUN mg/dL 11   CREATININE mg/dL 0.58*   ANION GAP mmol/L 7   CALCIUM mg/dL 9.4   ALBUMIN g/dL 4.4   TOTAL BILIRUBIN mg/dL 0.29   ALK PHOS U/L 83   ALT U/L 11   AST U/L 12*   GLUCOSE RANDOM mg/dL 135                       Lines/Drains:  Invasive Devices       Peripheral Intravenous Line  Duration             Peripheral IV 03/09/24 Left;Ventral (anterior) Forearm 1 day                          Imaging: Reviewed radiology reports from this admission including: chest xray    Recent Cultures (last 7 days):         Last 24 Hours Medication List:   Current Facility-Administered Medications   Medication Dose Route Frequency Provider Last Rate    acetaminophen  650 mg Oral Q4H PRN Frank Jason, DO      albuterol  2 puff Inhalation Q6H PRN Frank Jason, DO      budesonide-formoterol  2 puff Inhalation BID Frank Jason, DO      cariprazine  1.5 mg Oral Daily Frank Jason, DO      cefTRIAXone  1,000 mg Intravenous Q24H Frank Jason, DO 1,000 mg (03/09/24 1551)    enoxaparin  40 mg Subcutaneous Daily Frank Gomez, DO      hydrALAZINE  10 mg Intravenous Q4H PRN Delia Christensen PA-C      losartan  25 mg Oral Daily Tre Castorena PA-C      And    hydroCHLOROthiazide  12.5 mg Oral Daily Tre Castorena PA-C      HYDROcodone Bit-Homatrop  MBr  5 mL Oral Q4H PRN Frank Gomez, DO      hydrOXYzine HCL  25 mg Oral Q6H PRN Frank Gomez, DO      influenza vaccine  0.5 mL Intramuscular Prior to discharge Frank Gomez, DO      ipratropium  0.5 mg Nebulization Q6H Frank Gomez, DO      levalbuterol  1.25 mg Nebulization Q6H Delia Christensen PA-C      methylPREDNISolone sodium succinate  40 mg Intravenous Q8H LUPE Frank Gomez, DO      nicotine  1 patch Transdermal Daily Frank Gomez, DO      ondansetron  4 mg Intravenous Q4H PRN Frank Gomez, DO      oxyCODONE  5 mg Oral Q4H PRN Frank Gomez, DO          Today, Patient Was Seen By: Tre Castorena PA-C    **Please Note: This note may have been constructed using a voice recognition system.**

## 2024-03-10 NOTE — PLAN OF CARE
Problem: PAIN - ADULT  Goal: Verbalizes/displays adequate comfort level or baseline comfort level  Description: Interventions:  - Encourage patient to monitor pain and request assistance  - Assess pain using appropriate pain scale  - Administer analgesics based on type and severity of pain and evaluate response  - Implement non-pharmacological measures as appropriate and evaluate response  - Consider cultural and social influences on pain and pain management  - Notify physician/advanced practitioner if interventions unsuccessful or patient reports new pain  Outcome: Progressing     Problem: INFECTION - ADULT  Goal: Absence or prevention of progression during hospitalization  Description: INTERVENTIONS:  - Assess and monitor for signs and symptoms of infection  - Monitor lab/diagnostic results  - Monitor all insertion sites, i.e. indwelling lines, tubes, and drains  - Monitor endotracheal if appropriate and nasal secretions for changes in amount and color  - Mutual appropriate cooling/warming therapies per order  - Administer medications as ordered  - Instruct and encourage patient and family to use good hand hygiene technique  - Identify and instruct in appropriate isolation precautions for identified infection/condition  Outcome: Progressing  Goal: Absence of fever/infection during neutropenic period  Description: INTERVENTIONS:  - Monitor WBC    Outcome: Progressing     Problem: SAFETY ADULT  Goal: Patient will remain free of falls  Description: INTERVENTIONS:  - Educate patient/family on patient safety including physical limitations  - Instruct patient to call for assistance with activity   - Consult OT/PT to assist with strengthening/mobility   - Keep Call bell within reach  - Keep bed low and locked with side rails adjusted as appropriate  - Keep care items and personal belongings within reach  - Initiate and maintain comfort rounds  - Make Fall Risk Sign visible to staff  - Apply yellow socks and bracelet  for high fall risk patients  - Consider moving patient to room near nurses station  Outcome: Progressing  Goal: Maintain or return to baseline ADL function  Description: INTERVENTIONS:  -  Assess patient's ability to carry out ADLs; assess patient's baseline for ADL function and identify physical deficits which impact ability to perform ADLs (bathing, care of mouth/teeth, toileting, grooming, dressing, etc.)  - Assess/evaluate cause of self-care deficits   - Assess range of motion  - Assess patient's mobility; develop plan if impaired  - Assess patient's need for assistive devices and provide as appropriate  - Encourage maximum independence but intervene and supervise when necessary  - Involve family in performance of ADLs  - Assess for home care needs following discharge   - Consider OT consult to assist with ADL evaluation and planning for discharge  - Provide patient education as appropriate  Outcome: Progressing  Goal: Maintains/Returns to pre admission functional level  Description: INTERVENTIONS:  - Perform AM-PAC 6 Click Basic Mobility/ Daily Activity assessment daily.  - Set and communicate daily mobility goal to care team and patient/family/caregiver.   - Collaborate with rehabilitation services on mobility goals if consulted  - Perform Range of Motion 3 times a day.  - Reposition patient every 2 hours.  - Dangle patient 3 times a day  - Stand patient 3 times a day  - Ambulate patient 3 times a day  - Out of bed to chair 3 times a day   - Out of bed for meals 3 times a day  - Out of bed for toileting  - Record patient progress and toleration of activity level   Outcome: Progressing     Problem: DISCHARGE PLANNING  Goal: Discharge to home or other facility with appropriate resources  Description: INTERVENTIONS:  - Identify barriers to discharge w/patient and caregiver  - Arrange for needed discharge resources and transportation as appropriate  - Identify discharge learning needs (meds, wound care, etc.)  -  Arrange for interpretive services to assist at discharge as needed  - Refer to Case Management Department for coordinating discharge planning if the patient needs post-hospital services based on physician/advanced practitioner order or complex needs related to functional status, cognitive ability, or social support system  Outcome: Progressing     Problem: CARDIOVASCULAR - ADULT  Goal: Maintains optimal cardiac output and hemodynamic stability  Description: INTERVENTIONS:  - Monitor I/O, vital signs and rhythm  - Monitor for S/S and trends of decreased cardiac output  - Administer and titrate ordered vasoactive medications to optimize hemodynamic stability  - Assess quality of pulses, skin color and temperature  - Assess for signs of decreased coronary artery perfusion  - Instruct patient to report change in severity of symptoms  Outcome: Progressing  Goal: Absence of cardiac dysrhythmias or at baseline rhythm  Description: INTERVENTIONS:  - Continuous cardiac monitoring, vital signs, obtain 12 lead EKG if ordered  - Administer antiarrhythmic and heart rate control medications as ordered  - Monitor electrolytes and administer replacement therapy as ordered  Outcome: Progressing     Problem: RESPIRATORY - ADULT  Goal: Achieves optimal ventilation and oxygenation  Description: INTERVENTIONS:  - Assess for changes in respiratory status  - Assess for changes in mentation and behavior  - Position to facilitate oxygenation and minimize respiratory effort  - Oxygen administered by appropriate delivery if ordered  - Initiate smoking cessation education as indicated  - Encourage broncho-pulmonary hygiene including cough, deep breathe, Incentive Spirometry  - Assess the need for suctioning and aspirate as needed  - Assess and instruct to report SOB or any respiratory difficulty  - Respiratory Therapy support as indicated  Outcome: Progressing

## 2024-03-11 LAB
ANION GAP SERPL CALCULATED.3IONS-SCNC: 8 MMOL/L
BUN SERPL-MCNC: 15 MG/DL (ref 5–25)
CALCIUM SERPL-MCNC: 9.3 MG/DL (ref 8.4–10.2)
CHLORIDE SERPL-SCNC: 102 MMOL/L (ref 96–108)
CO2 SERPL-SCNC: 28 MMOL/L (ref 21–32)
CREAT SERPL-MCNC: 0.61 MG/DL (ref 0.6–1.3)
ERYTHROCYTE [DISTWIDTH] IN BLOOD BY AUTOMATED COUNT: 14.6 % (ref 11.6–15.1)
GFR SERPL CREATININE-BSD FRML MDRD: 112 ML/MIN/1.73SQ M
GLUCOSE SERPL-MCNC: 134 MG/DL (ref 65–140)
HCT VFR BLD AUTO: 43.6 % (ref 34.8–46.1)
HGB BLD-MCNC: 14.4 G/DL (ref 11.5–15.4)
MCH RBC QN AUTO: 30.4 PG (ref 26.8–34.3)
MCHC RBC AUTO-ENTMCNC: 33 G/DL (ref 31.4–37.4)
MCV RBC AUTO: 92 FL (ref 82–98)
PLATELET # BLD AUTO: 358 THOUSANDS/UL (ref 149–390)
PMV BLD AUTO: 10.3 FL (ref 8.9–12.7)
POTASSIUM SERPL-SCNC: 4.1 MMOL/L (ref 3.5–5.3)
RBC # BLD AUTO: 4.74 MILLION/UL (ref 3.81–5.12)
SODIUM SERPL-SCNC: 138 MMOL/L (ref 135–147)
WBC # BLD AUTO: 13.38 THOUSAND/UL (ref 4.31–10.16)

## 2024-03-11 PROCEDURE — 94640 AIRWAY INHALATION TREATMENT: CPT

## 2024-03-11 PROCEDURE — 94760 N-INVAS EAR/PLS OXIMETRY 1: CPT

## 2024-03-11 PROCEDURE — 94150 VITAL CAPACITY TEST: CPT

## 2024-03-11 PROCEDURE — 80048 BASIC METABOLIC PNL TOTAL CA: CPT | Performed by: PHYSICIAN ASSISTANT

## 2024-03-11 PROCEDURE — 85027 COMPLETE CBC AUTOMATED: CPT | Performed by: PHYSICIAN ASSISTANT

## 2024-03-11 PROCEDURE — 99232 SBSQ HOSP IP/OBS MODERATE 35: CPT | Performed by: PHYSICIAN ASSISTANT

## 2024-03-11 RX ORDER — METHYLPREDNISOLONE SODIUM SUCCINATE 40 MG/ML
40 INJECTION, POWDER, LYOPHILIZED, FOR SOLUTION INTRAMUSCULAR; INTRAVENOUS EVERY 12 HOURS SCHEDULED
Status: DISCONTINUED | OUTPATIENT
Start: 2024-03-11 | End: 2024-03-12 | Stop reason: HOSPADM

## 2024-03-11 RX ADMIN — HYDROCHLOROTHIAZIDE 12.5 MG: 12.5 TABLET ORAL at 08:51

## 2024-03-11 RX ADMIN — ENOXAPARIN SODIUM 40 MG: 40 INJECTION SUBCUTANEOUS at 08:50

## 2024-03-11 RX ADMIN — BUDESONIDE AND FORMOTEROL FUMARATE DIHYDRATE 2 PUFF: 160; 4.5 AEROSOL RESPIRATORY (INHALATION) at 08:48

## 2024-03-11 RX ADMIN — METHYLPREDNISOLONE SODIUM SUCCINATE 40 MG: 40 INJECTION, POWDER, FOR SOLUTION INTRAMUSCULAR; INTRAVENOUS at 21:02

## 2024-03-11 RX ADMIN — LEVALBUTEROL HYDROCHLORIDE 1.25 MG: 1.25 SOLUTION RESPIRATORY (INHALATION) at 02:43

## 2024-03-11 RX ADMIN — LEVALBUTEROL HYDROCHLORIDE 1.25 MG: 1.25 SOLUTION RESPIRATORY (INHALATION) at 20:10

## 2024-03-11 RX ADMIN — NICOTINE 7 MG/24 HR DAILY TRANSDERMAL PATCH 1 PATCH: at 08:50

## 2024-03-11 RX ADMIN — CARIPRAZINE 1.5 MG: 1.5 CAPSULE, GELATIN COATED ORAL at 08:50

## 2024-03-11 RX ADMIN — CEFTRIAXONE 1000 MG: 1 INJECTION, SOLUTION INTRAVENOUS at 14:25

## 2024-03-11 RX ADMIN — METHYLPREDNISOLONE SODIUM SUCCINATE 40 MG: 40 INJECTION, POWDER, FOR SOLUTION INTRAMUSCULAR; INTRAVENOUS at 09:19

## 2024-03-11 RX ADMIN — IPRATROPIUM BROMIDE 0.5 MG: 0.5 SOLUTION RESPIRATORY (INHALATION) at 02:43

## 2024-03-11 RX ADMIN — IPRATROPIUM BROMIDE 0.5 MG: 0.5 SOLUTION RESPIRATORY (INHALATION) at 07:30

## 2024-03-11 RX ADMIN — HYDROCODONE BITARTRATE AND HOMATROPINE METHYLBROMIDE 5 ML: 5; 1.5 SYRUP ORAL at 10:19

## 2024-03-11 RX ADMIN — BUDESONIDE AND FORMOTEROL FUMARATE DIHYDRATE 2 PUFF: 160; 4.5 AEROSOL RESPIRATORY (INHALATION) at 16:01

## 2024-03-11 RX ADMIN — LOSARTAN POTASSIUM 25 MG: 25 TABLET, FILM COATED ORAL at 08:51

## 2024-03-11 RX ADMIN — IPRATROPIUM BROMIDE 0.5 MG: 0.5 SOLUTION RESPIRATORY (INHALATION) at 20:10

## 2024-03-11 RX ADMIN — LEVALBUTEROL HYDROCHLORIDE 1.25 MG: 1.25 SOLUTION RESPIRATORY (INHALATION) at 07:30

## 2024-03-11 RX ADMIN — LEVALBUTEROL HYDROCHLORIDE 1.25 MG: 1.25 SOLUTION RESPIRATORY (INHALATION) at 14:06

## 2024-03-11 RX ADMIN — IPRATROPIUM BROMIDE 0.5 MG: 0.5 SOLUTION RESPIRATORY (INHALATION) at 14:06

## 2024-03-11 NOTE — ASSESSMENT & PLAN NOTE
Persistently elevated blood pressure despite given intravenous hydralazine.  Started on hyzaar 50-12.5 mg yesterday  HcG negative

## 2024-03-11 NOTE — ASSESSMENT & PLAN NOTE
History of asthma and depression who presents to the hospital with worsening shortness of breath and asthma flare  Increased work of breathing with hypoxia requiring supplemental oxygen.  Prior to admission on albuterol and symbicort.  Continue IV methylprednisolone q12 today, switch to oral prednisone tomorrow  Continue Xopenex, Atrovent  Symbicort  CXR WNL, procalcitonin normal

## 2024-03-11 NOTE — ASSESSMENT & PLAN NOTE
Monitor with a.m. labs for steroid-induced hyperglycemia    Results from last 7 days   Lab Units 03/11/24  0507 03/10/24  0606 03/09/24  0910   GLUCOSE RANDOM mg/dL 134 135 167*

## 2024-03-11 NOTE — UTILIZATION REVIEW
Notification of Unplanned, Urgent, or   Emergency Inpatient Admission   AUTHORIZATION REQUEST   [place] [address]  Tax ID: [id number]  NPI: [id number]  Place of Service: Acute Care Hospital  Admission Level of Care: Inpatient  Place of Service Code: 21     Attending Physician Information  Attending Name and NPI#: Charles Mejiaagkristopher  [[id number]]  Phone: [phone]     Admission Information  Inpatient Admission Date/Time: 3/9/24 12:41 PM  Discharge Date/Time: No discharge date for patient encounter.  Admitting Diagnosis Code/Description:  Asthma [J45.909]  Hypoxia [R09.02]  Acute asthma exacerbation [J45.901]     Utilization Review Contact  Hannah Lebron Utilization   Phone: [phone]  Fax: [phone]  Email: [email or web address]  Contact for approvals/pending authorizations, clinical reviews, and discharge.     Physician Advisory Services Contact  Medical Necessity Denial & Mkge-rp-Mmyb Discussion  Phone: [phone]  Fax: [phone]  Email: [email or web address]     DISCHARGE SUPPORT TEAM:  For Patients Discharge Needs & Updates  Phone: [phone] opt. 2 Fax: [phone]  Email: [email or web address]

## 2024-03-11 NOTE — PROGRESS NOTES
[facility]  Progress Note  Name: Kendra Trotter I  MRN: [id number]  Unit/Bed#: [place] [address] -02 I Date of Admission: 3/9/2024   Date of Service: 3/11/2024 I Hospital Day: 2    Assessment/Plan   * Asthma exacerbation  Assessment & Plan  History of asthma and depression who presents to the hospital with worsening shortness of breath and asthma flare  Increased work of breathing with hypoxia requiring supplemental oxygen.  Prior to admission on albuterol and symbicort.  Continue IV methylprednisolone q12 today, switch to oral prednisone tomorrow  Continue Xopenex, Atrovent  Symbicort  CXR WNL, procalcitonin normal    Acute hypoxic respiratory failure (HCC)  Assessment & Plan  Requiring up to 5 L NC O2  Due to asthma    Elevated blood pressure reading without diagnosis of hypertension  Assessment & Plan  Persistently elevated blood pressure despite given intravenous hydralazine.  Started on hyzaar 50-12.5 mg yesterday  HcG negative    Prediabetes  Assessment & Plan  Monitor with a.m. labs for steroid-induced hyperglycemia    Results from last 7 days   Lab Units 03/11/24  0507 03/10/24  0606 03/09/24  0910   GLUCOSE RANDOM mg/dL 134 135 167*         Hypokalemia  Assessment & Plan  Replace and recheck tomorrow    Mood disorder (HCC)  Assessment & Plan  Stable continue Vraylar         VTE Pharmacologic Prophylaxis: VTE Score: 3 Moderate Risk (Score 3-4) - Pharmacological DVT Prophylaxis Ordered: enoxaparin (Lovenox).    Mobility:   Basic Mobility Inpatient Raw Score: 24  JH-HLM Goal: 8: Walk 250 feet or more  JH-HLM Achieved: 7: Walk 25 feet or more  HLM Goal NOT achieved. Continue with multidisciplinary rounding and encourage appropriate mobility to improve upon HLM goals.    Patient Centered Rounds: I performed bedside rounds with nursing staff today.   Discussions with Specialists or Other Care Team Provider: CM    Education and Discussions with Family / Patient: Patient declined call to  .     Total Time Spent on Date of Encounter in care of patient: 25 mins. This time was spent on one or more of the following: performing physical exam; counseling and coordination of care; obtaining or reviewing history; documenting in the medical record; reviewing/ordering tests, medications or procedures; communicating with other healthcare professionals and discussing with patient's family/caregivers.    Current Length of Stay: 2 day(s)  Current Patient Status: Inpatient   Certification Statement: The patient will continue to require additional inpatient hospital stay due to asthma  Discharge Plan: Anticipate discharge tomorrow to home.    Code Status: Level 1 - Full Code    Subjective:   The patient is seen resting in bed, She is feeling much improved, her SOB has subsided but she is still on some oxygen. She denies any chest pain, palpitations, nausea or vomiting.    Objective:     Vitals:   Temp (24hrs), Av.5 °F (36.9 °C), Min:98 °F (36.7 °C), Max:99.6 °F (37.6 °C)    Temp:  [98 °F (36.7 °C)-99.6 °F (37.6 °C)] 98 °F (36.7 °C)  HR:  [] 93  Resp:  [18] 18  BP: (121-165)/() 121/86  SpO2:  [92 %-97 %] 97 %  Body mass index is 30.55 kg/m².     Input and Output Summary (last 24 hours):   No intake or output data in the 24 hours ending 24 1148    Physical Exam:   Physical Exam  Vitals and nursing note reviewed.   Constitutional:       General: She is not in acute distress.     Appearance: Normal appearance. She is not ill-appearing, toxic-appearing or diaphoretic.   HENT:      Head: Normocephalic and atraumatic.   Cardiovascular:      Rate and Rhythm: Normal rate and regular rhythm.      Heart sounds: No murmur heard.     No friction rub. No gallop.   Pulmonary:      Effort: Pulmonary effort is normal. No respiratory distress.      Breath sounds: Wheezing present. No rhonchi or rales.      Comments: Mild expiratory wheezing  Abdominal:      General: Abdomen is flat. Bowel sounds  are normal. There is no distension.      Palpations: Abdomen is soft.      Tenderness: There is no abdominal tenderness.   Musculoskeletal:      Right lower leg: No edema.      Left lower leg: No edema.   Skin:     General: Skin is warm and dry.      Coloration: Skin is not jaundiced or pale.   Neurological:      General: No focal deficit present.      Mental Status: She is alert. Mental status is at baseline.        Additional Data:     Labs:  Results from last 7 days   Lab Units 03/11/24  0507 03/10/24  0606 03/09/24  0910   WBC Thousand/uL 13.38*   < > 8.59   HEMOGLOBIN g/dL 14.4   < > 13.6   HEMATOCRIT % 43.6   < > 41.4   PLATELETS Thousands/uL 358   < > 321   NEUTROS PCT %  --   --  77*   LYMPHS PCT %  --   --  14   MONOS PCT %  --   --  6   EOS PCT %  --   --  2    < > = values in this interval not displayed.     Results from last 7 days   Lab Units 03/11/24  0507 03/10/24  0606   SODIUM mmol/L 138 136   POTASSIUM mmol/L 4.1 3.9   CHLORIDE mmol/L 102 103   CO2 mmol/L 28 26   BUN mg/dL 15 11   CREATININE mg/dL 0.61 0.58*   ANION GAP mmol/L 8 7   CALCIUM mg/dL 9.3 9.4   ALBUMIN g/dL  --  4.4   TOTAL BILIRUBIN mg/dL  --  0.29   ALK PHOS U/L  --  83   ALT U/L  --  11   AST U/L  --  12*   GLUCOSE RANDOM mg/dL 134 135                       Lines/Drains:  Invasive Devices       Peripheral Intravenous Line  Duration             Peripheral IV 03/09/24 Left;Ventral (anterior) Forearm 2 days                          Imaging: No pertinent imaging reviewed.    Recent Cultures (last 7 days):         Last 24 Hours Medication List:   Current Facility-Administered Medications   Medication Dose Route Frequency Provider Last Rate    acetaminophen  650 mg Oral Q4H PRN Frank Gomez, DO      albuterol  2 puff Inhalation Q6H PRN Frank Gomez, DO      budesonide-formoterol  2 puff Inhalation BID Frank Gomez, DO      cariprazine  1.5 mg Oral Daily Frank Gomez,       cefTRIAXone  1,000 mg Intravenous Q24H Frank Gomez DO  1,000 mg (03/10/24 1558)    enoxaparin  40 mg Subcutaneous Daily Frank Gomez, DO      hydrALAZINE  10 mg Intravenous Q4H PRN Delia Christensen PA-C      losartan  25 mg Oral Daily Tre Castorena PA-C      And    hydroCHLOROthiazide  12.5 mg Oral Daily Tre Castorena PA-C      HYDROcodone Bit-Homatrop MBr  5 mL Oral Q4H PRN Frank Gomez, DO      hydrOXYzine HCL  25 mg Oral Q6H PRN Frank Gomez, DO      influenza vaccine  0.5 mL Intramuscular Prior to discharge Frank Gomez, DO      ipratropium  0.5 mg Nebulization Q6H Frank Gomez, DO      levalbuterol  1.25 mg Nebulization Q6H Delia Christensen PA-C      methylPREDNISolone sodium succinate  40 mg Intravenous Q12H LUPE Tre Castorena PA-C      nicotine  1 patch Transdermal Daily Frank Gomez,       ondansetron  4 mg Intravenous Q4H PRN Frank Gomez, DO      oxyCODONE  5 mg Oral Q4H PRN Frank Gomez, DO          Today, Patient Was Seen By: Tre Castorena PA-C    **Please Note: This note may have been constructed using a voice recognition system.**

## 2024-03-11 NOTE — PLAN OF CARE
Problem: PAIN - ADULT  Goal: Verbalizes/displays adequate comfort level or baseline comfort level  Description: Interventions:  - Encourage patient to monitor pain and request assistance  - Assess pain using appropriate pain scale  - Administer analgesics based on type and severity of pain and evaluate response  - Implement non-pharmacological measures as appropriate and evaluate response  - Consider cultural and social influences on pain and pain management  - Notify physician/advanced practitioner if interventions unsuccessful or patient reports new pain  Outcome: Progressing     Problem: INFECTION - ADULT  Goal: Absence or prevention of progression during hospitalization  Description: INTERVENTIONS:  - Assess and monitor for signs and symptoms of infection  - Monitor lab/diagnostic results  - Monitor all insertion sites, i.e. indwelling lines, tubes, and drains  - Monitor endotracheal if appropriate and nasal secretions for changes in amount and color  - [place] appropriate cooling/warming therapies per order  - Administer medications as ordered  - Instruct and encourage patient and family to use good hand hygiene technique  - Identify and instruct in appropriate isolation precautions for identified infection/condition  Outcome: Progressing  Goal: Absence of fever/infection during neutropenic period  Description: INTERVENTIONS:  - Monitor WBC    Outcome: Progressing     Problem: SAFETY ADULT  Goal: Patient will remain free of falls  Description: INTERVENTIONS:  - Educate patient/family on patient safety including physical limitations  - Instruct patient to call for assistance with activity   - Consult OT/PT to assist with strengthening/mobility   - Keep Call bell within reach  - Keep bed low and locked with side rails adjusted as appropriate  - Keep care items and personal belongings within reach  - Initiate and maintain comfort rounds  - Make Fall Risk Sign visible to staff  - Offer Toileting every  Hours,  in advance of need  - Initiate/Maintain alarm  - Obtain necessary fall risk management equipment:   - Apply yellow socks and bracelet for high fall risk patients  - Consider moving patient to room near nurses station  Outcome: Progressing  Goal: Maintain or return to baseline ADL function  Description: INTERVENTIONS:  -  Assess patient's ability to carry out ADLs; assess patient's baseline for ADL function and identify physical deficits which impact ability to perform ADLs (bathing, care of mouth/teeth, toileting, grooming, dressing, etc.)  - Assess/evaluate cause of self-care deficits   - Assess range of motion  - Assess patient's mobility; develop plan if impaired  - Assess patient's need for assistive devices and provide as appropriate  - Encourage maximum independence but intervene and supervise when necessary  - Involve family in performance of ADLs  - Assess for home care needs following discharge   - Consider OT consult to assist with ADL evaluation and planning for discharge  - Provide patient education as appropriate  Outcome: Progressing  Goal: Maintains/Returns to pre admission functional level  Description: INTERVENTIONS:  - Perform AM-PAC 6 Click Basic Mobility/ Daily Activity assessment daily.  - Set and communicate daily mobility goal to care team and patient/family/caregiver.   - Collaborate with rehabilitation services on mobility goals if consulted  - Perform Range of Motion  times a day.  - Reposition patient every  hours.  - Dangle patient  times a day  - Stand patient  times a day  - Ambulate patient  times a day  - Out of bed to chair  times a day   - Out of bed for meals  times a day  - Out of bed for toileting  - Record patient progress and toleration of activity level   Outcome: Progressing     Problem: DISCHARGE PLANNING  Goal: Discharge to home or other facility with appropriate resources  Description: INTERVENTIONS:  - Identify barriers to discharge w/patient and caregiver  - Arrange for  needed discharge resources and transportation as appropriate  - Identify discharge learning needs (meds, wound care, etc.)  - Arrange for interpretive services to assist at discharge as needed  - Refer to Case Management Department for coordinating discharge planning if the patient needs post-hospital services based on physician/advanced practitioner order or complex needs related to functional status, cognitive ability, or social support system  Outcome: Progressing     Problem: CARDIOVASCULAR - ADULT  Goal: Maintains optimal cardiac output and hemodynamic stability  Description: INTERVENTIONS:  - Monitor I/O, vital signs and rhythm  - Monitor for S/S and trends of decreased cardiac output  - Administer and titrate ordered vasoactive medications to optimize hemodynamic stability  - Assess quality of pulses, skin color and temperature  - Assess for signs of decreased coronary artery perfusion  - Instruct patient to report change in severity of symptoms  Outcome: Progressing  Goal: Absence of cardiac dysrhythmias or at baseline rhythm  Description: INTERVENTIONS:  - Continuous cardiac monitoring, vital signs, obtain 12 lead EKG if ordered  - Administer antiarrhythmic and heart rate control medications as ordered  - Monitor electrolytes and administer replacement therapy as ordered  Outcome: Progressing     Problem: RESPIRATORY - ADULT  Goal: Achieves optimal ventilation and oxygenation  Description: INTERVENTIONS:  - Assess for changes in respiratory status  - Assess for changes in mentation and behavior  - Position to facilitate oxygenation and minimize respiratory effort  - Oxygen administered by appropriate delivery if ordered  - Initiate smoking cessation education as indicated  - Encourage broncho-pulmonary hygiene including cough, deep breathe, Incentive Spirometry  - Assess the need for suctioning and aspirate as needed  - Assess and instruct to report SOB or any respiratory difficulty  - Respiratory Therapy  support as indicated  Outcome: Progressing     Problem: COPING  Goal: Pt/Family able to verbalize concerns and demonstrate effective coping strategies  Description: INTERVENTIONS:  - Assist patient/family to identify coping skills, available support systems and cultural and spiritual values  - Provide emotional support, including active listening and acknowledgement of concerns of patient and caregivers  - Reduce environmental stimuli, as able  - Provide patient education  - Assess for spiritual pain/suffering and initiate spiritual care, including notification of Pastoral Care or sharmila based community as needed  - Assess effectiveness of coping strategies  Outcome: Progressing

## 2024-03-11 NOTE — PLAN OF CARE
Problem: PAIN - ADULT  Goal: Verbalizes/displays adequate comfort level or baseline comfort level  Description: Interventions:  - Encourage patient to monitor pain and request assistance  - Assess pain using appropriate pain scale  - Administer analgesics based on type and severity of pain and evaluate response  - Implement non-pharmacological measures as appropriate and evaluate response  - Consider cultural and social influences on pain and pain management  - Notify physician/advanced practitioner if interventions unsuccessful or patient reports new pain  Outcome: Progressing     Problem: INFECTION - ADULT  Goal: Absence or prevention of progression during hospitalization  Description: INTERVENTIONS:  - Assess and monitor for signs and symptoms of infection  - Monitor lab/diagnostic results  - Monitor all insertion sites, i.e. indwelling lines, tubes, and drains  - Monitor endotracheal if appropriate and nasal secretions for changes in amount and color  - [place] appropriate cooling/warming therapies per order  - Administer medications as ordered  - Instruct and encourage patient and family to use good hand hygiene technique  - Identify and instruct in appropriate isolation precautions for identified infection/condition  Outcome: Progressing  Goal: Absence of fever/infection during neutropenic period  Description: INTERVENTIONS:  - Monitor WBC    Outcome: Progressing     Problem: SAFETY ADULT  Goal: Patient will remain free of falls  Description: INTERVENTIONS:  - Educate patient/family on patient safety including physical limitations  - Instruct patient to call for assistance with activity   - Consult OT/PT to assist with strengthening/mobility   - Keep Call bell within reach  - Keep bed low and locked with side rails adjusted as appropriate  - Keep care items and personal belongings within reach  - Initiate and maintain comfort rounds  - Make Fall Risk Sign visible to staff  - Offer Toileting every  Hours,  in advance of need  - Initiate/Maintain alarm  - Obtain necessary fall risk management equipment:   - Apply yellow socks and bracelet for high fall risk patients  - Consider moving patient to room near nurses station  Outcome: Progressing  Goal: Maintain or return to baseline ADL function  Description: INTERVENTIONS:  -  Assess patient's ability to carry out ADLs; assess patient's baseline for ADL function and identify physical deficits which impact ability to perform ADLs (bathing, care of mouth/teeth, toileting, grooming, dressing, etc.)  - Assess/evaluate cause of self-care deficits   - Assess range of motion  - Assess patient's mobility; develop plan if impaired  - Assess patient's need for assistive devices and provide as appropriate  - Encourage maximum independence but intervene and supervise when necessary  - Involve family in performance of ADLs  - Assess for home care needs following discharge   - Consider OT consult to assist with ADL evaluation and planning for discharge  - Provide patient education as appropriate  Outcome: Progressing  Goal: Maintains/Returns to pre admission functional level  Description: INTERVENTIONS:  - Perform AM-PAC 6 Click Basic Mobility/ Daily Activity assessment daily.  - Set and communicate daily mobility goal to care team and patient/family/caregiver.   - Collaborate with rehabilitation services on mobility goals if consulted  - Perform Range of Motion  times a day.  - Reposition patient every  hours.  - Dangle patient  times a day  - Stand patient  times a day  - Ambulate patient  times a day  - Out of bed to chair times a day   - Out of bed for meals  times a day  - Out of bed for toileting  - Record patient progress and toleration of activity level   Outcome: Progressing     Problem: DISCHARGE PLANNING  Goal: Discharge to home or other facility with appropriate resources  Description: INTERVENTIONS:  - Identify barriers to discharge w/patient and caregiver  - Arrange for  needed discharge resources and transportation as appropriate  - Identify discharge learning needs (meds, wound care, etc.)  - Arrange for interpretive services to assist at discharge as needed  - Refer to Case Management Department for coordinating discharge planning if the patient needs post-hospital services based on physician/advanced practitioner order or complex needs related to functional status, cognitive ability, or social support system  Outcome: Progressing     Problem: CARDIOVASCULAR - ADULT  Goal: Maintains optimal cardiac output and hemodynamic stability  Description: INTERVENTIONS:  - Monitor I/O, vital signs and rhythm  - Monitor for S/S and trends of decreased cardiac output  - Administer and titrate ordered vasoactive medications to optimize hemodynamic stability  - Assess quality of pulses, skin color and temperature  - Assess for signs of decreased coronary artery perfusion  - Instruct patient to report change in severity of symptoms  Outcome: Progressing  Goal: Absence of cardiac dysrhythmias or at baseline rhythm  Description: INTERVENTIONS:  - Continuous cardiac monitoring, vital signs, obtain 12 lead EKG if ordered  - Administer antiarrhythmic and heart rate control medications as ordered  - Monitor electrolytes and administer replacement therapy as ordered  Outcome: Progressing     Problem: RESPIRATORY - ADULT  Goal: Achieves optimal ventilation and oxygenation  Description: INTERVENTIONS:  - Assess for changes in respiratory status  - Assess for changes in mentation and behavior  - Position to facilitate oxygenation and minimize respiratory effort  - Oxygen administered by appropriate delivery if ordered  - Initiate smoking cessation education as indicated  - Encourage broncho-pulmonary hygiene including cough, deep breathe, Incentive Spirometry  - Assess the need for suctioning and aspirate as needed  - Assess and instruct to report SOB or any respiratory difficulty  - Respiratory Therapy  support as indicated  Outcome: Progressing     Problem: COPING  Goal: Pt/Family able to verbalize concerns and demonstrate effective coping strategies  Description: INTERVENTIONS:  - Assist patient/family to identify coping skills, available support systems and cultural and spiritual values  - Provide emotional support, including active listening and acknowledgement of concerns of patient and caregivers  - Reduce environmental stimuli, as able  - Provide patient education  - Assess for spiritual pain/suffering and initiate spiritual care, including notification of Pastoral Care or sharmila based community as needed  - Assess effectiveness of coping strategies  Outcome: Progressing

## 2024-03-11 NOTE — UTILIZATION REVIEW
Initial Clinical Review    Admission: Date/Time/Statement:   Admission Orders (From admission, onward)       Ordered        03/09/24 1241  INPATIENT ADMISSION  Once                          Orders Placed This Encounter   Procedures    INPATIENT ADMISSION     Standing Status:   Standing     Number of Occurrences:   1     Order Specific Question:   Level of Care     Answer:   Med Surg [16]     Order Specific Question:   Estimated length of stay     Answer:   More than 2 Midnights     Order Specific Question:   Certification     Answer:   I certify that inpatient services are medically necessary for this patient for a duration of greater than two midnights. See H&P and MD Progress Notes for additional information about the patient's course of treatment.     ED Arrival Information       Expected   -    Arrival   3/9/2024 08:53    Acuity   Emergent              Means of arrival   Walk-In    Escorted by   Self    Service   Hospitalist    Admission type   Emergency              Arrival complaint   asthma sob             Chief Complaint   Patient presents with    Asthma     Began last PM while at work. Pt reports taking meds as Rx'd without relief.       Initial Presentation: 43 y.o. female presents to the ED from home with c/o worsening SOB unrelieved with home nebs.  PMH: asthma, depression.  In the ED was tachycardic, tachypnic, HTN, hypoxic, placed on 4L oxygen, had multiple neb tx, unresolved wheezing.  Labs - K 3.3.  Imaging - no acute disease.  Treated with nebs, IV SoluMedrol and IV Mag.  On exam continued wheezing, decreased breath sounds, tachycardia.  Admitted to INPATIENT status with Asthma exac - oxygen, IV steroids,  bronchodilator nebs, IV antibiotics. Prediabetes - trend glucose.  Hypokalemia - replete, trend.     Date: 3/10   Day 2:   [place] resp status overnight with increased tachycardic, diminished respiratory excursion and mid to end expiratory faint polyphonic wheezing and tripoding. Terbutaline  given x1, increase xopenex to q6h start atrovent nebulizer q6h. Had improvement post intervention.  No changes to treatment, oxygen to 2L NC.  Feels improvement on exam, breathing improved.      ED Triage Vitals   Temperature Pulse Respirations Blood Pressure SpO2   03/09/24 0856 03/09/24 0856 03/09/24 0856 03/09/24 0856 03/09/24 0856   98.9 °F (37.2 °C) (!) 115 (!) 32 (!) 157/106 (!) 89 %      Temp Source Heart Rate Source Patient Position - Orthostatic VS BP Location FiO2 (%)   03/09/24 0856 03/09/24 0945 03/09/24 0856 03/09/24 0856 --   Oral Monitor Sitting Right arm       Pain Score       03/09/24 0856       No Pain          Wt Readings from Last 1 Encounters:   03/09/24 80.7 kg (178 lb)     Additional Vital Signs:   Date/Time Temp Pulse Resp BP MAP (mmHg) SpO2 Calculated FIO2 (%) - Nasal Cannula Nasal Cannula O2 Flow Rate (L/min) O2 Device Patient Position - Orthostatic VS   03/11/24 07:43:53 98 °F (36.7 °C) 93 18 121/86 98 97 % -- -- Nasal cannula Sitting   03/11/24 0730 -- -- -- -- -- 92 % 32 3 L/min Nasal cannula --   03/11/24 0100 -- -- -- -- -- 94 % -- -- -- --   03/10/24 22:47:12 -- 97 -- 138/89 105 95 % -- -- -- --   03/10/24 20:42:14 99.6 °F (37.6 °C) 108 Abnormal  18 162/107 Abnormal  125 93 % 28 2 L/min Nasal cannula --   03/10/24 1925 -- -- -- -- -- 93 % 28 2 L/min Nasal cannula --   03/10/24 18:38:51 -- 100 -- 164/109 Abnormal  127 92 % -- -- -- --   03/10/24 15:33:29 98 °F (36.7 °C) 97 -- 163/109 Abnormal  127 93 % 28 2 L/min -- --   03/10/24 12:54:59 -- 106 Abnormal  -- 165/110 Abnormal  128 93 % -- -- -- --   03/10/24 09:57:09 -- 98 -- 166/110 Abnormal  129 94 % -- -- -- --   03/10/24 08:06:13 98.5 °F (36.9 °C) 103 24 Abnormal  168/113 Abnormal  131 98 % 32 3 L/min Nasal cannula --   03/10/24 07:44:15 98.5 °F (36.9 °C) 110 Abnormal  -- 174/116 Abnormal  135 95 % -- -- -- --   03/10/24 05:58:42 -- -- -- 174/119 Abnormal  137 -- -- -- -- --   03/10/24 0059 -- -- -- -- -- 93 % -- -- -- --   03/10/24  00:53:19 98.3 °F (36.8 °C) 107 Abnormal  -- 170/114 Abnormal  133 92 % -- -- -- --   03/09/24 23:56:33 -- 108 Abnormal  20 166/107 Abnormal  127 92 % -- -- -- --   03/09/24 2015 -- -- -- -- -- 91 % -- -- Nasal cannula --   03/09/24 20:14:42 99.7 °F (37.6 °C) 101 20 163/110 Abnormal  128 88 % Abnormal  -- -- -- --   03/09/24 1959 -- -- -- -- -- -- 44 6 L/min Nasal cannula --   03/09/24 17:59:33 -- 101 -- 164/111 Abnormal   129 97 % -- -- -- --   03/09/24 17:03:36 -- 100 -- 162/112 Abnormal  129 93 % -- -- -- --   03/09/24 16:51:44 98.7 °F (37.1 °C) 99 -- 160/108 Abnormal  125 95 % -- -- -- --   03/09/24 16:51:12 -- 98 -- 160/108 Abnormal   125 93 % -- -- -- --   03/09/24 16:00:11 -- 95 -- 163/108 Abnormal   126 92 % -- -- -- --   BP: hydralazine administered. SLIM aware at 03/09/24 1600   03/09/24 1600 -- -- -- -- -- -- 44 6 L/min Nasal cannula --   03/09/24 15:02:40 -- 95 -- 166/108 Abnormal  127 97 % -- -- -- --   03/09/24 14:42:47 -- 106 Abnormal  18 160/107 Abnormal  125 88 % Abnormal  -- -- -- --   03/09/24 1415 -- 98 18 148/88 113 93 % 36 4 L/min Nasal cannula --   03/09/24 1245 -- 96 20 146/99 116 92 % 36 4 L/min Nasal cannula Lying   03/09/24 1145 -- 94 21 148/78 103 91 % 36 4 L/min Nasal cannula Lying   03/09/24 1045 -- 94 20 145/82 107 96 % -- -- None (Room air) Sitting   03/09/24 0945 -- 100 24 Abnormal  141/78 103 90 % -- -- None (Room air) Sitting   03/09/24 0928 -- -- -- -- -- 94 % -- -- -- --   03/09/24 0924 -- -- -- -- -- 90 % -- -- None (Room air) --       Pertinent Labs/Diagnostic Test Results:     3/9 ECG - Sinus tachycardia  Right atrial enlargement  Borderline ECG    XR chest 2 views   ED Interpretation by Roxann Morgan DO (03/09 1221)   No acute disease    Image independently interpreted by myself        Final Result by Moris Brown MD (03/09 1250)      No acute cardiopulmonary disease.            Workstation performed: [id number]               Results from last 7 days   Lab Units  03/11/24  0507 03/10/24  0606 03/09/24  0910   WBC Thousand/uL 13.38* 14.02* 8.59   HEMOGLOBIN g/dL 14.4 14.7 13.6   HEMATOCRIT % 43.6 44.0 41.4   PLATELETS Thousands/uL 358 349 321   NEUTROS ABS Thousands/µL  --   --  6.65         Results from last 7 days   Lab Units 03/11/24  0507 03/10/24  0606 03/09/24  0910   SODIUM mmol/L 138 136 136   POTASSIUM mmol/L 4.1 3.9 3.3*   CHLORIDE mmol/L 102 103 103   CO2 mmol/L 28 26 26   ANION GAP mmol/L 8 7 7   BUN mg/dL 15 11 9   CREATININE mg/dL 0.61 0.58* 0.58*   EGFR ml/min/1.73sq m 112 114 114   CALCIUM mg/dL 9.3 9.4 8.7     Results from last 7 days   Lab Units 03/10/24  0606   AST U/L 12*   ALT U/L 11   ALK PHOS U/L 83   TOTAL PROTEIN g/dL 8.3   ALBUMIN g/dL 4.4   TOTAL BILIRUBIN mg/dL 0.29         Results from last 7 days   Lab Units 03/11/24  0507 03/10/24  0606 03/09/24  0910   GLUCOSE RANDOM mg/dL 134 135 167*     ED Treatment:   Medication Administration from 03/09/2024 0853 to 03/09/2024 1437         Date/Time Order Dose Route Action     03/09/2024 0905 EST albuterol inhalation solution 5 mg 5 mg Nebulization Given     03/09/2024 0905 EST ipratropium (ATROVENT) 0.02 % inhalation solution 0.5 mg 0.5 mg Nebulization Given     03/09/2024 0928 EST albuterol inhalation solution 10 mg 10 mg Nebulization Given     03/09/2024 0928 EST ipratropium (ATROVENT) 0.02 % inhalation solution 1 mg 1 mg Nebulization Given     03/09/2024 0928 EST sodium chloride 0.9 % inhalation solution 12 mL 12 mL Nebulization Given     03/09/2024 0910 EST methylPREDNISolone sodium succinate (Solu-MEDROL) injection 125 mg 125 mg Intravenous Given     03/09/2024 0910 EST magnesium sulfate 2 g/50 mL IVPB (premix) 2 g 2 g Intravenous New Bag          Past Medical History:   Diagnosis Date    Asthma     Bipolar disorder (HCC)      Present on Admission:   Prediabetes   Hypokalemia   Mood disorder (HCC)   Asthma exacerbation      Admitting Diagnosis: Asthma [J45.909]  Hypoxia [R09.02]  Acute asthma  exacerbation [J45.901]  Age/Sex: 43 y.o. female  Admission Orders:  Scheduled Medications:  budesonide-formoterol, 2 puff, Inhalation, BID  cariprazine, 1.5 mg, Oral, Daily  cefTRIAXone, 1,000 mg, Intravenous, Q24H  enoxaparin, 40 mg, Subcutaneous, Daily  losartan, 25 mg, Oral, Daily   And  hydroCHLOROthiazide, 12.5 mg, Oral, Daily  ipratropium, 0.5 mg, Nebulization, Q6H  levalbuterol, 1.25 mg, Nebulization, Q6H  methylPREDNISolone sodium succinate, 40 mg, Intravenous, Q12H LUPE  nicotine, 1 patch, Transdermal, Daily      Continuous IV Infusions:     PRN Meds:  acetaminophen, 650 mg, Oral, Q4H PRN - x 1 3/9, 3/10  albuterol, 2 puff, Inhalation, Q6H PRN  hydrALAZINE, 10 mg, Intravenous, Q4H PRN -x 2 3/9, x 2 3/10  HYDROcodone Bit-Homatrop MBr, 5 mL, Oral, Q4H PRN - x 1 3/9, 3/10, 3/11  hydrOXYzine HCL, 25 mg, Oral, Q6H PRN  influenza vaccine, 0.5 mL, Intramuscular, Prior to discharge  ondansetron, 4 mg, Intravenous, Q4H PRN  oxyCODONE, 5 mg, Oral, Q4H PRN    Incentive spirometry  OOB  Oxygen and titrate off  IV steroids  IV antibiotics    Network Utilization Review Department  ATTENTION: Please call with any questions or concerns to [phone] and carefully listen to the prompts so that you are directed to the right person. All voicemails are confidential.   For Discharge needs, contact Care Management DC Support Team at [phone] opt. [phone]  Send all requests for admission clinical reviews, approved or denied determinations and any other requests to dedicated fax number below belonging to the campus where the patient is receiving treatment. List of dedicated fax numbers for the Facilities:  FACILITY NAME UR FAX NUMBER   ADMISSION DENIALS (Administrative/Medical Necessity) [phone]   DISCHARGE SUPPORT TEAM (NETWORK) [phone]   PARENT CHILD HEALTH (Maternity/NICU/Pediatrics) [phone]   [facility] [phone]   [facility] [phone]   [facility]  [facility] [phone]   [facility] [phone]   [facility] [phone]   [facility] [phone]   [facility] [phone]   [facility] [phone]   [facility] [phone]   [facility] [phone]   [facility] [phone]   [facility] [phone]

## 2024-03-12 ENCOUNTER — TELEPHONE (OUTPATIENT)
Age: 44
End: 2024-03-12

## 2024-03-12 VITALS
OXYGEN SATURATION: 90 % | HEART RATE: 108 BPM | TEMPERATURE: 99.7 F | RESPIRATION RATE: 16 BRPM | HEIGHT: 64 IN | SYSTOLIC BLOOD PRESSURE: 162 MMHG | DIASTOLIC BLOOD PRESSURE: 100 MMHG | WEIGHT: 178 LBS | BODY MASS INDEX: 30.39 KG/M2

## 2024-03-12 PROBLEM — I10 HYPERTENSION: Status: ACTIVE | Noted: 2024-03-09

## 2024-03-12 PROBLEM — J96.01 ACUTE HYPOXIC RESPIRATORY FAILURE (HCC): Status: RESOLVED | Noted: 2024-03-10 | Resolved: 2024-03-12

## 2024-03-12 LAB
ERYTHROCYTE [DISTWIDTH] IN BLOOD BY AUTOMATED COUNT: 14.5 % (ref 11.6–15.1)
HCT VFR BLD AUTO: 43.5 % (ref 34.8–46.1)
HGB BLD-MCNC: 14.2 G/DL (ref 11.5–15.4)
MCH RBC QN AUTO: 30 PG (ref 26.8–34.3)
MCHC RBC AUTO-ENTMCNC: 32.6 G/DL (ref 31.4–37.4)
MCV RBC AUTO: 92 FL (ref 82–98)
PLATELET # BLD AUTO: 351 THOUSANDS/UL (ref 149–390)
PMV BLD AUTO: 10.7 FL (ref 8.9–12.7)
RBC # BLD AUTO: 4.73 MILLION/UL (ref 3.81–5.12)
WBC # BLD AUTO: 10.22 THOUSAND/UL (ref 4.31–10.16)

## 2024-03-12 PROCEDURE — 85027 COMPLETE CBC AUTOMATED: CPT | Performed by: PHYSICIAN ASSISTANT

## 2024-03-12 PROCEDURE — 94640 AIRWAY INHALATION TREATMENT: CPT

## 2024-03-12 PROCEDURE — 99239 HOSP IP/OBS DSCHRG MGMT >30: CPT | Performed by: PHYSICIAN ASSISTANT

## 2024-03-12 PROCEDURE — 94760 N-INVAS EAR/PLS OXIMETRY 1: CPT

## 2024-03-12 RX ORDER — LOSARTAN POTASSIUM AND HYDROCHLOROTHIAZIDE 12.5; 1 MG/1; MG/1
1 TABLET ORAL DAILY
Qty: 30 TABLET | Refills: 0 | Status: SHIPPED | OUTPATIENT
Start: 2024-03-12

## 2024-03-12 RX ORDER — LOSARTAN POTASSIUM 25 MG/1
25 TABLET ORAL ONCE
Status: COMPLETED | OUTPATIENT
Start: 2024-03-12 | End: 2024-03-12

## 2024-03-12 RX ORDER — PREDNISONE 10 MG/1
TABLET ORAL DAILY
Qty: 30 TABLET | Refills: 0 | Status: SHIPPED | OUTPATIENT
Start: 2024-03-12 | End: 2024-03-24

## 2024-03-12 RX ORDER — ALBUTEROL SULFATE 90 UG/1
2 AEROSOL, METERED RESPIRATORY (INHALATION) EVERY 6 HOURS PRN
Qty: 18 G | Refills: 0 | Status: SHIPPED | OUTPATIENT
Start: 2024-03-12

## 2024-03-12 RX ADMIN — HYDROCHLOROTHIAZIDE 12.5 MG: 12.5 TABLET ORAL at 08:47

## 2024-03-12 RX ADMIN — LOSARTAN POTASSIUM 25 MG: 25 TABLET, FILM COATED ORAL at 08:47

## 2024-03-12 RX ADMIN — BUDESONIDE AND FORMOTEROL FUMARATE DIHYDRATE 2 PUFF: 160; 4.5 AEROSOL RESPIRATORY (INHALATION) at 08:49

## 2024-03-12 RX ADMIN — CARIPRAZINE 1.5 MG: 1.5 CAPSULE, GELATIN COATED ORAL at 08:49

## 2024-03-12 RX ADMIN — IPRATROPIUM BROMIDE 0.5 MG: 0.5 SOLUTION RESPIRATORY (INHALATION) at 07:36

## 2024-03-12 RX ADMIN — IPRATROPIUM BROMIDE 0.5 MG: 0.5 SOLUTION RESPIRATORY (INHALATION) at 02:14

## 2024-03-12 RX ADMIN — METHYLPREDNISOLONE SODIUM SUCCINATE 40 MG: 40 INJECTION, POWDER, FOR SOLUTION INTRAMUSCULAR; INTRAVENOUS at 08:47

## 2024-03-12 RX ADMIN — HYDRALAZINE HYDROCHLORIDE 10 MG: 20 INJECTION, SOLUTION INTRAMUSCULAR; INTRAVENOUS at 10:32

## 2024-03-12 RX ADMIN — NICOTINE 7 MG/24 HR DAILY TRANSDERMAL PATCH 1 PATCH: at 08:47

## 2024-03-12 RX ADMIN — LEVALBUTEROL HYDROCHLORIDE 1.25 MG: 1.25 SOLUTION RESPIRATORY (INHALATION) at 07:36

## 2024-03-12 RX ADMIN — LOSARTAN POTASSIUM 25 MG: 25 TABLET, FILM COATED ORAL at 10:31

## 2024-03-12 RX ADMIN — LEVALBUTEROL HYDROCHLORIDE 1.25 MG: 1.25 SOLUTION RESPIRATORY (INHALATION) at 02:14

## 2024-03-12 NOTE — DISCHARGE SUMMARY
[facility]  Discharge- Kendra Trotter 1980, 43 y.o. female MRN: [id number]  Unit/Bed#: [place] [address] -01 Encounter: [id number]  Primary Care Provider: Stephanie Willson MD   Date and time admitted to hospital: 3/9/2024  8:59 AM    * Asthma exacerbation  Assessment & Plan  History of asthma and depression who presents to the hospital with worsening shortness of breath and asthma flare  Increased work of breathing with hypoxia requiring supplemental oxygen.  Prior to admission on albuterol and symbicort.  Received IV solumedrol during her stay, switched to slow prednisone taper on discharge  Continue Symbicort as an outpatient  Continue albuterol prn  Pulmonology referral placed on discharge for close follow up    Patient left AMA    Hypertension  Assessment & Plan  Newly found on admission  Started on hyzaar 50-12.5 mg during stay, increased to 100-12.5 mg on discharge  HcG negative  Follow up with PCP as an outpatient for further workup and management    With continued accelerated hypertension despite IV and oral medication.  Informed patient she needed to stay in the hospital for stabilization of blood pressures.  Patient declined and ultimately left AGAINST MEDICAL ADVICE prior to my reevaluation.    Prediabetes  Assessment & Plan  Noted, follow up with PCP    Mood disorder (HCC)  Assessment & Plan  Stable continue Vraylar    Acute hypoxic respiratory failure (HCC)-resolved as of 3/12/2024  Assessment & Plan  Requiring up to 5 L NC O2  Due to asthma  resolved      Medical Problems       Resolved Problems  Date Reviewed: 3/12/2024            Resolved    Acute hypoxic respiratory failure (HCC) 3/12/2024     Resolved by  Tre Castorena PA-C        Discharging Physician / Practitioner: Tre Castorena PA-C  PCP: Stephanie Willson MD  Admission Date:   Admission Orders (From admission, onward)       Ordered        03/09/24 1241  INPATIENT ADMISSION  Once                       "    Discharge Date: 03/12/24    Consultations During Hospital Stay:  None    Procedures Performed:   None    Significant Findings / Test Results:   XR chest 2 views  Result Date: 3/9/2024    Impression: No acute cardiopulmonary disease. Workstation performed: [id number]      Incidental Findings:   None    Test Results Pending at Discharge (will require follow up):   None     Outpatient Tests Requested:  None    Complications:  None    Reason for Admission:     Hospital Course:   Kendra Trotter is a 43 y.o. female patient with a past medical history of asthma and depression who originally presented to the hospital on 3/9/2024 due to shortness of breath. The patient reported on the day of admission increased work of breathing.  She was ultimately diagnosed with an asthma exacerbation and subsequently treated with IV steroids as well as scheduled nebulizer treatments with improvement.  She was transition to oral steroids on the day of discharge however the patient's blood pressure remained persistently elevated through her hospitalization.  She ultimately left AGAINST MEDICAL ADVICE prior to stabilization of her blood pressures.  She left prior to my reevaluation.    Please see above list of diagnoses and related plan for additional information.     Condition at Discharge: stable    Discharge Day Visit / Exam:   Subjective: The patient is seen resting in bed.  I was hopeful for discharge today however the patient's blood pressure has remained persistently elevated.  In order for discharge, I explained that we need her blood pressure to be decreased.  Unfortunately she was unwilling to wait and ultimately left AGAINST MEDICAL ADVICE.  The patient left prior to my reevaluation  Vitals: Blood Pressure: 162/100 (03/12/24 1121)  Pulse: (!) 108 (03/12/24 1118)  Temperature: 99.7 °F (37.6 °C) (03/12/24 0731)  Temp Source: Oral (03/12/24 0731)  Respirations: 16 (03/12/24 0731)  Height: 5' 4\" (162.6 cm) (03/09/24 1545)  Weight " - Scale: 80.7 kg (178 lb) (03/09/24 1545)  SpO2: 90 % (03/12/24 1118)  Exam:   Physical Exam  Vitals and nursing note reviewed.   Constitutional:       General: She is not in acute distress.     Appearance: Normal appearance. She is not ill-appearing, toxic-appearing or diaphoretic.   HENT:      Head: Normocephalic and atraumatic.   Cardiovascular:      Rate and Rhythm: Normal rate and regular rhythm.      Heart sounds: No murmur heard.     No friction rub. No gallop.   Pulmonary:      Effort: Pulmonary effort is normal. No respiratory distress.      Breath sounds: No wheezing, rhonchi or rales.      Comments: Mild BL wheezing  Abdominal:      General: Abdomen is flat. Bowel sounds are normal. There is no distension.      Palpations: Abdomen is soft.      Tenderness: There is no abdominal tenderness.   Musculoskeletal:      Right lower leg: No edema.      Left lower leg: No edema.   Skin:     General: Skin is warm and dry.      Coloration: Skin is not jaundiced or pale.   Neurological:      General: No focal deficit present.      Mental Status: She is alert. Mental status is at baseline.          Discussion with Family: Patient declined call to .     Discharge instructions/Information to patient and family:   See after visit summary for information provided to patient and family.      Provisions for Follow-Up Care:  See after visit summary for information related to follow-up care and any pertinent home health orders.      Mobility at time of Discharge:   Basic Mobility Inpatient Raw Score: 24  JH-HLM Goal: 8: Walk 250 feet or more  JH-HLM Achieved: 7: Walk 25 feet or more  HLM Goal achieved. Continue to encourage appropriate mobility.     Disposition:   Home    Planned Readmission: n/a     Discharge Statement:  I spent 45 minutes discharging the patient. This time was spent on the day of discharge. I had direct contact with the patient on the day of discharge. Greater than 50% of the total time was  spent examining patient, answering all patient questions, arranging and discussing plan of care with patient as well as directly providing post-discharge instructions.  Additional time then spent on discharge activities.    Discharge Medications:  See after visit summary for reconciled discharge medications provided to patient and/or family.      **Please Note: This note may have been constructed using a voice recognition system**

## 2024-03-12 NOTE — PLAN OF CARE
Problem: PAIN - ADULT  Goal: Verbalizes/displays adequate comfort level or baseline comfort level  Description: Interventions:  - Encourage patient to monitor pain and request assistance  - Assess pain using appropriate pain scale  - Administer analgesics based on type and severity of pain and evaluate response  - Implement non-pharmacological measures as appropriate and evaluate response  - Consider cultural and social influences on pain and pain management  - Notify physician/advanced practitioner if interventions unsuccessful or patient reports new pain  Outcome: Progressing     Problem: RESPIRATORY - ADULT  Goal: Achieves optimal ventilation and oxygenation  Description: INTERVENTIONS:  - Assess for changes in respiratory status  - Assess for changes in mentation and behavior  - Position to facilitate oxygenation and minimize respiratory effort  - Oxygen administered by appropriate delivery if ordered  - Initiate smoking cessation education as indicated  - Encourage broncho-pulmonary hygiene including cough, deep breathe, Incentive Spirometry  - Assess the need for suctioning and aspirate as needed  - Assess and instruct to report SOB or any respiratory difficulty  - Respiratory Therapy support as indicated  Outcome: Progressing

## 2024-03-12 NOTE — PLAN OF CARE
Problem: PAIN - ADULT  Goal: Verbalizes/displays adequate comfort level or baseline comfort level  Description: Interventions:  - Encourage patient to monitor pain and request assistance  - Assess pain using appropriate pain scale  - Administer analgesics based on type and severity of pain and evaluate response  - Implement non-pharmacological measures as appropriate and evaluate response  - Consider cultural and social influences on pain and pain management  - Notify physician/advanced practitioner if interventions unsuccessful or patient reports new pain  Outcome: Progressing     Problem: INFECTION - ADULT  Goal: Absence or prevention of progression during hospitalization  Description: INTERVENTIONS:  - Assess and monitor for signs and symptoms of infection  - Monitor lab/diagnostic results  - Monitor all insertion sites, i.e. indwelling lines, tubes, and drains  - Monitor endotracheal if appropriate and nasal secretions for changes in amount and color  - [place] appropriate cooling/warming therapies per order  - Administer medications as ordered  - Instruct and encourage patient and family to use good hand hygiene technique  - Identify and instruct in appropriate isolation precautions for identified infection/condition  Outcome: Progressing  Goal: Absence of fever/infection during neutropenic period  Description: INTERVENTIONS:  - Monitor WBC    Outcome: Progressing     Problem: SAFETY ADULT  Goal: Patient will remain free of falls  Description: INTERVENTIONS:  - Educate patient/family on patient safety including physical limitations  - Instruct patient to call for assistance with activity   - Consult OT/PT to assist with strengthening/mobility   - Keep Call bell within reach  - Keep bed low and locked with side rails adjusted as appropriate  - Keep care items and personal belongings within reach  - Initiate and maintain comfort rounds  - Make Fall Risk Sign visible to staff  - Offer Toileting every  Hours,  in advance of need  - Initiate/Maintain alarm  - Obtain necessary fall risk management equipment:   - Apply yellow socks and bracelet for high fall risk patients  - Consider moving patient to room near nurses station  Outcome: Progressing  Goal: Maintain or return to baseline ADL function  Description: INTERVENTIONS:  -  Assess patient's ability to carry out ADLs; assess patient's baseline for ADL function and identify physical deficits which impact ability to perform ADLs (bathing, care of mouth/teeth, toileting, grooming, dressing, etc.)  - Assess/evaluate cause of self-care deficits   - Assess range of motion  - Assess patient's mobility; develop plan if impaired  - Assess patient's need for assistive devices and provide as appropriate  - Encourage maximum independence but intervene and supervise when necessary  - Involve family in performance of ADLs  - Assess for home care needs following discharge   - Consider OT consult to assist with ADL evaluation and planning for discharge  - Provide patient education as appropriate  Outcome: Progressing  Goal: Maintains/Returns to pre admission functional level  Description: INTERVENTIONS:  - Perform AM-PAC 6 Click Basic Mobility/ Daily Activity assessment daily.  - Set and communicate daily mobility goal to care team and patient/family/caregiver.   - Collaborate with rehabilitation services on mobility goals if consulted  - Perform Range of Motion  times a day.  - Reposition patient every  hours.  - Dangle patient  times a day  - Stand patient  times a day  - Ambulate patient  times a day  - Out of bed to chair  times a day   - Out of bed for meals  times a day  - Out of bed for toileting  - Record patient progress and toleration of activity level   Outcome: Progressing     Problem: DISCHARGE PLANNING  Goal: Discharge to home or other facility with appropriate resources  Description: INTERVENTIONS:  - Identify barriers to discharge w/patient and caregiver  - Arrange for  needed discharge resources and transportation as appropriate  - Identify discharge learning needs (meds, wound care, etc.)  - Arrange for interpretive services to assist at discharge as needed  - Refer to Case Management Department for coordinating discharge planning if the patient needs post-hospital services based on physician/advanced practitioner order or complex needs related to functional status, cognitive ability, or social support system  Outcome: Progressing     Problem: CARDIOVASCULAR - ADULT  Goal: Maintains optimal cardiac output and hemodynamic stability  Description: INTERVENTIONS:  - Monitor I/O, vital signs and rhythm  - Monitor for S/S and trends of decreased cardiac output  - Administer and titrate ordered vasoactive medications to optimize hemodynamic stability  - Assess quality of pulses, skin color and temperature  - Assess for signs of decreased coronary artery perfusion  - Instruct patient to report change in severity of symptoms  Outcome: Progressing  Goal: Absence of cardiac dysrhythmias or at baseline rhythm  Description: INTERVENTIONS:  - Continuous cardiac monitoring, vital signs, obtain 12 lead EKG if ordered  - Administer antiarrhythmic and heart rate control medications as ordered  - Monitor electrolytes and administer replacement therapy as ordered  Outcome: Progressing     Problem: RESPIRATORY - ADULT  Goal: Achieves optimal ventilation and oxygenation  Description: INTERVENTIONS:  - Assess for changes in respiratory status  - Assess for changes in mentation and behavior  - Position to facilitate oxygenation and minimize respiratory effort  - Oxygen administered by appropriate delivery if ordered  - Initiate smoking cessation education as indicated  - Encourage broncho-pulmonary hygiene including cough, deep breathe, Incentive Spirometry  - Assess the need for suctioning and aspirate as needed  - Assess and instruct to report SOB or any respiratory difficulty  - Respiratory Therapy  support as indicated  Outcome: Progressing     Problem: COPING  Goal: Pt/Family able to verbalize concerns and demonstrate effective coping strategies  Description: INTERVENTIONS:  - Assist patient/family to identify coping skills, available support systems and cultural and spiritual values  - Provide emotional support, including active listening and acknowledgement of concerns of patient and caregivers  - Reduce environmental stimuli, as able  - Provide patient education  - Assess for spiritual pain/suffering and initiate spiritual care, including notification of Pastoral Care or sharmila based community as needed  - Assess effectiveness of coping strategies  Outcome: Progressing

## 2024-03-12 NOTE — ASSESSMENT & PLAN NOTE
Newly found on admission  Started on hyzaar 50-12.5 mg during stay, increased to 100-12.5 mg on discharge  HcG negative  Follow up with PCP as an outpatient for further workup and management    With continued accelerated hypertension despite IV and oral medication.  Informed patient she needed to stay in the hospital for stabilization of blood pressures.  Patient declined and ultimately left AGAINST MEDICAL ADVICE prior to my reevaluation.

## 2024-03-12 NOTE — NURSING NOTE
Pt bp is currently high, meds were given but BP still remains high. Pt was advise not to leave until BP is under control. PT stated that she has a lot to do and needs to leave now. MD made aware and pt left AMA at this time.

## 2024-03-12 NOTE — TELEPHONE ENCOUNTER
Scheduled patient for pulm appt per referral for 04/2 in [place]. Offered her sooner but wants to be seen in [place].

## 2024-03-12 NOTE — ASSESSMENT & PLAN NOTE
History of asthma and depression who presents to the hospital with worsening shortness of breath and asthma flare  Increased work of breathing with hypoxia requiring supplemental oxygen.  Prior to admission on albuterol and symbicort.  Received IV solumedrol during her stay, switched to slow prednisone taper on discharge  Continue Symbicort as an outpatient  Continue albuterol prn  Pulmonology referral placed on discharge for close follow up    Patient left AMA

## 2024-03-12 NOTE — UTILIZATION REVIEW
NOTIFICATION OF ADMISSION DISCHARGE   This is a Notification of Discharge from [facility]. Please be advised that this patient has been discharge from our facility. Below you will find the admission and discharge date and time including the patient’s disposition.   UTILIZATION REVIEW CONTACT:  Hannah Lebron  Utilization   Network Utilization Review Department  Phone: [phone] x carefully listen to the prompts. All voicemails are confidential.  Email: [email or web address]     ADMISSION INFORMATION  PRESENTATION DATE: 3/9/2024  8:59 AM  OBERVATION ADMISSION DATE:   INPATIENT ADMISSION DATE: 3/9/24 12:41 PM   DISCHARGE DATE: 3/12/2024 12:00 PM   DISPOSITION:Left against medical advice or discontinued care    Network Utilization Review Department  ATTENTION: Please call with any questions or concerns to [phone] and carefully listen to the prompts so that you are directed to the right person. All voicemails are confidential.   For Discharge needs, contact Care Management DC Support Team at [phone] opt. 2  Send all requests for admission clinical reviews, approved or denied determinations and any other requests to dedicated fax number below belonging to the campus where the patient is receiving treatment. List of dedicated fax numbers for the Facilities:  FACILITY NAME UR FAX NUMBER   ADMISSION DENIALS (Administrative/Medical Necessity) [phone]   DISCHARGE SUPPORT TEAM ([facility]) [phone]   PARENT CHILD HEALTH (Maternity/NICU/Pediatrics) [phone]   General acute hospital [phone]   [facility] [phone]   [facility] [phone]   [facility] [phone]   [facility] [phone]   [facility] [phone]   [facility] [phone]   [facility]  [facility] [phone]   [facility] [phone]   [facility] [phone]   [facility] [phone]   [facility] [phone]

## 2024-03-13 ENCOUNTER — TELEPHONE (OUTPATIENT)
Dept: FAMILY MEDICINE CLINIC | Facility: CLINIC | Age: 44
End: 2024-03-13

## 2024-03-14 ENCOUNTER — TELEPHONE (OUTPATIENT)
Dept: FAMILY MEDICINE CLINIC | Facility: CLINIC | Age: 44
End: 2024-03-14

## 2025-07-07 ENCOUNTER — APPOINTMENT (OUTPATIENT)
Dept: LAB | Facility: CLINIC | Age: 45
End: 2025-07-07
Payer: MEDICARE

## 2025-07-07 DIAGNOSIS — J45.40 MODERATE PERSISTENT ASTHMA WITHOUT COMPLICATION: ICD-10-CM

## 2025-07-07 DIAGNOSIS — Z13.220 ENCOUNTER FOR SCREENING FOR LIPOID DISORDERS: ICD-10-CM

## 2025-07-07 LAB
ALBUMIN SERPL BCG-MCNC: 4 G/DL (ref 3.5–5)
ALP SERPL-CCNC: 82 U/L (ref 34–104)
ALT SERPL W P-5'-P-CCNC: 9 U/L (ref 7–52)
ANION GAP SERPL CALCULATED.3IONS-SCNC: 8 MMOL/L (ref 4–13)
AST SERPL W P-5'-P-CCNC: 14 U/L (ref 13–39)
BASOPHILS # BLD AUTO: 0.05 THOUSANDS/ÂΜL (ref 0–0.1)
BASOPHILS NFR BLD AUTO: 1 % (ref 0–1)
BILIRUB SERPL-MCNC: 0.3 MG/DL (ref 0.2–1)
BUN SERPL-MCNC: 10 MG/DL (ref 5–25)
CALCIUM SERPL-MCNC: 8.8 MG/DL (ref 8.4–10.2)
CHLORIDE SERPL-SCNC: 103 MMOL/L (ref 96–108)
CHOLEST SERPL-MCNC: 174 MG/DL (ref ?–200)
CO2 SERPL-SCNC: 28 MMOL/L (ref 21–32)
CREAT SERPL-MCNC: 0.64 MG/DL (ref 0.6–1.3)
EOSINOPHIL # BLD AUTO: 0.23 THOUSAND/ÂΜL (ref 0–0.61)
EOSINOPHIL NFR BLD AUTO: 3 % (ref 0–6)
ERYTHROCYTE [DISTWIDTH] IN BLOOD BY AUTOMATED COUNT: 14.1 % (ref 11.6–15.1)
GFR SERPL CREATININE-BSD FRML MDRD: 110 ML/MIN/1.73SQ M
GLUCOSE P FAST SERPL-MCNC: 108 MG/DL (ref 65–99)
HCT VFR BLD AUTO: 42.5 % (ref 34.8–46.1)
HDLC SERPL-MCNC: 54 MG/DL
HGB BLD-MCNC: 13.5 G/DL (ref 11.5–15.4)
IMM GRANULOCYTES # BLD AUTO: 0.02 THOUSAND/UL (ref 0–0.2)
IMM GRANULOCYTES NFR BLD AUTO: 0 % (ref 0–2)
LDLC SERPL CALC-MCNC: 109 MG/DL (ref 0–100)
LYMPHOCYTES # BLD AUTO: 2.25 THOUSANDS/ÂΜL (ref 0.6–4.47)
LYMPHOCYTES NFR BLD AUTO: 31 % (ref 14–44)
MCH RBC QN AUTO: 30.3 PG (ref 26.8–34.3)
MCHC RBC AUTO-ENTMCNC: 31.8 G/DL (ref 31.4–37.4)
MCV RBC AUTO: 96 FL (ref 82–98)
MONOCYTES # BLD AUTO: 0.35 THOUSAND/ÂΜL (ref 0.17–1.22)
MONOCYTES NFR BLD AUTO: 5 % (ref 4–12)
NEUTROPHILS # BLD AUTO: 4.28 THOUSANDS/ÂΜL (ref 1.85–7.62)
NEUTS SEG NFR BLD AUTO: 60 % (ref 43–75)
NONHDLC SERPL-MCNC: 120 MG/DL
NRBC BLD AUTO-RTO: 0 /100 WBCS
PLATELET # BLD AUTO: 321 THOUSANDS/UL (ref 149–390)
PMV BLD AUTO: 11 FL (ref 8.9–12.7)
POTASSIUM SERPL-SCNC: 4 MMOL/L (ref 3.5–5.3)
PROT SERPL-MCNC: 7.1 G/DL (ref 6.4–8.4)
RBC # BLD AUTO: 4.45 MILLION/UL (ref 3.81–5.12)
SODIUM SERPL-SCNC: 139 MMOL/L (ref 135–147)
TRIGL SERPL-MCNC: 57 MG/DL (ref ?–150)
WBC # BLD AUTO: 7.18 THOUSAND/UL (ref 4.31–10.16)

## 2025-07-07 PROCEDURE — 80053 COMPREHEN METABOLIC PANEL: CPT

## 2025-07-07 PROCEDURE — 85025 COMPLETE CBC W/AUTO DIFF WBC: CPT

## 2025-07-07 PROCEDURE — 80061 LIPID PANEL: CPT

## 2025-07-07 PROCEDURE — 36415 COLL VENOUS BLD VENIPUNCTURE: CPT
